# Patient Record
Sex: FEMALE | Race: BLACK OR AFRICAN AMERICAN
[De-identification: names, ages, dates, MRNs, and addresses within clinical notes are randomized per-mention and may not be internally consistent; named-entity substitution may affect disease eponyms.]

---

## 2017-07-10 NOTE — EMERGENCY ROOM REPORT
History of Present Illness


General


Chief Complaint:  Pain


Source:  Patient, Medical Record





Present Illness


HPI


This patient states that she's been in multiple hospitals over the past few 

weeks.  She was admitted to [facility].  She was admitted to 

[facility].  Apparently she has had a labile blood pressure.  She had 

a syncopal episode in a restaurant.  She was found to have a mild cold a this.  

She states that she gets abdominal pain when she eats.  She also states that 

she has chronic right knee pain that is uncontrolled and she needs to get 

physical therapy and rehabilitation for this.  She was sent in by her primary 

care physician Dr. Vogt who plans on admitting her for further evaluation and 

possible placement in a rehabilitation facility.  She denies fever or chills.  

She denies chest pain or shortness of breath.  She has no other complaints.


Allergies:  


Coded Allergies:  


     LAMOTRIGINE (Verified  Allergy, Mild, Hives, 2/2/13)


     PENICILLINS (Verified  Allergy, Mild, Hives, 2/2/13)


     ERYTHROMYCIN BASE (Unverified  Allergy, Unknown, Rash, 2/25/15)


     PREGABALIN (Verified  Allergy, Unknown, 2/25/15)


 SWELLING


     TRAMADOL HCL (Verified  Allergy, Unknown, 2/25/15)


 VOMIT





Patient History


Past Medical History:  see triage record, HTN, CAD, GERD, CVA/TIA


Past Surgical History:  other - Gastric bypass


Social History:  Denies: alcohol use, drug use, smoking


Reviewed Nursing Documentation:  PMH: Agreed, PSxH: Agreed





Nursing Documentation-PMH


Past Medical History:  No History, Except For


Hx Cardiac Problems:  Yes - Acute coronary syndrome


Hx Hypertension:  Yes


Hx Cancer:  No


Hx Gastrointestinal Problems:  Yes - Abdominal pain


Hx Neurological Problems:  Yes - fibromyalgia


Hx Transient Ischemic Attacks:  Yes





Review of Systems


All Other Systems:  negative except mentioned in HPI





Physical Exam





Vital Signs








  Date Time  Temp Pulse Resp B/P Pulse Ox O2 Delivery O2 Flow Rate FiO2


 


7/10/17 18:48 97.9 82 16 117/72 96 Room Air  








Sp02 EP Interpretation:  reviewed, normal


General Appearance:  no apparent distress, alert, GCS 15, non-toxic, obese - 

morbid


Head:  normocephalic, atraumatic


Eyes:  bilateral eye PERRL, bilateral eye normal inspection


ENT:  hearing grossly normal, normal pharynx, no angioedema, normal voice


Neck:  full range of motion, supple/symm/no masses


Respiratory:  chest non-tender, lungs clear, normal breath sounds, speaking 

full sentences


Cardiovascular #1:  regular rate, rhythm, no edema


Gastrointestinal:  normal bowel sounds, non tender, soft, non-distended, no 

guarding, no rebound


Rectal:  deferred


Musculoskeletal:  back normal, normal range of motion, other - Pain w/ ROM of 

R. knee.


Neurologic:  alert, oriented x3, responsive, motor strength/tone normal, 

sensory intact, speech normal


Psychiatric:  judgement/insight normal, memory normal, mood/affect normal, no 

suicidal/homicidal ideation


Skin:  normal color, no rash, warm/dry, well hydrated





Medical Decision Making


Diagnostic Impression:  


 Primary Impression:  


 Failure to thrive in adult


 Additional Impressions:  


 Uncontrolled pain


 Inability to ambulate due to knee


ER Course


This patient presents with failure to thrive.  She has been in and out of 

multiple hospitals over the past few weeks.  She is unable to manage her 

medical conditions at home.  She is admitted for further management and 

possible placement in a rehabilitation facility.





Labs








Test


  7/10/17


20:27


 


White Blood Count


  5.2 K/UL


(4.8-10.8)


 


Red Blood Count


  3.16 M/UL


(4.20-5.40)


 


Hemoglobin


  10.2 G/DL


(12.0-16.0)


 


Hematocrit


  31.2 %


(37.0-47.0)


 


Mean Corpuscular Volume 99 FL (80-99) 


 


Mean Corpuscular Hemoglobin


  32.1 PG


(27.0-31.0)


 


Mean Corpuscular Hemoglobin


Concent 32.6 G/DL


(32.0-36.0)


 


Red Cell Distribution Width


  13.8 %


(11.6-14.8)


 


Platelet Count


  278 K/UL


(150-450)


 


Mean Platelet Volume


  6.3 FL


(6.5-10.1)


 


Neutrophils (%) (Auto)


  49.5 %


(45.0-75.0)


 


Lymphocytes (%) (Auto)


  38.8 %


(20.0-45.0)


 


Monocytes (%) (Auto)


  7.6 %


(1.0-10.0)


 


Eosinophils (%) (Auto)


  2.8 %


(0.0-3.0)


 


Basophils (%) (Auto)


  1.3 %


(0.0-2.0)


 


Sodium Level


  137 mEQ/L


(135-145)


 


Potassium Level


  4.7 mEQ/L


(3.4-4.9)


 


Chloride Level


  102 mEQ/L


()


 


Carbon Dioxide Level


  26 mEQ/L


(20-30)


 


Anion Gap 9 (5-15) 


 


Blood Urea Nitrogen


  23 mg/dL


(7-23)


 


Creatinine


  1.3 mg/dL


(0.5-0.9)


 


Estimat Glomerular Filtration


Rate 42.7 mL/min


(>60)


 


Glucose Level


  79 mg/dL


()


 


Calcium Level


  8.5 mg/dL


(8.6-10.2)


 


Total Bilirubin


  0.2 mg/dL


(0.0-1.2)


 


Aspartate Amino Transf


(AST/SGOT) 12 U/L (5-40) 


 


 


Alanine Aminotransferase


(ALT/SGPT) 10 U/L (3-33) 


 


 


Alkaline Phosphatase


  88 U/L


()


 


Total Protein


  6.8 g/dL


(6.6-8.7)


 


Albumin


  3.7 g/dL


(3.5-5.2)


 


Globulin 3.1 g/dL 


 


Albumin/Globulin Ratio 1.1 (1.0-2.7) 











Last Vital Signs








  Date Time  Temp Pulse Resp B/P Pulse Ox O2 Delivery O2 Flow Rate FiO2


 


7/10/17 18:48 97.9 82 16 117/72 96 Room Air  








Disposition:  ADMITTED AS INPATIENT


Condition:  Stable


Referrals:  


Medhat Vogt MD (PCP)











SARAH JOHNSON D.O. Jul 10, 2017 19:33

## 2017-07-11 NOTE — HISTORY AND PHYSICAL REPORT
DATE OF ADMISSION:  07/11/2017



CHIEF COMPLAINT:  The patient is a 56-year-old, obese 

American female, presents with complaint of right knee pain.



HISTORY OF PRESENT ILLNESS:  The patient has a history of right knee

pain.  The patient is followed by pain management, Dr. Cruz, at [facility].



The patient states she has had intractable knee pain.  Knee pain is

constant.  The patient states knee pain is worse while lying in bed.  The

patient is also unable to walk.



The patient presented to [facility] emergency room after being evaluated at

several other hospitals.  The patient was admitted for intractable knee

pain for placement in physical therapy or acute rehabilitation.



PAST MEDICAL HISTORY:  Significant for,



1. Hypertension.

2. Bipolar depression.

3. Obesity.

4. Fibromyalgia.



PAST SURGICAL HISTORY:  Significant for,



1. Consuelo-en-Y gastric bypass surgery.

2. Total abdominal hysterectomy.

3. Right foot surgery on the fifth toe.



CURRENT MEDICATIONS:

1. Albuterol 2.5 mg nebulizer q.4 h. p.r.n.

2. BuSpar 30 mg one tablet p.o. twice daily.

3. Soma 350 mg one tablet p.o. three times daily.

4. Cymbalta 60 mg one tablet p.o. twice daily.

5. Nexium 40 mg one tablet p.o. daily.

6. Neurontin 1200 mg one tablet p.o. three times daily.

7. Norco 10/325 mg one tablet p.o. q.4 hours p.r.n.

8. Ativan 1 mg one tablet p.o. three times daily.

9. Propranolol 10 mg one tablet p.o. q.6 hours.

10. Seroquel 300 mg one tablet p.o. at bedtime.

11. Seroquel 100 mg one tablet p.o. daily.

12. Trazodone 100 mg one tablet p.o. at bedtime.

13. Trazodone 300 mg one tablet p.o. at bedtime.

14. Diovan 160 mg one tablet p.o. twice daily.

15. Ambien 10 mg one tablet p.o. at bedtime.



ALLERGIES:

1. Lamictal.

2. Penicillin.

3. Erythromycin.

4. Lyrica.

5. Tramadol.

6. Zofran.

7. Neurontin.



SOCIAL HISTORY:  The patient is single and is disabled.  The patient

lives in an apparent board and [facility].  The patient denies tobacco or

alcohol use.



REVIEW OF SYSTEMS:  Constitutional:  The patient denies weight loss

of weight gain.  The patient denies fevers or chills.  HEENT:  The patient

denies ear or throat pain.  Cardiovascular:  The patient denies

palpitations or chest pain.  Chest:  The patient denies wheeze or

shortness of breath.  Abdomen:  The patient denies nausea, vomiting,

diarrhea, or constipation.  Genitourinary:  The patient denies dysuria or

increased frequency of urination.  Neuromuscular:  The patient complains

of knee pain as above.  The patient denies seizures or generalized

weakness.



PHYSICAL EXAMINATION:

VITAL SIGNS:  Temperature 97.5 degrees, respirations 20, pulse 71,

and blood pressure 114/61.

GENERAL:  The patient is well-developed and well-nourished obese

 female, in no apparent distress.

HEENT:  Eyes, pupils are equal and responsive to light and

accommodation.  Extraocular movements are intact.

NECK:  Supple without lymphadenopathy.

CHEST:  Lungs are clear to auscultation bilaterally without wheezes

or rales.

CARDIOVASCULAR:  Regular rhythm and rate.  S1 and S2 are normal

without murmurs, rubs, or gallops.

ABDOMEN:  Soft, obese, nontender, and nondistended.  Positive bowel

sounds.  No evidence of hepatosplenomegaly.  Currently, no rebound or

guarding noted.

EXTREMITIES:  Negative for clubbing, cyanosis, or edema.

RECTAL AND GENITALIA:  Refused.

NEUROLOGIC:  Cranial nerves II through XII are grossly intact without

focal deficits.  Motor strength is 5/5 bilaterally.  Deep tendon reflexes

2+ plantar.



LABORATORY STUDIES:  WBC 5.2, hemoglobin 10.2, hematocrit 31.2, and

platelets 278,000.  Sodium 137, potassium 4.7, chloride 102, CO2 26, BUN

23, and creatinine 1.3.  Glucose 79.  A CT scan of the right knee is

pending.



ASSESSMENT:  This is a 56 year  female.



1. Right knee pain.

2. Osteoarthritis, right knee.

3. Hypertension.

4. Bipolar depression.

5. Obesity.

6. Fibromyalgia.



TREATMENT:

1. Right knee pain.  A CT scan of the right knee is pending.  The

patient is not able to accommodate an MRI due to physical size.  A pain

management consultation is pending.

2. Osteoarthritis, right knee.  The patient has been placed empirically

on Norco 10/325 p.r.n.

3. Hypertension.  Continue valsartan as above.

4. Bipolar depression.  Continue Seroquel and trazodone as above.

Continue Cymbalta as above.

5. Obesity.

6. Fibromyalgia.









  ______________________________________________

  Ludwin Fulton M.D.





DR:  SHELIA

D:  07/11/2017 18:41

T:  07/11/2017 20:17

JOB#:  [id number]

CC:

## 2017-07-11 NOTE — HISTORY & PHYSICAL
History and Physical


History & Physicial


Dictated for Int Med-Dr Cobb no. [id number]











EDER ROBB Jul 11, 2017 18:42

## 2017-07-11 NOTE — CONSULTATION
DATE OF CONSULTATION:  07/11/2017



PAIN MANAGEMENT CONSULTATION



CONSULTING PHYSICIAN:  Behnoush Zarrini, M.D.



PHYSICIAN ASSISTANT:  SHANTEL Briceno



REFERRING PHYSICIAN:  Medhat Vogt M.D.



CHIEF COMPLAINT:  Right knee pain and low back pain.



HISTORY OF PRESENT ILLNESS:  This is a 56-year-old female, who is

being seen on the Medical/Surgical floor of [facility] for

initial comprehensive pain management consultation.  The patient is

reporting that she has been having right knee pain for many years.  It is

a constant, chronic pain, rating at 10/10, describing as a sharp, stabbing

pain, increased with movement and nothing has been helping to relieve her

pain.  Also having low back pain for many years.  It is a constant,

chronic pain, rating at 10/10, radiating down to her right lower

extremity, increasing with movement, and nothing has been helping to

relieve the pain.  The patient reports that she had right knee arthroscopy

with Dr. Fletcher about a year ago but she continued to fall on her knees

increasing her pain.  Also, recently had SI  radiofrequency ablation with

Dr. Edwards at [facility], for her lower back taking Norco 10/325 one

tablet three times a day with minimal pain relief.  Due to this and severe

pain, the patient was admitted to the hospital under the care of Dr. Vogt.



At this time, the patient is on Norco 10/325 mg every eight hours as

needed for moderate pain, morphine 2 mg IV every four hours as needed for

moderate pain, and morphine 4 mg IV every four hours as needed for severe

pain.  We were consulted so that the patient would have adequate pain

control while here in the hospital.



PAST MEDICAL HISTORY:  Hypertension, fibromyalgia, degenerative joint

disease, depression, and anxiety.



PAST SURGICAL HISTORY:  Arthroscopy and hysterectomy.



SOCIAL HISTORY:  Denies smoking tobacco, drinking alcohol, or IV drug

abuse.





REVIEW OF SYSTEMS:  Denies rash, fever, chills, sweating, dizziness,

drowsiness, blurred vision, sore throat, and change in weight.  No

shortness of breath or chest pain.  No nausea, vomiting, diarrhea, or

blood in the stool or urine.  No bowel or bladder incontinence.  No

dysuria.  She is complaining of right knee pain and low back pain.



PHYSICAL EXAMINATION:

GENERAL:  Alert, awake, and oriented x3.

VITAL SIGNS:  Blood pressure 114/61, heart rate is 71, oxygen

saturation 98%, respiratory rate 19, and temperature is 97.2 degrees

Fahrenheit.

HEENT:  PERRLA.

NECK:  Range of motion is full in all directions.  No tenderness to

paracervical muscles.  No adenopathy

LUNGS:  Decreased breath sounds bilaterally

HEART:  S1 and S2 regular.

ABDOMEN:  Obese.

BACK:  Range of motion is decreased in flexion and extension with

tenderness to paraspinal muscles.  No tenderness to trapezius or rhomboid

muscles.

EXTREMITIES:  Upper extremity range of motion is full in all

directions.  Motor is intact.  No cyanosis.  No clubbing.  No edema.

Sensory is intact.  Reflexes are unobtainable.  No adenopathy.  Lower

extremity range of motion is decreased due to the patient's clinical

condition.  Motor is 4/5 in all muscles bilaterally.  No cyanosis.  No

clubbing.  Sensory is reduced.  Reflexes are unobtainable. No

adenopathy.



ASSESSMENT AND PLAN:  This is a 56-year-old female with morbid

obesity, right knee pain and right knee osteoarthritis, lumbar

degenerative disease, lumbar spondylosis, and lumbar radiculopathy.  We

will order an MRI of the right knee without contrast and Lidoderm patch to

be applied to the right knee, 12 hours on and 12 hours off.  Continue on

the morphine and Norco as needed.  The patient was discussed with Dr. Klein and Dr. Klein concurred.  We will follow the patient.



Thank you very much for the courtesy of this consultation.







  ______________________________________________

  Behnoush Zarrini, M.D.



  ______________________________________________

  BRITTANY Briceno





DR:  YVAN

D:  07/11/2017 17:30

T:  07/12/2017 08:57

JOB#:  [id number]

CC:



QAMAR

## 2017-07-11 NOTE — CONSULTATION
History of Present Illness


General


Date patient seen:  Jul 11, 2017


Chief Complaint:  Pain


Referring physician:  dr Fulton


Reason for Consultation:  inpatient management





Present Illness


Allergies:  


Coded Allergies:  


     LAMOTRIGINE (Verified  Allergy, Mild, Hives, 2/2/13)


     PENICILLINS (Verified  Allergy, Mild, Hives, 2/2/13)


     ERYTHROMYCIN BASE (Unverified  Allergy, Unknown, Rash, 2/25/15)


     PREGABALIN (Verified  Allergy, Unknown, 2/25/15)


 SWELLING


     TRAMADOL HCL (Verified  Allergy, Unknown, 2/25/15)


 VOMIT





Medication History


Scheduled


Buspirone Hcl* (Buspar*), 30 MG ORAL BID, (Reported)


Duloxetine Hcl* (Cymbalta*), 60 MG PO BID, (Reported)


Gabapentin* (Neurontin*), 1,200 MG ORAL TID, (Reported)


Lorazepam* (Ativan*), 1 MG ORAL THREE TIMES A DAY, (Reported)


Quetiapine Fumarate (Seroquel), 300 MG ORAL HS, (Reported)


Quetiapine Fumarate* (Seroquel*), 100 MG PO DAILY, (Reported)


Trazodone* (Trazodone*), 100 MG ORAL DAILY, (Reported)


Trazodone* (Trazodone*), 300 MG ORAL BEDTIME, (Reported)


Valsartan (Diovan), 160 MG ORAL BID, (Reported)





Scheduled PRN


Albuterol Sulfate* (Albuterol Sulfate Hhn*), 3 ML INH Q4H PRN for Shortness of 

Breath, (Reported)


Carisoprodol* (Soma*), 350 MG PO TID PRN for For Pain, (Reported)


Esomeprazole Magnesium (Nexium), 40 MG ORAL DAILY PRN for Per rx protocol, (

Reported)


Hydrocodone/Acetaminophen (Hydrocodon-Acetaminophn ), 1 TAB ORAL Q4H PRN 

for For Pain, (Reported)


Propranolol Hcl* (Inderal*), 10 MG ORAL EVERY 6 HOURS PRN for palpitation, (

Reported)


Zolpidem Tartrate* (Ambien*), 10 MG ORAL HS PRN for Insomnia, (Reported)





Patient History


Healthcare decision maker





Resuscitation status





Advanced Directive on File


No





Review of Systems


All Other Systems:  negative except mentioned in HPI





Physical Exam


General Appearance:  WD/WN


Lines, tubes and drains:  peripheral, central line


HEENT:  normocephalic, atraumatic


Neck:  non-tender, normal alignment


Respiratory/Chest:  chest wall non-tender, lungs clear


Cardiovascular/Chest:  normal peripheral pulses, normal rate


Abdomen:  normal bowel sounds, hyperactive bowel sounds


Genitourinary/Rectal:  normal genital exam


Extremities:  normal range of motion, non-tender


Skin Exam:  normal pigmentation





Last 24 Hour Vital Signs








  Date Time  Temp Pulse Resp B/P Pulse Ox O2 Delivery O2 Flow Rate FiO2


 


7/11/17 12:12 97.9 66 18 135/95 96 Room Air  


 


7/11/17 11:02 97.2       


 


7/11/17 08:00 97.2 71 20 114/61 98 Room Air  


 


7/11/17 03:18 97.5       


 


7/11/17 00:28 97.5  17 108/61 99 Room Air  


 


7/11/17 00:18 97.5       


 


7/11/17 00:14 97.5  17 108/61 99 Room Air  


 


7/11/17 00:00  55 18 98/61 99   


 


7/10/17 23:45  64 18 90/46 97 Room Air  


 


7/10/17 21:24  56 16 107/58 96 Room Air  


 


7/10/17 19:30  54 18 114/58 96 Room Air  


 


7/10/17 18:48 97.9 82 16 117/72 96 Room Air  

















Intake and Output  


 


 7/10/17 7/11/17





 18:59 06:59


 


Intake Total  200 ml


 


Output Total  0 ml


 


Balance  200 ml


 


  


 


Intake Oral  200 ml


 


Output Urine Total  0 ml











Laboratory Tests








Test


  7/10/17


20:27 7/11/17


08:30


 


White Blood Count


  5.2 K/UL


(4.8-10.8) 5.5 K/UL


(4.8-10.8)


 


Red Blood Count


  3.16 M/UL


(4.20-5.40)  L 3.09 M/UL


(4.20-5.40)  L


 


Hemoglobin


  10.2 G/DL


(12.0-16.0)  L 9.6 G/DL


(12.0-16.0)  L


 


Hematocrit


  31.2 %


(37.0-47.0)  L 31.1 %


(37.0-47.0)  L


 


Mean Corpuscular Volume


  99 FL (80-99)  


  100 FL (80-99)


H


 


Mean Corpuscular Hemoglobin


  32.1 PG


(27.0-31.0)  H 30.9 PG


(27.0-31.0)


 


Mean Corpuscular Hemoglobin


Concent 32.6 G/DL


(32.0-36.0) 30.8 G/DL


(32.0-36.0)  L


 


Red Cell Distribution Width


  13.8 %


(11.6-14.8) 13.7 %


(11.6-14.8)


 


Platelet Count


  278 K/UL


(150-450) 263 K/UL


(150-450)


 


Mean Platelet Volume


  6.3 FL


(6.5-10.1)  L 6.3 FL


(6.5-10.1)  L


 


Neutrophils (%) (Auto)


  49.5 %


(45.0-75.0) 56.2 %


(45.0-75.0)


 


Lymphocytes (%) (Auto)


  38.8 %


(20.0-45.0) 32.4 %


(20.0-45.0)


 


Monocytes (%) (Auto)


  7.6 %


(1.0-10.0) 8.0 %


(1.0-10.0)


 


Eosinophils (%) (Auto)


  2.8 %


(0.0-3.0) 2.2 %


(0.0-3.0)


 


Basophils (%) (Auto)


  1.3 %


(0.0-2.0) 1.2 %


(0.0-2.0)


 


Sodium Level


  137 mEQ/L


(135-145) 139 mEQ/L


(135-145)


 


Potassium Level


  4.7 mEQ/L


(3.4-4.9) 5.2 mEQ/L


(3.4-4.9)  H


 


Chloride Level


  102 mEQ/L


() 106 mEQ/L


()


 


Carbon Dioxide Level


  26 mEQ/L


(20-30) 23 mEQ/L


(20-30)


 


Anion Gap 9 (5-15)   10 (5-15)  


 


Blood Urea Nitrogen


  23 mg/dL


(7-23) 28 mg/dL


(7-23)  H


 


Creatinine


  1.3 mg/dL


(0.5-0.9)  H 1.4 mg/dL


(0.5-0.9)  H


 


Estimat Glomerular Filtration


Rate 42.7 mL/min


(>60) 39.2 mL/min


(>60)


 


Glucose Level


  79 mg/dL


() 139 mg/dL


()  H


 


Calcium Level


  8.5 mg/dL


(8.6-10.2)  L 8.3 mg/dL


(8.6-10.2)  L


 


Total Bilirubin


  0.2 mg/dL


(0.0-1.2) < 0.2 mg/dL


(0.0-1.2)


 


Aspartate Amino Transf


(AST/SGOT) 12 U/L (5-40)  


  11 U/L (5-40)  


 


 


Alanine Aminotransferase


(ALT/SGPT) 10 U/L (3-33)  


  9 U/L (3-33)  


 


 


Alkaline Phosphatase


  88 U/L


() 82 U/L


()


 


Total Protein


  6.8 g/dL


(6.6-8.7) 6.3 g/dL


(6.6-8.7)  L


 


Albumin


  3.7 g/dL


(3.5-5.2) 3.5 g/dL


(3.5-5.2)


 


Globulin 3.1 g/dL   2.8 g/dL  


 


Albumin/Globulin Ratio 1.1 (1.0-2.7)   1.2 (1.0-2.7)  


 


Thyroid Stimulating Hormone


(TSH) 


  1.870 uIU/mL


(0.300-4.500)








Height (Feet):  5


Height (Inches):  7.00


Weight (Pounds):  306


Medications





Current Medications








 Medications


  (Trade)  Dose


 Ordered  Sig/Tapan


 Route


 PRN Reason  Start Time


 Stop Time Status Last Admin


Dose Admin


 


 Acetaminophen


  (Tylenol)  650 mg  Q4H  PRN


 ORAL


 fever  7/10/17 22:45


 8/9/17 22:44   


 


 


 Acetaminophen/


 Hydrocodone Bitart


  (Norco 10/325)  1 ea  Q8H  PRN


 ORAL


 Moderate Pain (Pain Scale 4-6)  7/11/17 09:30


 7/18/17 09:29   


 


 


 Al Hydroxide/Mg


 Hydroxide


  (Mylanta II)  30 ml  Q6H  PRN


 ORAL


 dyspepsia  7/10/17 22:45


 8/9/17 22:44   


 


 


 Carisoprodol


  (Soma)  350 mg  TID  PRN


 ORAL


 For Pain  7/10/17 22:45


 8/9/17 22:44   


 


 


 Clonazepam


  (KlonoPIN)  1 mg  BID


 ORAL


   7/11/17 18:00


 7/18/17 09:29   


 


 


 Dextrose


  (Dextrose 50%)    STAT  PRN


 IV


 Hypoglycemia  7/10/17 22:45


 8/9/17 22:44   


 


 


 Duloxetine HCl


  (Cymbalta)  60 mg  BID


 ORAL


   7/11/17 09:00


 8/10/17 08:59  7/11/17 09:30


 


 


 Heparin Sodium


  (Porcine)


  (Heparin 5000


 units/ml)  5,000 units  EVERY 12  HOURS


 SUBQ


   7/11/17 09:00


 8/10/17 08:59  7/11/17 09:36


 


 


 Lidocaine


  (Lidoderm 5%


 PATCH)  1 patch  DAILY


 TDERMAL


   7/11/17 09:30


 8/10/17 09:29  7/11/17 09:31


 


 


 Lorazepam


  (Ativan)  1 mg  Q8H  PRN


 ORAL


 For Anxiety  7/11/17 09:30


 7/18/17 09:29  7/11/17 15:01


 


 


 Morphine Sulfate


  (Morphine


 Sulfate)  2 mg  Q4H  PRN


 IVP


 For Pain 4-6 7/11/17 09:04


 7/17/17 22:44   


 


 


 Morphine Sulfate


  (Morphine


 Sulfate)  4 mg  Q4H  PRN


 IVP


 For Pain 7-10  7/10/17 22:45


 7/17/17 22:44  7/11/17 14:49


 


 


 Ondansetron HCl


  (Zofran)  4 mg  Q6H  PRN


 IVP


 Nausea & Vomiting  7/10/17 22:45


 8/9/17 22:44  7/11/17 15:01


 


 


 Patient Own


 Medication


  (Patient's Own


 Med)  1 ea  BID


 ORAL


   7/11/17 18:00


 8/10/17 17:59  7/11/17 11:50


 


 


 Polyethylene


 Glycol


  (Miralax)  17 gm  HSPRN  PRN


 ORAL


 Constipation  7/10/17 22:45


 8/9/17 22:44   


 


 


 Propranolol HCl


  (Inderal)  10 mg  EVERY 6 HOURS  PRN


 ORAL


 palpitation  7/10/17 22:45


 8/9/17 22:44   


 


 


 Quetiapine


 Fumarate


  (SEROquel)  100 mg  DAILY


 ORAL


   7/11/17 09:00


 8/10/17 08:59  7/11/17 09:30


 


 


 Trazodone HCl


  (Desyrel)  100 mg  DAILY


 ORAL


   7/11/17 09:00


 8/10/17 08:59  7/11/17 09:30


 


 


 Zolpidem Tartrate


  (Ambien)  5 mg  HSPRN  PRN


 ORAL


 Insomnia  7/10/17 22:45


 8/9/17 22:44   


 











Assessment/Plan


Problem List:  


(1) Uncontrolled pain


ICD Codes:  R52 - Pain, unspecified


SNOMED:  24065464208220184


(2) Inability to ambulate due to knee


ICD Codes:  R26.2 - Difficulty in walking, not elsewhere classified


SNOMED:  910430478


(3) Failure to thrive in adult


ICD Codes:  R62.7 - Adult failure to thrive


SNOMED:  912006850


(4) Weakness


ICD Codes:  R53.1 - Weakness


SNOMED:  43242847


Assessment/Plan


pain management


pt/ot


dvt prophylaxis


social service consult











KYRIE PIÑA Jul 11, 2017 15:23

## 2017-07-11 NOTE — DIAGNOSTIC IMAGING REPORT
Indication:  Knee pain



Technique: Continuous helical imaging of the right knee was performed in the

transaxial plane. Coronal 2-D reformatted images were also generated. Study obtained

in a [employer] Sensation 64 slice CT.



total  mGycm



CTD/vol 15.3, 0.15x3 mGy



Comparison:  None



Findings:



There is no evidence of an acute fracture or significant malalignment identified on

this examination. There is a narrowing and mild osteophyte formation involving 

the

patellofemoral compartment at its lateral aspect involving the lateral patellar

facet. There is slight lateral subluxation as well as the patella. Small popliteal

cyst is noted. There are marginal spurs also noted in the other 2 compartments.

There is a small joint effusion identified.  Soft tissues are unremarkable.



Impression:



Osteoarthrosis as described above. Small joint effusion.



The CT scanner at [facility] is accredited by the [employer] 

of

Radiology and the scans are performed using dose optimization techniques as

appropriate to a performed exam including Automatic Exposure control.

## 2017-07-11 NOTE — CONSULTATION
History of Present Illness


General


Date patient seen:  Jul 11, 2017





Present Illness


Allergies:  


Coded Allergies:  


     LAMOTRIGINE (Verified  Allergy, Mild, Hives, 2/2/13)


     PENICILLINS (Verified  Allergy, Mild, Hives, 2/2/13)


     ERYTHROMYCIN BASE (Unverified  Allergy, Unknown, Rash, 2/25/15)


     PREGABALIN (Verified  Allergy, Unknown, 2/25/15)


 SWELLING


     TRAMADOL HCL (Verified  Allergy, Unknown, 2/25/15)


 VOMIT





Medication History


Scheduled


Buspirone Hcl* (Buspar*), 30 MG ORAL BID, (Reported)


Duloxetine Hcl* (Cymbalta*), 60 MG PO BID, (Reported)


Gabapentin* (Neurontin*), 1,200 MG ORAL TID, (Reported)


Lorazepam* (Ativan*), 1 MG ORAL THREE TIMES A DAY, (Reported)


Quetiapine Fumarate (Seroquel), 300 MG ORAL HS, (Reported)


Quetiapine Fumarate* (Seroquel*), 100 MG PO DAILY, (Reported)


Trazodone* (Trazodone*), 100 MG ORAL DAILY, (Reported)


Trazodone* (Trazodone*), 300 MG ORAL BEDTIME, (Reported)


Valsartan (Diovan), 160 MG ORAL BID, (Reported)





Scheduled PRN


Albuterol Sulfate* (Albuterol Sulfate Hhn*), 3 ML INH Q4H PRN for Shortness of 

Breath, (Reported)


Carisoprodol* (Soma*), 350 MG PO TID PRN for For Pain, (Reported)


Esomeprazole Magnesium (Nexium), 40 MG ORAL DAILY PRN for Per rx protocol, (

Reported)


Hydrocodone/Acetaminophen (Hydrocodon-Acetaminophn ), 1 TAB ORAL Q4H PRN 

for For Pain, (Reported)


Propranolol Hcl* (Inderal*), 10 MG ORAL EVERY 6 HOURS PRN for palpitation, (

Reported)


Zolpidem Tartrate* (Ambien*), 10 MG ORAL HS PRN for Insomnia, (Reported)





Patient History


Healthcare decision maker





Resuscitation status





Advanced Directive on File


No





Physical Exam





Last 24 Hour Vital Signs








  Date Time  Temp Pulse Resp B/P Pulse Ox O2 Delivery O2 Flow Rate FiO2


 


7/11/17 08:00 97.2 71 20 114/61 98 Room Air  


 


7/11/17 03:18 97.5       


 


7/11/17 00:28 97.5  17 108/61 99 Room Air  


 


7/11/17 00:18 97.5       


 


7/11/17 00:14 97.5  17 108/61 99 Room Air  


 


7/11/17 00:00  55 18 98/61 99   


 


7/10/17 23:45  64 18 90/46 97 Room Air  


 


7/10/17 21:24  56 16 107/58 96 Room Air  


 


7/10/17 19:30  54 18 114/58 96 Room Air  


 


7/10/17 18:48 97.9 82 16 117/72 96 Room Air  

















Intake and Output  


 


 7/10/17 7/11/17





 19:00 07:00


 


Intake Total  200 ml


 


Output Total  0 ml


 


Balance  200 ml


 


  


 


Intake Oral  200 ml


 


Output Urine Total  0 ml











Laboratory Tests








Test


  7/10/17


20:27


 


White Blood Count


  5.2 K/UL


(4.8-10.8)


 


Red Blood Count


  3.16 M/UL


(4.20-5.40)  L


 


Hemoglobin


  10.2 G/DL


(12.0-16.0)  L


 


Hematocrit


  31.2 %


(37.0-47.0)  L


 


Mean Corpuscular Volume 99 FL (80-99)  


 


Mean Corpuscular Hemoglobin


  32.1 PG


(27.0-31.0)  H


 


Mean Corpuscular Hemoglobin


Concent 32.6 G/DL


(32.0-36.0)


 


Red Cell Distribution Width


  13.8 %


(11.6-14.8)


 


Platelet Count


  278 K/UL


(150-450)


 


Mean Platelet Volume


  6.3 FL


(6.5-10.1)  L


 


Neutrophils (%) (Auto)


  49.5 %


(45.0-75.0)


 


Lymphocytes (%) (Auto)


  38.8 %


(20.0-45.0)


 


Monocytes (%) (Auto)


  7.6 %


(1.0-10.0)


 


Eosinophils (%) (Auto)


  2.8 %


(0.0-3.0)


 


Basophils (%) (Auto)


  1.3 %


(0.0-2.0)


 


Sodium Level


  137 mEQ/L


(135-145)


 


Potassium Level


  4.7 mEQ/L


(3.4-4.9)


 


Chloride Level


  102 mEQ/L


()


 


Carbon Dioxide Level


  26 mEQ/L


(20-30)


 


Anion Gap 9 (5-15)  


 


Blood Urea Nitrogen


  23 mg/dL


(7-23)


 


Creatinine


  1.3 mg/dL


(0.5-0.9)  H


 


Estimat Glomerular Filtration


Rate 42.7 mL/min


(>60)


 


Glucose Level


  79 mg/dL


()


 


Calcium Level


  8.5 mg/dL


(8.6-10.2)  L


 


Total Bilirubin


  0.2 mg/dL


(0.0-1.2)


 


Aspartate Amino Transf


(AST/SGOT) 12 U/L (5-40)  


 


 


Alanine Aminotransferase


(ALT/SGPT) 10 U/L (3-33)  


 


 


Alkaline Phosphatase


  88 U/L


()


 


Total Protein


  6.8 g/dL


(6.6-8.7)


 


Albumin


  3.7 g/dL


(3.5-5.2)


 


Globulin 3.1 g/dL  


 


Albumin/Globulin Ratio 1.1 (1.0-2.7)  








Height (Feet):  5


Height (Inches):  7.00


Weight (Pounds):  306


Medications





Current Medications








 Medications


  (Trade)  Dose


 Ordered  Sig/Tapan


 Route


 PRN Reason  Start Time


 Stop Time Status Last Admin


Dose Admin


 


 Acetaminophen


  (Tylenol)  650 mg  Q4H  PRN


 ORAL


 fever  7/10/17 22:45


 8/9/17 22:44   


 


 


 Al Hydroxide/Mg


 Hydroxide


  (Mylanta II)  30 ml  Q6H  PRN


 ORAL


 dyspepsia  7/10/17 22:45


 8/9/17 22:44   


 


 


 Buspirone HCl


  (Buspar)  30 mg  BID


 ORAL


   7/11/17 09:00


 8/10/17 08:59   


 


 


 Carisoprodol


  (Soma)  350 mg  TID  PRN


 ORAL


 For Pain  7/10/17 22:45


 8/9/17 22:44   


 


 


 Dextrose


  (Dextrose 50%)    STAT  PRN


 IV


 Hypoglycemia  7/10/17 22:45


 8/9/17 22:44   


 


 


 Duloxetine HCl


  (Cymbalta)  60 mg  BID


 ORAL


   7/11/17 09:00


 8/10/17 08:59   


 


 


 Heparin Sodium


  (Porcine)


  (Heparin 5000


 units/ml)  5,000 units  EVERY 12  HOURS


 SUBQ


   7/11/17 09:00


 8/10/17 08:59   


 


 


 Lorazepam


  (Ativan 2mg/ml


 1ml)  0.5 mg  Q4H  PRN


 IV


 For Anxiety  7/10/17 22:45


 7/17/17 22:44   


 


 


 Morphine Sulfate


  (Morphine


 Sulfate)  2 mg  Q4H  PRN


 IVP


 For Pain 4-6  7/10/17 22:45


 7/17/17 22:44  7/11/17 06:46


 


 


 Morphine Sulfate


  (Morphine


 Sulfate)  4 mg  Q4H  PRN


 IVP


 For Pain 7-10  7/10/17 22:45


 7/17/17 22:44   


 


 


 Ondansetron HCl


  (Zofran)  4 mg  Q6H  PRN


 IVP


 Nausea & Vomiting  7/10/17 22:45


 8/9/17 22:44   


 


 


 Polyethylene


 Glycol


  (Miralax)  17 gm  HSPRN  PRN


 ORAL


 Constipation  7/10/17 22:45


 8/9/17 22:44   


 


 


 Propranolol HCl


  (Inderal)  10 mg  EVERY 6 HOURS  PRN


 ORAL


 palpitation  7/10/17 22:45


 8/9/17 22:44   


 


 


 Quetiapine


 Fumarate


  (SEROquel)  100 mg  DAILY


 ORAL


   7/11/17 09:00


 8/10/17 08:59   


 


 


 Trazodone HCl


  (Desyrel)  100 mg  DAILY


 ORAL


   7/11/17 09:00


 8/10/17 08:59   


 


 


 Zolpidem Tartrate


  (Ambien)  5 mg  HSPRN  PRN


 ORAL


 Insomnia  7/10/17 22:45


 8/9/17 22:44   


 











Assessment/Plan


Assessment/Plan


(1) Morbid Obesity


(2) Right knee pain


(3) Right knee Osteoarthritis


(4) Lumbar DDD


(5) Lumbar Spondylosis


(6) Lumbar Radiculopathy


seen dictated











SUSY AGUILERA Jul 11, 2017 08:47

## 2017-07-12 NOTE — PULMONOLOGY PROGRESS NOTE
Assessment/Plan


Problems:  


(1) Uncontrolled pain


(2) Inability to ambulate due to knee


(3) Failure to thrive in adult


(4) Weakness





Subjective


Allergies:  


Coded Allergies:  


     LAMOTRIGINE (Verified  Allergy, Mild, Hives, 2/2/13)


     PENICILLINS (Verified  Allergy, Mild, Hives, 2/2/13)


     ERYTHROMYCIN BASE (Unverified  Allergy, Unknown, Rash, 2/25/15)


     PREGABALIN (Verified  Allergy, Unknown, 2/25/15)


 SWELLING


     TRAMADOL HCL (Verified  Allergy, Unknown, 2/25/15)


 VOMIT





Objective





Last 24 Hour Vital Signs








  Date Time  Temp Pulse Resp B/P Pulse Ox O2 Delivery O2 Flow Rate FiO2


 


7/12/17 16:00 97.9 50 18 112/60 96 Room Air  


 


7/12/17 13:32 97.7 71 18 117/60 94 Room Air  


 


7/12/17 09:41    105/64    


 


7/12/17 08:55 97.2 58 18 77/39  Room Air  


 


7/12/17 05:06 97.5       


 


7/12/17 04:47 97.5 61 18 105/57 98 Room Air  


 


7/12/17 04:20 97.5 50 17 98/43 98 Room Air  


 


7/11/17 23:46 96.4 53 18 91/55 94 Room Air  


 


7/11/17 20:24 96.1       


 


7/11/17 20:08 96.1 52 20 127/74 98 Room Air  

















Intake and Output  


 


 7/11/17 7/12/17





 19:00 07:00


 


Intake Total 480 ml 


 


Balance 480 ml 


 


  


 


Intake Oral 480 ml 


 


# Voids 2 








Laboratory Tests


7/12/17 05:35: 


White Blood Count 4.6L, Red Blood Count 2.97L, Hemoglobin 9.2L, Hematocrit 29.8L

, Mean Corpuscular Volume 101H, Mean Corpuscular Hemoglobin 30.9, Mean 

Corpuscular Hemoglobin Concent 30.8L, Red Cell Distribution Width 13.7, 

Platelet Count 252, Mean Platelet Volume 6.2L, Neutrophils (%) (Auto) 46.9, 

Lymphocytes (%) (Auto) 42.1, Monocytes (%) (Auto) 8.0, Eosinophils (%) (Auto) 

2.5, Basophils (%) (Auto) 0.5, Sodium Level 137, Potassium Level 4.7, Chloride 

Level 105, Carbon Dioxide Level 26, Anion Gap 6, Blood Urea Nitrogen 25H, 

Creatinine 1.2H, Estimat Glomerular Filtration Rate 46.8, Glucose Level 92, 

Calcium Level 8.3L





Current Medications








 Medications


  (Trade)  Dose


 Ordered  Sig/Tapan


 Route


 PRN Reason  Start Time


 Stop Time Status Last Admin


Dose Admin


 


 Acetaminophen


  (Tylenol)  650 mg  Q4H  PRN


 ORAL


 fever  7/10/17 22:45


 8/9/17 22:44   


 


 


 Acetaminophen/


 Hydrocodone Bitart


  (Norco 10/325)  1 ea  Q8H  PRN


 ORAL


 Moderate Pain (Pain Scale 4-6)  7/11/17 09:30


 7/18/17 09:29   


 


 


 Al Hydroxide/Mg


 Hydroxide


  (Mylanta II)  30 ml  Q6H  PRN


 ORAL


 dyspepsia  7/10/17 22:45


 8/9/17 22:44   


 


 


 Carisoprodol


  (Soma)  350 mg  TID  PRN


 ORAL


 For Pain  7/10/17 22:45


 8/9/17 22:44   


 


 


 Clonazepam


  (KlonoPIN)  1 mg  BID


 ORAL


   7/11/17 18:00


 7/18/17 09:29  7/12/17 18:05


 


 


 Dextrose


  (Dextrose 50%)    STAT  PRN


 IV


 Hypoglycemia  7/10/17 22:45


 8/9/17 22:44   


 


 


 Duloxetine HCl


  (Cymbalta)  60 mg  DAILY


 ORAL


   7/12/17 09:00


 8/11/17 08:59  7/12/17 09:48


 


 


 Heparin Sodium


  (Porcine)


  (Heparin 5000


 units/ml)  5,000 units  EVERY 12  HOURS


 SUBQ


   7/11/17 09:00


 8/10/17 08:59  7/12/17 09:51


 


 


 Lidocaine


  (Lidoderm 5%


 PATCH)  1 patch  DAILY


 TDERMAL


   7/11/17 09:30


 8/10/17 09:29  7/12/17 09:47


 


 


 Lorazepam


  (Ativan)  1 mg  Q8H  PRN


 ORAL


 For Anxiety  7/11/17 09:30


 7/18/17 09:29  7/12/17 06:49


 


 


 Morphine Sulfate


  (Morphine


 Sulfate)  2 mg  Q4H  PRN


 IVP


 For Pain 4-6  7/11/17 09:04


 7/17/17 22:44  7/12/17 04:36


 


 


 Morphine Sulfate


  (Morphine


 Sulfate)  4 mg  Q4H  PRN


 IVP


 For Pain 7-10  7/10/17 22:45


 7/17/17 22:44  7/12/17 18:06


 


 


 Ondansetron HCl


  (Zofran)  4 mg  Q6H  PRN


 IVP


 Nausea & Vomiting  7/10/17 22:45


 8/9/17 22:44  7/12/17 13:59


 


 


 Patient Own


 Medication


  (Patient's Own


 Med)  1 ea  BID


 ORAL


   7/11/17 18:00


 8/10/17 17:59  7/12/17 18:06


 


 


 Polyethylene


 Glycol


  (Miralax)  17 gm  HSPRN  PRN


 ORAL


 Constipation  7/10/17 22:45


 8/9/17 22:44   


 


 


 Propranolol HCl


  (Inderal)  10 mg  EVERY 6 HOURS  PRN


 ORAL


 palpitation  7/10/17 22:45


 8/9/17 22:44   


 


 


 Quetiapine


 Fumarate


  (SEROquel)  100 mg  DAILY


 ORAL


   7/11/17 09:00


 8/10/17 08:59  7/12/17 09:47


 


 


 Quetiapine


 Fumarate


  (SEROquel)  300 mg  BEDTIME


 ORAL


   7/11/17 22:00


 8/10/17 21:59  7/11/17 21:43


 


 


 Solifenacin


  (Vesicare)  10 mg  DAILY


 ORAL


   7/12/17 16:00


 8/11/17 15:59  7/12/17 16:08


 


 


 Trazodone HCl


  (Desyrel)  100 mg  DAILY


 ORAL


   7/11/17 09:00


 8/10/17 08:59  7/12/17 09:47


 


 


 Trazodone HCl


  (Desyrel)  300 mg  BEDTIME


 ORAL


   7/11/17 22:00


 8/10/17 21:59  7/11/17 21:42


 


 


 Zolpidem Tartrate


  (Ambien)  5 mg  HSPRN  PRN


 ORAL


 Insomnia  7/10/17 22:45


 8/9/17 22:44  7/11/17 20:49


 

















KYRIE PIÑA Jul 12, 2017 18:57

## 2017-07-12 NOTE — GENERAL PROGRESS NOTE
Assessment/Plan


Assessment/Plan


(1) Morbid Obesity


(2) Right knee pain


(3) Right knee Osteoarthritis


(4) Lumbar DDD


(5) Lumbar Spondylosis


(6) Lumbar Radiculopathy


Patient will be continued on  Norco and Morphine


We recommend patient to be seen by Orthopedic surgeon. 


D/w Dr. Klein and he concurred.





Subjective


Date patient seen:  Jul 12, 2017


Time patient seen:  06:30 - am


Allergies:  


Coded Allergies:  


     LAMOTRIGINE (Verified  Allergy, Mild, Hives, 2/2/13)


     PENICILLINS (Verified  Allergy, Mild, Hives, 2/2/13)


     ERYTHROMYCIN BASE (Unverified  Allergy, Unknown, Rash, 2/25/15)


     PREGABALIN (Verified  Allergy, Unknown, 2/25/15)


 SWELLING


     TRAMADOL HCL (Verified  Allergy, Unknown, 2/25/15)


 VOMIT


Subjective


REVIEW OF SYSTEMS:  Denies rash, fever, chills, sweating, dizziness,


drowsiness, blurred vision, sore throat, and change in weight.  No


shortness of breath or chest pain.  No nausea, vomiting, diarrhea, or


blood in the stool or urine.  No bowel or bladder incontinence.  No


dysuria.  She is complaining of right knee pain and low back pain.





SUBJECTIVE: Patient continues to c/o pain which has reduced to a 7/10.


She was unable to have MRI due to weight and a CT was performed and


reviewed with the patient we recommend pt to be seen by ORTHO.





Objective





Last 24 Hour Vital Signs








  Date Time  Temp Pulse Resp B/P Pulse Ox O2 Delivery O2 Flow Rate FiO2


 


7/12/17 08:55 97.2 58 18 77/39  Room Air  


 


7/12/17 05:06 97.5       


 


7/12/17 04:47 97.5 61 18 105/57 98 Room Air  


 


7/12/17 04:20 97.5 50 17 98/43 98 Room Air  


 


7/11/17 23:46 96.4 53 18 91/55 94 Room Air  


 


7/11/17 20:24 96.1       


 


7/11/17 20:08 96.1 52 20 127/74 98 Room Air  


 


7/11/17 16:05 97.0 53 18 125/79 97 Room Air  


 


7/11/17 12:12 97.9 66 18 135/95 96 Room Air  

















Intake and Output  


 


 7/11/17 7/12/17





 19:00 07:00


 


Intake Total 480 ml 


 


Balance 480 ml 


 


  


 


Intake Oral 480 ml 


 


# Voids 2 








Laboratory Tests


7/12/17 05:35: 


White Blood Count 4.6L, Red Blood Count 2.97L, Hemoglobin 9.2L, Hematocrit 29.8L

, Mean Corpuscular Volume 101H, Mean Corpuscular Hemoglobin 30.9, Mean 

Corpuscular Hemoglobin Concent 30.8L, Red Cell Distribution Width 13.7, 

Platelet Count 252, Mean Platelet Volume 6.2L, Neutrophils (%) (Auto) 46.9, 

Lymphocytes (%) (Auto) 42.1, Monocytes (%) (Auto) 8.0, Eosinophils (%) (Auto) 

2.5, Basophils (%) (Auto) 0.5, Sodium Level 137, Potassium Level 4.7, Chloride 

Level 105, Carbon Dioxide Level 26, Anion Gap 6, Blood Urea Nitrogen 25H, 

Creatinine 1.2H, Estimat Glomerular Filtration Rate 46.8, Glucose Level 92, 

Calcium Level 8.3L


Height (Feet):  5


Height (Inches):  7.00


Weight (Pounds):  306


Objective


GENERAL:  Alert, awake, and oriented x3.


HEENT:  PERRLA.


NECK:  Range of motion is full in all directions.  No tenderness to


paracervical muscles.  No adenopathy


LUNGS:  Decreased breath sounds bilaterally


HEART:  S1 and S2 regular.


ABDOMEN:  Obese.


BACK:  tenderness to paraspinal muscles.  


EXTREMITIES:  No cyanosis.  No clubbing.


NEURO: No Changes. 





Procedure: CT Knee R 


Impression:


Osteoarthrosis as described above. Small joint effusion.











SUSY AGUILERA Jul 12, 2017 09:03

## 2017-07-12 NOTE — INTERNAL MED PROGRESS NOTE
Subjective


Date of Service:  Jul 12, 2017


Physician Name


Eder Robb


Attending Physician


Medhat Vogt MD





Current Medications








 Medications


  (Trade)  Dose


 Ordered  Sig/Tapan


 Route


 PRN Reason  Start Time


 Stop Time Status Last Admin


Dose Admin


 


 Acetaminophen


  (Tylenol)  650 mg  Q4H  PRN


 ORAL


 fever  7/10/17 22:45


 8/9/17 22:44   


 


 


 Acetaminophen/


 Hydrocodone Bitart


  (Norco 10/325)  1 ea  Q8H  PRN


 ORAL


 Moderate Pain (Pain Scale 4-6)  7/11/17 09:30


 7/18/17 09:29   


 


 


 Al Hydroxide/Mg


 Hydroxide


  (Mylanta II)  30 ml  Q6H  PRN


 ORAL


 dyspepsia  7/10/17 22:45


 8/9/17 22:44   


 


 


 Carisoprodol


  (Soma)  350 mg  TID  PRN


 ORAL


 For Pain  7/10/17 22:45


 8/9/17 22:44   


 


 


 Clonazepam


  (KlonoPIN)  1 mg  BID


 ORAL


   7/11/17 18:00


 7/18/17 09:29  7/12/17 09:47


 


 


 Dextrose


  (Dextrose 50%)    STAT  PRN


 IV


 Hypoglycemia  7/10/17 22:45


 8/9/17 22:44   


 


 


 Duloxetine HCl


  (Cymbalta)  60 mg  DAILY


 ORAL


   7/12/17 09:00


 8/11/17 08:59  7/12/17 09:48


 


 


 Heparin Sodium


  (Porcine)


  (Heparin 5000


 units/ml)  5,000 units  EVERY 12  HOURS


 SUBQ


   7/11/17 09:00


 8/10/17 08:59  7/12/17 09:51


 


 


 Lidocaine


  (Lidoderm 5%


 PATCH)  1 patch  DAILY


 TDERMAL


   7/11/17 09:30


 8/10/17 09:29  7/12/17 09:47


 


 


 Lorazepam


  (Ativan)  1 mg  Q8H  PRN


 ORAL


 For Anxiety  7/11/17 09:30


 7/18/17 09:29  7/12/17 06:49


 


 


 Morphine Sulfate


  (Morphine


 Sulfate)  2 mg  Q4H  PRN


 IVP


 For Pain 4-6  7/11/17 09:04


 7/17/17 22:44  7/12/17 04:36


 


 


 Morphine Sulfate


  (Morphine


 Sulfate)  4 mg  Q4H  PRN


 IVP


 For Pain 7-10  7/10/17 22:45


 7/17/17 22:44  7/12/17 09:54


 


 


 Ondansetron HCl


  (Zofran)  4 mg  Q6H  PRN


 IVP


 Nausea & Vomiting  7/10/17 22:45


 8/9/17 22:44  7/11/17 15:01


 


 


 Patient Own


 Medication


  (Patient's Own


 Med)  1 ea  BID


 ORAL


   7/11/17 18:00


 8/10/17 17:59  7/12/17 09:48


 


 


 Polyethylene


 Glycol


  (Miralax)  17 gm  HSPRN  PRN


 ORAL


 Constipation  7/10/17 22:45


 8/9/17 22:44   


 


 


 Propranolol HCl


  (Inderal)  10 mg  EVERY 6 HOURS  PRN


 ORAL


 palpitation  7/10/17 22:45


 8/9/17 22:44   


 


 


 Quetiapine


 Fumarate


  (SEROquel)  100 mg  DAILY


 ORAL


   7/11/17 09:00


 8/10/17 08:59  7/12/17 09:47


 


 


 Quetiapine


 Fumarate


  (SEROquel)  300 mg  BEDTIME


 ORAL


   7/11/17 22:00


 8/10/17 21:59  7/11/17 21:43


 


 


 Solifenacin


  (Vesicare)  10 mg  DAILY


 ORAL


   7/13/17 09:00


 8/12/17 08:59 UNV  


 


 


 Trazodone HCl


  (Desyrel)  100 mg  DAILY


 ORAL


   7/11/17 09:00


 8/10/17 08:59  7/12/17 09:47


 


 


 Trazodone HCl


  (Desyrel)  300 mg  BEDTIME


 ORAL


   7/11/17 22:00


 8/10/17 21:59  7/11/17 21:42


 


 


 Zolpidem Tartrate


  (Ambien)  5 mg  HSPRN  PRN


 ORAL


 Insomnia  7/10/17 22:45


 8/9/17 22:44  7/11/17 20:49


 








Allergies:  


Coded Allergies:  


     LAMOTRIGINE (Verified  Allergy, Mild, Hives, 2/2/13)


     PENICILLINS (Verified  Allergy, Mild, Hives, 2/2/13)


     ERYTHROMYCIN BASE (Unverified  Allergy, Unknown, Rash, 2/25/15)


     PREGABALIN (Verified  Allergy, Unknown, 2/25/15)


 SWELLING


     TRAMADOL HCL (Verified  Allergy, Unknown, 2/25/15)


 VOMIT


ROS Limited/Unobtainable:  No


Constitutional:  Reports: no symptoms


HEENT:  Reports: no symptoms


Cardiovascular:  Reports: no symptoms


Respiratory:  Reports: no symptoms


Gastrointestinal/Abdominal:  Reports: no symptoms


Genitourinary:  Reports: no symptoms


Neurologic/Psychiatric:  Reports: no symptoms


Subjective


55 YO F admitted with right knee pain.  Await acceptance to skilled nursing 

facility.  Cover for [facility] Sergio-Dr Vogt.





Objective





Last Vital Signs








  Date Time  Temp Pulse Resp B/P Pulse Ox O2 Delivery O2 Flow Rate FiO2


 


7/12/17 13:32 97.7 71 18 117/60 94 Room Air  








General Appearance:  WD/WN, no apparent distress, obese


EENT:  PERRL/EOMI, normal ENT inspection


Neck:  non-tender, normal alignment, supple


Cardiovascular:  normal peripheral pulses, normal rate, regular rhythm, no 

gallop/murmur, no JVD


Respiratory/Chest:  chest wall non-tender, lungs clear, normal breath sounds, 

no respiratory distress, no accessory muscle use


Abdomen:  normal bowel sounds, non tender, soft, no organomegaly, no mass, 

abnormal bowel sounds


Extremities:  normal range of motion


Edema:  trace edema


Neurologic:  CNs II-XII grossly normal, no motor/sensory deficits


Skin:  normal pigmentation, warm/dry





Laboratory Tests








Test


  7/12/17


05:35


 


White Blood Count


  4.6 K/UL


(4.8-10.8)  L


 


Red Blood Count


  2.97 M/UL


(4.20-5.40)  L


 


Hemoglobin


  9.2 G/DL


(12.0-16.0)  L


 


Hematocrit


  29.8 %


(37.0-47.0)  L


 


Mean Corpuscular Volume


  101 FL (80-99)


H


 


Mean Corpuscular Hemoglobin


  30.9 PG


(27.0-31.0)


 


Mean Corpuscular Hemoglobin


Concent 30.8 G/DL


(32.0-36.0)  L


 


Red Cell Distribution Width


  13.7 %


(11.6-14.8)


 


Platelet Count


  252 K/UL


(150-450)


 


Mean Platelet Volume


  6.2 FL


(6.5-10.1)  L


 


Neutrophils (%) (Auto)


  46.9 %


(45.0-75.0)


 


Lymphocytes (%) (Auto)


  42.1 %


(20.0-45.0)


 


Monocytes (%) (Auto)


  8.0 %


(1.0-10.0)


 


Eosinophils (%) (Auto)


  2.5 %


(0.0-3.0)


 


Basophils (%) (Auto)


  0.5 %


(0.0-2.0)


 


Sodium Level


  137 mEQ/L


(135-145)


 


Potassium Level


  4.7 mEQ/L


(3.4-4.9)


 


Chloride Level


  105 mEQ/L


()


 


Carbon Dioxide Level


  26 mEQ/L


(20-30)


 


Anion Gap 6 (5-15)  


 


Blood Urea Nitrogen


  25 mg/dL


(7-23)  H


 


Creatinine


  1.2 mg/dL


(0.5-0.9)  H


 


Estimat Glomerular Filtration


Rate 46.8 mL/min


(>60)


 


Glucose Level


  92 mg/dL


()


 


Calcium Level


  8.3 mg/dL


(8.6-10.2)  L

















Intake and Output  


 


 7/11/17 7/12/17





 19:00 07:00


 


Intake Total 480 ml 


 


Balance 480 ml 


 


  


 


Intake Oral 480 ml 


 


# Voids 2 











Assessment/Plan


Problem List:  


(1) Right knee pain


Assessment & Plan:  Continue physical therapy





(2) Osteomyelitis


(3) Hypertension


Assessment & Plan:  Continue propranolol





(4) Bipolar 1 disorder, depressed


Assessment & Plan:  Continue seroquel





(5) Obesity (BMI 35.0-39.9 without comorbidity)


(6) Fibromyalgia


Assessment/Plan


Transfer to skilled nursing facility when bed available.











EDER ROBB Jul 12, 2017 14:04

## 2017-07-13 NOTE — GENERAL PROGRESS NOTE
Assessment/Plan


Assessment/Plan


(1) Morbid Obesity


(2) Right knee pain


(3) Right knee Osteoarthritis


(4) Lumbar DDD


(5) Lumbar Spondylosis


(6) Lumbar Radiculopathy


Patient will be continued on  Norco and Morphine


D/w Dr. Klein and he concurred.





Subjective


Date patient seen:  Jul 13, 2017


Time patient seen:  06:30 - am


Allergies:  


Coded Allergies:  


     LAMOTRIGINE (Verified  Allergy, Mild, Hives, 2/2/13)


     PENICILLINS (Verified  Allergy, Mild, Hives, 2/2/13)


     ERYTHROMYCIN BASE (Unverified  Allergy, Unknown, Rash, 2/25/15)


     PREGABALIN (Verified  Allergy, Unknown, 2/25/15)


 SWELLING


     TRAMADOL HCL (Verified  Allergy, Unknown, 2/25/15)


 VOMIT


Subjective


REVIEW OF SYSTEMS:  Denies rash, fever, chills, sweating, dizziness,


drowsiness, blurred vision, sore throat, and change in weight.  No


shortness of breath or chest pain.  No nausea, vomiting, diarrhea, or


blood in the stool or urine.  No bowel or bladder incontinence.  No


dysuria.  She is complaining of right knee pain and low back pain.





SUBJECTIVE: Pain has been unchanged and is tolerated on the medications. 


She is waiting to be seen by orthopedic surgeon.





Objective





Last 24 Hour Vital Signs








  Date Time  Temp Pulse Resp B/P Pulse Ox O2 Delivery O2 Flow Rate FiO2


 


7/13/17 07:43  81 20 132/56 96 Room Air  


 


7/13/17 07:38 97.6       


 


7/13/17 04:36 97.6 54 21 97/60 96 Room Air  


 


7/13/17 04:14 97.0       


 


7/13/17 04:00 97.7 61 18 97/60 96 Room Air  


 


7/13/17 00:16 97.0 54 21 90/45 97 Room Air  


 


7/12/17 20:00 97.3 50 19 109/69 97 Room Air  


 


7/12/17 16:00 97.9 50 18 112/60 96 Room Air  


 


7/12/17 13:32 97.7 71 18 117/60 94 Room Air  


 


7/12/17 09:41    105/64    

















Intake and Output  


 


 7/12/17 7/13/17





 19:00 07:00


 


Intake Total 500 ml 


 


Balance 500 ml 


 


  


 


IV Total 500 ml 








Height (Feet):  5


Height (Inches):  7.00


Weight (Pounds):  306


Objective


GENERAL:  Alert, awake, and oriented x3.


HEENT:  PERRLA.


NECK:  Range of motion is full in all directions.  No tenderness to


paracervical muscles.  No adenopathy


LUNGS:  Decreased breath sounds bilaterally


HEART:  S1 and S2 regular.


ABDOMEN:  Obese.


BACK:  tenderness to paraspinal muscles.  


EXTREMITIES:  No cyanosis.  No clubbing.


NEURO: No Changes.











SUSY AGUILERA Jul 13, 2017 09:05

## 2017-07-14 NOTE — DISCHARGE SUMMARY
Discharge Summary


Hospital Course


Date of Admission


Jul 10, 2017 at 19:19


Date of Discharge


Jul 13, 2017 at 17:43


Admitting Diagnosis


uncontrolled pain


HPI


Jaleesa Asif is a 56 year old female who was admitted on Jul 10, 2017 at 19:

19 for Uncontrolled Pain


Hospital Course


[id number]





Discharge


Discharge Disposition


Patient was discharged to SNF/Subacute Facility(03)


Discharge Diagnoses:  











Maria Ines Mcgrath NP Jul 14, 2017 14:03

## 2017-07-15 NOTE — DISCHARGE SUMMARY 2 SIG
DATE OF ADMISSION:  07/10/2017



DATE OF DISCHARGE:  07/13/2017



CONSULTANTS:

1. Nilda Arellano M.D.

2. Behnoush Zarrini, M.D.



BRIEF HOSPITAL COURSE:  The patient is a 56-year-old obese 

American female, who presented with right knee pain.  She has a history of

knee pain and has and was followed up by pain management doctor, Dr. Cruz

at [facility].  Chest pain was intractable and worse when laying in bed

and has been unable to walk.  She presented to ED.  The patient unable to

manage her medical condition at home and would need placement in a

rehabilitation facility.  She was admitted to medical floor for right knee

pain, osteoarthritis, hypertension, bipolar, depression, obesity, and

fibromyalgia.  She was continued on valsartan, Seroquel, Cymbalta, and

trazodone.  Pain management was consulted and was given Norco and

morphine.  She underwent a CT of the knee that showed osteoarthrosis with

small joint effusion.  She was given physical therapy and was eventually

discharged to a SNF to continue rehabilitation.



FINAL DIAGNOSES:

1. Right knee pain.

2. Hypertension.

3. Bipolar disorder.

4. Obesity.

5. Fibromyalgia.

6. Morbid obesity.

7. Right knee osteoarthritis.

8. Lumbar spondylosis with radiculopathy.







  ______________________________________________

  Medhat Vogt M.D.



I have been assigned to dictate discharge summary on this account and I

was not involved in the patient's management.



  ______________________________________________

  Maria Ines Mcgrath N.P.





DR:  ADAM

D:  07/14/2017 14:03

T:  07/15/2017 03:27

JOB#:  [id number]

CC:

## 2017-08-28 NOTE — EMERGENCY ROOM REPORT
History of Present Illness


General


Chief Complaint:  Pain


Source:  Patient, Medical Record, PMD





Present Illness


HPI


56YOF walk-in with chronic right knee pain


States lives on second floor, difficulty going upstairs


Using norco at home, Q8 hours. Requesting more narcotics.  Has Pain Mgmt at home


States had right knee surgery 2 weeks ago with Dr Fletcher - was not given 

followup appt.  States she and Dr Vogt are trying to find new orthopedist for 

her because she is unhappy with Dr Fletcher's care.


Denies trauma, right knee swelling, fever/chills, wound infection


Allergies:  


Coded Allergies:  


     LAMOTRIGINE (Verified  Allergy, Mild, Hives, 2/2/13)


     PENICILLINS (Verified  Allergy, Mild, Hives, 2/2/13)


     ERYTHROMYCIN BASE (Unverified  Allergy, Unknown, Rash, 2/25/15)


     PREGABALIN (Verified  Allergy, Unknown, 2/25/15)


 SWELLING


     TRAMADOL HCL (Verified  Allergy, Unknown, 2/25/15)


 VOMIT





Patient History


Past Medical History:  other - see chart


Past Surgical History:  other - knee surgery


Pertinent Family History:  none


Social History:  Denies: smoking, alcohol use, drug use


Last Menstrual Period:  Hyst


Pregnant Now:  No


Immunizations:  UTD


Reviewed Nursing Documentation:  PMH: Agreed, PSxH: Agreed





Nursing Documentation-PMH


Hx Cardiac Problems:  No


Hx Hypertension:  Yes


Hx Cancer:  No


Hx Gastrointestinal Problems:  No


History Of Psychiatric Problem:  Yes - Depression; Anxiety


Hx Neurological Problems:  Yes - Fibromyalgia


Hx Transient Ischemic Attacks:  Yes


Hx Syncope:  Yes





Review of Systems


All Other Systems:  negative except mentioned in HPI





Physical Exam





Vital Signs








  Date Time  Temp Pulse Resp B/P (MAP) Pulse Ox O2 Delivery O2 Flow Rate FiO2


 


8/28/17 13:41 99.1 74 18 154/89 98 Room Air  








Sp02 EP Interpretation:  reviewed, normal


General Appearance:  normal inspection, well appearing, no apparent distress, 

alert, GCS 15, non-toxic, obese


Head:  normocephalic, atraumatic


Eyes:  bilateral eye PERRL, bilateral eye EOMI


ENT:  normal ENT inspection, hearing grossly normal, normal voice


Neck:  normal inspection, full range of motion, supple, no bony tend


Respiratory:  normal inspection, lungs clear, normal breath sounds, no 

respiratory distress, no retraction, no wheezing


Cardiovascular #1:  regular rate, rhythm, no edema


Gastrointestinal:  normal inspection, normal bowel sounds, non tender, soft, no 

guarding, no hernia


Genitourinary:  no CVA tenderness


Musculoskeletal:  normal inspection, back normal, normal range of motion, Mauricio'

s Sign negative, other - Right knee: post-op surgical site 1cm on lateral 

aspect. No sign of infection.  No palpable ttp. No effusion.  No sign of 

infection.  ROM intact.


Neurologic:  normal inspection, alert, oriented x3, responsive, CNs III-XII nml 

as tested, motor strength/tone normal, speech normal


Psychiatric:  normal inspection, judgement/insight normal, mood/affect normal


Lymphatic:  normal inspection





Medical Decision Making


Diagnostic Impression:  


 Primary Impression:  


 Right knee pain


 Qualified Codes:  M25.561 - Pain in right knee; G89.29 - Other chronic pain


ER Course


Chronic right knee pain


Drug seeking behavior


VSS. Afebrile. Not septic or ill-appearing


Requesting placement in rehab however there is no legitimate reason for 

admission at this time


I spoke with PMD Dr Vogt - requesting patient be discharged for followup with 

him tomorrow in his office


Analgesia provided and transport arranged for patient home





Last Vital Signs








  Date Time  Temp Pulse Resp B/P (MAP) Pulse Ox O2 Delivery O2 Flow Rate FiO2


 


8/28/17 16:29 99.2       


 


8/28/17 15:50  70 18 141/89 98 Room Air  








Status:  improved


Disposition:  HOME, SELF-CARE


Condition:  Improved


Referrals:  


Medhat Vogt MD (PCP)


Patient Instructions:  Knee Pain, Easy-to-Read





Additional Instructions:  


- Dr Vogt requesting you call him to followup in clinic tomorrow afternoon











SHAKILA DANGELO M.D. Aug 28, 2017 18:01

## 2018-04-16 NOTE — EMERGENCY ROOM REPORT
History of Present Illness


General


Chief Complaint:  General Complaint


Source:  Patient, Medical Record





Present Illness


HPI


57-year-old female walked in with chief complaint of concern for dehydration


Endorses nausea without vomiting, and no diarrhea


Is tolerating oral intake


Urine is clear


Is complaining of pain to left knee


States she was at [facility] last week had MRI and x-ray with only findings of 

arthritis, was given knee immobilizer however she states that she doesn't use it

, needs PT, and is requesting admission for possible arthroscopic as she had 

with right knee


Allergies:  


Coded Allergies:  


     LAMOTRIGINE (Verified  Allergy, Mild, Hives, 2/2/13)


     PENICILLINS (Verified  Allergy, Mild, Hives, 2/2/13)


     ERYTHROMYCIN BASE (Unverified  Allergy, Unknown, Rash, 2/25/15)


     PREGABALIN (Verified  Allergy, Unknown, 2/25/15)


 SWELLING


     SERTRALINE (Verified  Allergy, Unknown, 4/16/18)


     TRAMADOL HCL (Verified  Allergy, Unknown, 2/25/15)


 VOMIT





Patient History


Past Medical History:  other - arthritis


Past Surgical History:  none


Pertinent Family History:  none


Social History:  Denies: smoking, alcohol use, drug use


Pregnant Now:  No


Immunizations:  UTD


Reviewed Nursing Documentation:  PMH: Agreed; PSxH: Agreed





Nursing Documentation-PMH


Past Medical History:  No History, Except For


Hx Cardiac Problems:  No


Hx Hypertension:  Yes


Hx Cancer:  No


Hx Gastrointestinal Problems:  No


Hx Neurological Problems:  Yes - Fibromyalgia


Hx Transient Ischemic Attacks:  Yes


Hx Syncope:  Yes





Review of Systems


All Other Systems:  negative except mentioned in HPI





Physical Exam





Vital Signs








  Date Time  Temp Pulse Resp B/P (MAP) Pulse Ox O2 Delivery O2 Flow Rate FiO2


 


4/16/18 12:18 98.3 64 18 134/76 95 Room Air  





 98.2       








Sp02 EP Interpretation:  reviewed, normal


General Appearance:  normal inspection, well appearing, no apparent distress, 

alert, GCS 15, non-toxic, obese


Head:  normocephalic, atraumatic


Eyes:  bilateral eye PERRL, bilateral eye EOMI


ENT:  normal ENT inspection, hearing grossly normal, normal pharynx, no 

angioedema, normal voice, TMs + canals normal, uvula midline, moist mucus 

membranes


Neck:  normal inspection, full range of motion, supple, thyroid normal, no 

meningismus, no bony tend


Respiratory:  normal inspection, lungs clear, normal breath sounds, no rhonchi, 

no respiratory distress, no retraction, no accessory muscle use, no wheezing, 

speaking full sentences


Cardiovascular #1:  regular rate, rhythm, no edema, no JVD, normal capillary 

refill


Gastrointestinal:  normal inspection, normal bowel sounds, non tender, soft, no 

mass, no peritonitis, non-distended, no guarding, no hernia, no pulsatile mass


Genitourinary:  no CVA tenderness


Musculoskeletal:  normal inspection, back normal, normal range of motion, no 

calf tenderness, pelvis stable, Mauricio's Sign negative, other - Right knee: 

Previous surgical scar.  left knee: No obvious deformity or swelling or 

infection. ROM intact.


Neurologic:  normal inspection, alert, oriented x3, responsive, CNs III-XII nml 

as tested, motor strength/tone normal, cerebellar normal, normal gait, speech 

normal


Psychiatric:  normal inspection, judgement/insight normal, mood/affect normal, 

no suicidal/homicidal ideation, no delusions


Skin:  normal inspection, normal color, no rash


Lymphatic:  normal inspection, no adenopathy





Medical Decision Making


Diagnostic Impression:  


 Primary Impression:  


 Encounter for generalized patient complaints


 Additional Impression:  


 Left knee pain


 Qualified Codes:  M25.562 - Pain in left knee; G89.29 - Other chronic pain


ER Course


VSS, afebrile


CMP cancelled by lab - redraw in process at time of signout





Patient's clinical exam not c/w dehdyration


She is tolerating PO after zofran, IVF


She and PMD requesting admission for further evaluation


Endorsed to Dr Vogt at 215pm


med/surg bed





Last Vital Signs








  Date Time  Temp Pulse Resp B/P (MAP) Pulse Ox O2 Delivery O2 Flow Rate FiO2


 


4/16/18 12:18 98.3 64 18 134/76 95 Room Air  





 98.2       








Status:  improved


Disposition:  ADMITTED AS INPATIENT


Condition:  Stable











SHAKILA DANGELO M.D. Apr 16, 2018 13:02

## 2018-04-17 NOTE — HISTORY AND PHYSICAL REPORT
DATE OF ADMISSION:  04/17/2018



CHIEF COMPLAINT:  The patient is a 57-year-old  female, who

presents with a chief complaint of left knee pain.



HISTORY OF PRESENT ILLNESS:  The patient had a slip and fall injury last

week.  The patient states "my left knee gave out."  The patient went to

[facility] and was given an immobilizer for the left knee.  The patient

states the pain and swelling on the left knee has increased over the last

couple of days.  The patient presented to [place] emergency room

complaining of left knee pain.  The patient is admitted for intractable

left knee pain.



PAST MEDICAL HISTORY:  Significant for,



1. Hypertension.

2. Obesity.

3. Fibromyalgia.

4. Degenerative disc disease of the spine.

5. Major depressive disorder.

6. Anxiety disorder.



PAST SURGICAL HISTORY:  Significant for,



1. Consuelo-en-Y gastric bypass surgery.

2. Total abdominal hysterectomy.

3. Arthroscopic surgery of the right knee x2.

4. Right foot surgery on the fifth toe.



CURRENT MEDICATIONS:

1. Soma 350 mg one tablet p.o. 3 times daily.

2. Vitamin B12 1000 mcg p.o. daily.

3. Cymbalta 60 mg p.o. twice daily.

4. Nexium 40 mg p.o. daily.

5. Norco 10/325 mg one tablet p.o. q.4 hours p.r.n.

6. Ativan 1 mg p.o. 3 times daily.

7. Propranolol 10 mg p.o. q.6 hours p.r.n.

8. Seroquel 300 mg p.o. at bedtime and 100 mg p.o. daily.

9. VESIcare 10 mg p.o. daily.

10. Trazodone 100 mg p.o. daily.

11. Trazodone 300 mg p.o. at bedtime.

12. Diovan 160 mg p.o. twice daily.

13. Ambien 10 mg p.o. at bedtime.



ALLERGIES:

1. Lamictal.

2. Penicillin.

3. Erythromycin.

4. Lyrica.

5. Tramadol.

6. Neurontin.

7. Zoloft.  However, the previous medical record states Zofran, this is

an error.



SOCIAL HISTORY:  The patient lives alone and is disabled.  The patient

lives in the apartment.  The patient denies tobacco or alcohol use.



REVIEW OF SYSTEMS:  CONSTITUTIONAL:  The patient denies weight loss or

weight gain.  The patient denies fevers or chills.  HEENT:  The patient

denies ear or throat pain.  The patient denies headache.  CARDIOVASCULAR:

The patient denies palpitations or chest pain.  CHEST:  The patient denies

wheeze or shortness of breath.  ABDOMINAL:  The patient denies nausea,

vomiting, diarrhea, or constipation.  GENITOURINARY:  The patient denies

dysuria or increased frequency of urination.  NEUROMUSCULAR:  The patient

denies seizures or generalized weakness.



PHYSICAL EXAMINATION:

VITAL SIGNS:  Temperature 97.2, respirations 20, pulse 63, and blood

pressure 113/57.

GENERAL:  The patient is a well-developed and well-nourished obese 

American female, in no apparent distress.

HEENT:  Eyes, pupils equal responsive to light and accommodation.

Extraocular movements are intact.

NECK:  Supple without lymphadenopathy.

CHEST:  Lungs are clear to auscultation bilaterally without wheezes or

rales.

CARDIOVASCULAR:  Regular rhythm and rate.  S1 and S2 normal without

murmurs, rubs, or gallops.

ABDOMEN:  Soft, nontender, and nondistended.  Positive bowel sounds.  No

evidence of hepatosplenomegaly.  Currently, no rebound or guarding

noted.

EXTREMITIES:  Negative for clubbing, cyanosis, or edema.

RECTAL/GENITAL:  Refused.

NEUROLOGIC:  Cranial nerves II through XII are grossly intact without focal

deficits.  Motor strength is 5/5 bilaterally.  Deep tendon reflexes are 2+

plantar.



LABORATORY STUDIES:  WBC 7.4, hemoglobin 11.8, hematocrit 36.7, and

platelets 331,000.  Sodium 135, potassium 3.8, chloride 103, CO2 27, BUN

22, creatinine 1.1, and glucose 95.



ASSESSMENT:  This is a 57-year-old  female.



1. Left knee pain.

2. Fall injury.

3. Hypertension.

4. Fibromyalgia.

5. Obesity.

6. Degenerative disc disease of the spine.

7. Major depressive disorder.

8. Anxiety disorder.



TREATMENT:

1. Left knee pain.  An x-ray of the left knee is pending.  An Orthopedic

consultation has been obtained with Dr. Fletcher.

2. Fall injury.

3. Hypertension.  Continue Diovan as above.

4. Fibromyalgia.  The patient is followed by the Pain Clinic at

[facility].

5. Obesity.  The patient is status post gastric bypass surgery.

6. Degenerative disc disease of the spine.

7. Major depressive disorder/anxiety disorder.  Continue trazodone and

Cymbalta as above.  Continue Seroquel as above.









  ______________________________________________

  Ludwin Fulton M.D.





DR:  SHELIA

D:  04/17/2018 17:45

T:  04/17/2018 18:42

JOB#:  [id number]

CC:

## 2018-04-17 NOTE — HISTORY & PHYSICAL
History and Physical


History & Physicial


Dictated for Int Med-Dr Vogt no. [id number]











EDER ROBB Apr 17, 2018 17:47

## 2018-04-17 NOTE — CONSULTATION
History of Present Illness


General


Date patient seen:  Apr 17, 2018


Chief Complaint:  General Complaint


Reason for Consultation:  inpatient management, dyspnea





Present Illness


HPI


57-year-old female with hx of morbid obesity, HTN, multiple gastric bypass 

surgery, walked in with chief complaint of increasing swelling and painful left 

knee.  Pt is asking for some type of injection for pain to the knee and also 

requesting orthoscopic surgery.  She is morbidly obese and chronically short of 

breath because of her weight.


Allergies:  


Coded Allergies:  


     LAMOTRIGINE (Verified  Allergy, Mild, Hives, 2/2/13)


     PENICILLINS (Verified  Allergy, Mild, Hives, 2/2/13)


     ERYTHROMYCIN BASE (Unverified  Allergy, Unknown, Rash, 2/25/15)


     PREGABALIN (Verified  Allergy, Unknown, 2/25/15)


 SWELLING


     SERTRALINE (Verified  Allergy, Unknown, 4/16/18)


     TRAMADOL HCL (Verified  Allergy, Unknown, 2/25/15)


 VOMIT





Medication History


Scheduled


Buspirone Hcl* (Buspar*), 30 MG ORAL BID, (Reported)


Cyanocobalamin (Vitamin B-12) (Vitamin B-12), 1,000 MCG PO DAILY, (Reported)


Duloxetine Hcl* (Cymbalta*), 60 MG PO BID, (Reported)


Duloxetine Hcl* (Cymbalta*), 60 MG ORAL DAILY, (Reported)


Gabapentin* (Neurontin*), 1,200 MG ORAL TID, (Reported)


Lorazepam* (Ativan*), 1 MG ORAL THREE TIMES A DAY, (Reported)


Quetiapine Fumarate (Seroquel), 300 MG ORAL HS, (Reported)


Quetiapine Fumarate* (Seroquel*), 100 MG PO DAILY, (Reported)


Solifenacin Succinate (Vesicare*), 10 MG ORAL DAILY, (Reported)


Solifenacin Succinate (Vesicare*), 10 MG ORAL DAILY, (Reported)


Trazodone* (Trazodone*), 100 MG ORAL DAILY, (Reported)


Trazodone* (Trazodone*), 300 MG ORAL BEDTIME, (Reported)


Valsartan (Diovan), 160 MG ORAL BID, (Reported)





Scheduled PRN


Albuterol Sulfate* (Albuterol Sulfate Hhn*), 3 ML INH Q4H PRN for Shortness of 

Breath, (Reported)


Carisoprodol* (Soma*), 350 MG PO TID PRN for For Pain, (Reported)


Esomeprazole Magnesium (Nexium), 40 MG ORAL DAILY PRN for Per rx protocol, (

Reported)


Hydrocodone/Acetaminophen (Hydrocodon-Acetaminophn ), 1 TAB ORAL Q4H PRN 

for For Pain, (Reported)


Ondansetron* (Zofran*), 4 MG ORAL Q6H PRN for Nausea & Vomiting, (Reported)


Propranolol Hcl* (Inderal*), 10 MG ORAL EVERY 6 HOURS PRN for palpitation, (

Reported)


Zolpidem Tartrate* (Ambien*), 10 MG ORAL HS PRN for Insomnia, (Reported)





Patient History


Healthcare decision maker





Resuscitation status





Advanced Directive on File








Past Medical/Surgical History


Past Medical/Surgical History:  


(1) Hypertension


(2) Bipolar 1 disorder, depressed


(3) Obesity (BMI 35.0-39.9 without comorbidity)





Review of Systems


Constitutional:  Reports: no symptoms


Eye:  Reports: no symptoms


Respiratory:  Reports: shortness of breath


Cardiovascular:  Reports: no symptoms


All Other Systems:  negative except mentioned in HPI





Physical Exam


General Appearance:  WD/WN, no apparent distress


Lines, tubes and drains:  peripheral, central line


HEENT:  normocephalic, atraumatic


Neck:  non-tender, normal alignment


Respiratory/Chest:  chest wall non-tender, lungs clear


Breasts:  no masses


Cardiovascular/Chest:  normal peripheral pulses


Abdomen:  normal bowel sounds


Genitourinary/Rectal:  normal genital exam


Extremities:  normal range of motion





Last 24 Hour Vital Signs








  Date Time  Temp Pulse Resp B/P (MAP) Pulse Ox O2 Delivery O2 Flow Rate FiO2


 


4/17/18 12:00 97.3 63 16 105/70  Room Air  





 97.3       


 


4/17/18 10:18  73  108/40    


 


4/17/18 08:00 97.3 95 20 98/38 98   





 97.3       


 


4/17/18 08:00      Room Air  


 


4/17/18 04:12 97.2       


 


4/17/18 04:00 96.3 62 20 124/73 99 Room Air  





 96.3       


 


4/17/18 00:01 97.2 63 20 113/57 94 Room Air  





 97.2       


 


4/16/18 16:00 97.5 62 18 135/79 100 Room Air  





 97.5       


 


4/16/18 15:20 98.3 64 18 138/69 100 Room Air  





 98.3       


 


4/16/18 15:10 98.3 64 18 138/69 100 Room Air  





 98.3       

















Intake and Output  


 


 4/16/18 4/17/18





 19:00 07:00


 


Intake Total 495 ml 1785 ml


 


Balance 495 ml 1785 ml


 


  


 


Intake Oral 420 ml 960 ml


 


IV Total 75 ml 825 ml


 


# Voids 2 3








Height (Feet):  5


Height (Inches):  7.00


Weight (Pounds):  302


Medications





Current Medications








 Medications


  (Trade)  Dose


 Ordered  Sig/Tapan


 Route


 PRN Reason  Start Time


 Stop Time Status Last Admin


Dose Admin


 


 Acetaminophen/


 Hydrocodone Bitart


  (Norco 10/325)  1 tab  Q4H  PRN


 ORAL


 For Pain  4/16/18 16:00


 4/23/18 15:59  4/17/18 13:28


 


 


 Albuterol Sulfate


  (Proventil)  2.5 mg  Q4H  PRN


 HHN


 Shortness of Breath  4/16/18 16:00


 4/21/18 15:59   


 


 


 Buspirone HCl


  (Buspar)  30 mg  Q12HR


 ORAL


   4/16/18 21:00


 5/16/18 20:59   


 


 


 Carisoprodol


  (Soma)  350 mg  TID  PRN


 ORAL


 Muscle Spasm  4/16/18 16:30


 5/16/18 15:59   


 


 


 Cyanocobalamin


  (Vitamin B-12)  1,000 mcg  DAILY


 ORAL


   4/17/18 09:00


 5/17/18 08:59  4/17/18 10:10


 


 


 Diphenhydramine


 HCl


  (Benadryl)  50 mg  Q8HR  PRN


 ORAL


 Itching  4/16/18 20:30


 5/16/18 20:29  4/16/18 21:10


 


 


 Duloxetine HCl


  (Cymbalta)  60 mg  Q12HR


 ORAL


   4/16/18 21:00


 5/16/18 20:59  4/17/18 10:05


 


 


 Gabapentin


  (Neurontin)  1,200 mg  Q8HR


 ORAL


   4/16/18 22:00


 5/16/18 21:59   


 


 


 Heparin Sodium


  (Porcine)


  (Heparin 5000


 units/ml)  5,000 units  EVERY 8  HOURS


 SUBQ


   4/16/18 22:00


 5/16/18 21:59  4/17/18 14:42


 


 


 Lorazepam


  (Ativan)  1 mg  Q8HR


 ORAL


   4/16/18 22:00


 4/23/18 21:59  4/17/18 14:38


 


 


 Ondansetron HCl


  (Zofran)  4 mg  Q6H  PRN


 ORAL


 Nausea & Vomiting  4/16/18 16:00


 5/16/18 15:59  4/16/18 17:14


 


 


 Pantoprazole


  (Protonix)  40 mg  DAILY


 ORAL


   4/17/18 09:00


 5/17/18 08:59  4/17/18 10:05


 


 


 Patient Own


 Medication


  (Patient's Own


 Med)  1 ea  QID


 TOPIC


   4/16/18 21:00


 5/16/18 20:59  4/17/18 10:06


 


 


 Propranolol HCl


  (Inderal)  10 mg  EVERY 6 HOURS  PRN


 ORAL


 palpitation  4/16/18 16:00


 5/16/18 15:59   


 


 


 Quetiapine


 Fumarate


  (SEROquel)  300 mg  QHS


 ORAL


   4/16/18 21:00


 5/16/18 20:59  4/16/18 21:09


 











Assessment/Plan


Problem List:  


(1) Left knee pain


ICD Codes:  M25.562 - Pain in left knee


SNOMED:  50346066


(2) chronic dyspnea


(3) Hypertension


ICD Codes:  I10 - Essential (primary) hypertension


SNOMED:  64499732


(4) Obesity (BMI 35.0-39.9 without comorbidity)


ICD Codes:  E66.9 - Obesity, unspecified


SNOMED:  330458858, [id number]


Assessment/Plan


Rheumatology evaluation


respiratory treatment


pain management


check electrolytes, bun/creatinine


dietary consult











Nilda Arellano MD Apr 17, 2018 15:05

## 2018-04-18 NOTE — PULMONOLOGY PROGRESS NOTE
Assessment/Plan


Problems:  


(1) Left knee pain


(2) chronic dyspnea


(3) Hypertension


(4) Obesity (BMI 35.0-39.9 without comorbidity)


Assessment/Plan


awaiting Rheumatology consult


pain consult appreciated


monitor BP


symptomatic treatment


dvt prophyalxis


respiratory treatment


titrate fio2 to sat of 92%





Subjective


ROS Limited/Unobtainable:  No


Interval Events:  still c/o left knee pain


Constitutional:  Reports: no symptoms


HEENT:  Repors: no symptoms


Respiratory:  Reports: no symptoms


Allergies:  


Coded Allergies:  


     LAMOTRIGINE (Verified  Allergy, Mild, Hives, 2/2/13)


     PENICILLINS (Verified  Allergy, Mild, Hives, 2/2/13)


     ERYTHROMYCIN BASE (Unverified  Allergy, Unknown, Rash, 2/25/15)


     PREGABALIN (Verified  Allergy, Unknown, 2/25/15)


 SWELLING


     SERTRALINE (Verified  Allergy, Unknown, 4/16/18)


     TRAMADOL HCL (Verified  Allergy, Unknown, 2/25/15)


 VOMIT





Objective





Last 24 Hour Vital Signs








  Date Time  Temp Pulse Resp B/P (MAP) Pulse Ox O2 Delivery O2 Flow Rate FiO2


 


4/18/18 11:30  65 18 108/68 97 Room Air  


 


4/18/18 11:00   20 108/48 96 Room Air  


 


4/18/18 10:45 97.9 63 20 115/53 98 Room Air  





 97.9       


 


4/18/18 08:53 99.6       


 


4/18/18 08:00 99.6 96 16 106/43 97 Room Air  





 99.6       


 


4/18/18 04:00 97.5 65 22 106/55 96   





 97.5       


 


4/18/18 00:00 97.6 70 22 97/55 97   





 97.6       


 


4/17/18 20:00 97.8 71 22 120/67 97   





 97.8       


 


4/17/18 16:00 97.3 74 20 127/70 99   





 97.3       


 


4/17/18 16:00      Room Air  

















Intake and Output  


 


 4/17/18 4/18/18





 19:00 07:00


 


Intake Total 300 ml 240 ml


 


Balance 300 ml 240 ml


 


  


 


Intake Oral 300 ml 240 ml


 


# Voids 4 3








General Appearance:  WD/WN


HEENT:  normocephalic, atraumatic


Respiratory/Chest:  chest wall non-tender, decreased breath sounds


Abdomen:  normal bowel sounds


Genitourinary:  normal external genitalia


Extremities:  no cyanosis


Skin:  no rash, no ulcers


Musculoskeletal:  other - left knee swalling


Laboratory Tests


4/18/18 08:10: 


White Blood Count 8.0, Red Blood Count 3.37L, Hemoglobin 10.3L, Hematocrit 31.8L

, Mean Corpuscular Volume 94, Mean Corpuscular Hemoglobin 30.5, Mean 

Corpuscular Hemoglobin Concent 32.3, Red Cell Distribution Width 15.0H, 

Platelet Count 260, Mean Platelet Volume 5.9L, Neutrophils (%) (Auto) 69.0, 

Lymphocytes (%) (Auto) 23.8, Monocytes (%) (Auto) 4.3, Eosinophils (%) (Auto) 

1.8, Basophils (%) (Auto) 1.1, Sodium Level 136, Potassium Level 3.7, Chloride 

Level 102, Carbon Dioxide Level 26, Anion Gap 8, Blood Urea Nitrogen 17, 

Creatinine 1.0, Estimat Glomerular Filtration Rate 57.6, Glucose Level 240H, 

Calcium Level 7.9L





Current Medications








 Medications


  (Trade)  Dose


 Ordered  Sig/Tapan


 Route


 PRN Reason  Start Time


 Stop Time Status Last Admin


Dose Admin


 


 Acetaminophen/


 Hydrocodone Bitart


  (Norco 10/325)  1 tab  Q4H  PRN


 ORAL


 Moderate Pain (Pain Scale 4-6)  4/17/18 18:00


 4/23/18 17:59   


 


 


 Albuterol Sulfate


  (Proventil)  2.5 mg  Q4H  PRN


 HHN


 Shortness of Breath  4/16/18 16:00


 4/21/18 15:59   


 


 


 Carisoprodol


  (Soma)  350 mg  TID  PRN


 ORAL


 Muscle Spasm  4/16/18 16:30


 5/16/18 15:59   


 


 


 Cyanocobalamin


  (Vitamin B-12)  1,000 mcg  DAILY


 ORAL


   4/17/18 09:00


 5/17/18 08:59  4/18/18 08:21


 


 


 Diphenhydramine


 HCl


  (Benadryl)  50 mg  Q8HR  PRN


 ORAL


 Itching  4/16/18 20:30


 5/16/18 20:29  4/16/18 21:10


 


 


 Duloxetine HCl


  (Cymbalta)  60 mg  Q12HR


 ORAL


   4/16/18 21:00


 5/16/18 20:59  4/18/18 08:22


 


 


 Heparin Sodium


  (Porcine)


  (Heparin 5000


 units/ml)  5,000 units  EVERY 8  HOURS


 SUBQ


   4/16/18 22:00


 5/16/18 21:59  4/18/18 06:29


 


 


 Hydromorphone HCl


  (Dilaudid)  1 mg  Q4H  PRN


 IVP


 Severe Pain (Pain Scale 7-10)  4/17/18 17:45


 4/24/18 17:44  4/18/18 08:23


 


 


 Lidocaine


  (Lidoderm 5%


 PATCH)  1 patch  DAILY


 TDERMAL


   4/18/18 09:45


 5/18/18 09:44  4/18/18 10:03


 


 


 Lorazepam


  (Ativan)  1 mg  Q8HR


 ORAL


   4/16/18 22:00


 4/23/18 21:59  4/18/18 06:29


 


 


 Ondansetron HCl


  (Zofran)  4 mg  Q6H  PRN


 ORAL


 Nausea & Vomiting  4/16/18 16:00


 5/16/18 15:59  4/17/18 16:46


 


 


 Pantoprazole


  (Protonix)  40 mg  DAILY


 ORAL


   4/17/18 09:00


 5/17/18 08:59  4/18/18 06:29


 


 


 Patient Own


 Medication


  (Patient's Own


 Med)  1 ea  QID


 TOPIC


   4/16/18 21:00


 5/16/18 20:59  4/17/18 21:32


 


 


 Propranolol HCl


  (Inderal)  10 mg  EVERY 6 HOURS  PRN


 ORAL


 palpitation  4/16/18 16:00


 5/16/18 15:59   


 


 


 Quetiapine


 Fumarate


  (SEROquel)  300 mg  QHS


 ORAL


   4/16/18 21:00


 5/16/18 20:59  4/17/18 21:32


 


 


 Trazodone HCl


  (Desyrel)  300 mg  BEDTIME


 ORAL


   4/18/18 21:00


 5/18/18 20:59   


 

















Nilda Arellano MD Apr 18, 2018 12:22

## 2018-04-18 NOTE — INTERNAL MED PROGRESS NOTE
Subjective


Date of Service:  Apr 18, 2018


Physician Name


Eder Robb


Attending Physician


Medhat Vogt MD





Current Medications








 Medications


  (Trade)  Dose


 Ordered  Sig/Tapan


 Route


 PRN Reason  Start Time


 Stop Time Status Last Admin


Dose Admin


 


 Acetaminophen/


 Hydrocodone Bitart


  (Norco 10/325)  1 tab  Q4H  PRN


 ORAL


 Moderate Pain (Pain Scale 4-6)  4/17/18 18:00


 4/23/18 17:59   


 


 


 Albuterol Sulfate


  (Proventil)  2.5 mg  Q4H  PRN


 HHN


 Shortness of Breath  4/16/18 16:00


 4/21/18 15:59   


 


 


 Carisoprodol


  (Soma)  350 mg  TID  PRN


 ORAL


 Muscle Spasm  4/16/18 16:30


 5/16/18 15:59   


 


 


 Cyanocobalamin


  (Vitamin B-12)  1,000 mcg  DAILY


 ORAL


   4/17/18 09:00


 5/17/18 08:59  4/18/18 08:21


 


 


 Diphenhydramine


 HCl


  (Benadryl)  50 mg  Q8HR  PRN


 ORAL


 Itching  4/16/18 20:30


 5/16/18 20:29  4/16/18 21:10


 


 


 Duloxetine HCl


  (Cymbalta)  60 mg  Q12HR


 ORAL


   4/16/18 21:00


 5/16/18 20:59  4/18/18 08:22


 


 


 Heparin Sodium


  (Porcine)


  (Heparin 5000


 units/ml)  5,000 units  EVERY 8  HOURS


 SUBQ


   4/16/18 22:00


 5/16/18 21:59  4/18/18 06:29


 


 


 Hydromorphone HCl


  (Dilaudid)  1 mg  Q4H  PRN


 IVP


 Severe Pain (Pain Scale 7-10)  4/17/18 17:45


 4/24/18 17:44  4/18/18 08:23


 


 


 Lidocaine


  (Lidoderm 5%


 PATCH)  1 patch  DAILY


 TDERMAL


   4/18/18 09:45


 5/18/18 09:44  4/18/18 10:03


 


 


 Lorazepam


  (Ativan)  1 mg  Q8HR


 ORAL


   4/16/18 22:00


 4/23/18 21:59  4/18/18 06:29


 


 


 Ondansetron HCl


  (Zofran)  4 mg  Q6H  PRN


 ORAL


 Nausea & Vomiting  4/16/18 16:00


 5/16/18 15:59  4/17/18 16:46


 


 


 Pantoprazole


  (Protonix)  40 mg  DAILY


 ORAL


   4/17/18 09:00


 5/17/18 08:59  4/18/18 06:29


 


 


 Patient Own


 Medication


  (Patient's Own


 Med)  1 ea  QID


 TOPIC


   4/16/18 21:00


 5/16/18 20:59  4/17/18 21:32


 


 


 Propranolol HCl


  (Inderal)  10 mg  EVERY 6 HOURS  PRN


 ORAL


 palpitation  4/16/18 16:00


 5/16/18 15:59   


 


 


 Quetiapine


 Fumarate


  (SEROquel)  300 mg  QHS


 ORAL


   4/16/18 21:00


 5/16/18 20:59  4/17/18 21:32


 


 


 Trazodone HCl


  (Desyrel)  300 mg  BEDTIME


 ORAL


   4/18/18 21:00


 5/18/18 20:59 UNV  


 








Allergies:  


Coded Allergies:  


     LAMOTRIGINE (Verified  Allergy, Mild, Hives, 2/2/13)


     PENICILLINS (Verified  Allergy, Mild, Hives, 2/2/13)


     ERYTHROMYCIN BASE (Unverified  Allergy, Unknown, Rash, 2/25/15)


     PREGABALIN (Verified  Allergy, Unknown, 2/25/15)


 SWELLING


     SERTRALINE (Verified  Allergy, Unknown, 4/16/18)


     TRAMADOL HCL (Verified  Allergy, Unknown, 2/25/15)


 VOMIT


ROS Limited/Unobtainable:  No


Constitutional:  Reports: no symptoms


HEENT:  Reports: no symptoms


Cardiovascular:  Reports: no symptoms


Respiratory:  Reports: no symptoms


Gastrointestinal/Abdominal:  Reports: no symptoms


Neurologic/Psychiatric:  Reports: no symptoms


Subjective


56 YO F admitted with left knee pain.  Fell this am getting out of bed.  Denies 

pain.  Cover for Int Med-Dr Vogt.





Objective





Last Vital Signs








  Date Time  Temp Pulse Resp B/P (MAP) Pulse Ox O2 Delivery O2 Flow Rate FiO2


 


4/18/18 10:45 97.9 63 20 115/53 98 Room Air  





 97.9       








General Appearance:  WD/WN, no apparent distress, alert, obese


EENT:  PERRL/EOMI, normal ENT inspection


Neck:  non-tender, normal alignment, supple


Cardiovascular:  normal peripheral pulses, normal rate, regular rhythm, no 

gallop/murmur, no JVD


Respiratory/Chest:  chest wall non-tender, lungs clear, normal breath sounds, 

no respiratory distress, no accessory muscle use


Abdomen:  normal bowel sounds, non tender, soft, no organomegaly, no mass


Extremities:  normal range of motion, non-tender


Neurologic:  CNs II-XII grossly normal, no motor/sensory deficits


Skin:  normal pigmentation, warm/dry





Laboratory Tests








Test


  4/18/18


08:10


 


White Blood Count


  8.0 K/UL


(4.8-10.8)


 


Red Blood Count


  3.37 M/UL


(4.20-5.40)  L


 


Hemoglobin


  10.3 G/DL


(12.0-16.0)  L


 


Hematocrit


  31.8 %


(37.0-47.0)  L


 


Mean Corpuscular Volume 94 FL (80-99)  


 


Mean Corpuscular Hemoglobin


  30.5 PG


(27.0-31.0)


 


Mean Corpuscular Hemoglobin


Concent 32.3 G/DL


(32.0-36.0)


 


Red Cell Distribution Width


  15.0 %


(11.6-14.8)  H


 


Platelet Count


  260 K/UL


(150-450)


 


Mean Platelet Volume


  5.9 FL


(6.5-10.1)  L


 


Neutrophils (%) (Auto)


  69.0 %


(45.0-75.0)


 


Lymphocytes (%) (Auto)


  23.8 %


(20.0-45.0)


 


Monocytes (%) (Auto)


  4.3 %


(1.0-10.0)


 


Eosinophils (%) (Auto)


  1.8 %


(0.0-3.0)


 


Basophils (%) (Auto)


  1.1 %


(0.0-2.0)


 


Sodium Level


  136 MMOL/L


(136-145)


 


Potassium Level


  3.7 MMOL/L


(3.5-5.1)


 


Chloride Level


  102 MMOL/L


()


 


Carbon Dioxide Level


  26 MMOL/L


(21-32)


 


Anion Gap


  8 mmol/L


(5-15)


 


Blood Urea Nitrogen


  17 mg/dL


(7-18)


 


Creatinine


  1.0 MG/DL


(0.55-1.30)


 


Estimat Glomerular Filtration


Rate 57.6 mL/min


(>60)


 


Glucose Level


  240 MG/DL


()  H


 


Calcium Level


  7.9 MG/DL


(8.5-10.1)  L

















Intake and Output  


 


 4/17/18 4/18/18





 19:00 07:00


 


Intake Total 300 ml 240 ml


 


Balance 300 ml 240 ml


 


  


 


Intake Oral 300 ml 240 ml


 


# Voids 4 3











Assessment/Plan


Problem List:  


(1) DDD (degenerative disc disease)


(2) Left knee pain


Assessment & Plan:  X-ray = osteoarthritis.  Continue norco and dilaudid





(3) Hypertension


(4) Fibromyalgia


(5) Obesity (BMI 35.0-39.9 without comorbidity)


(6) Bipolar 1 disorder, depressed


Assessment & Plan:  See psych note.  Continue trazodone, cymbalta and seroquel





(7) Osteoarthritis


(8) Osteoarthritis of left knee


Assessment/Plan


Discharge planning:  [facility]











ROBB,EDER Apr 18, 2018 11:29

## 2018-04-18 NOTE — DIAGNOSTIC IMAGING REPORT
Indication: Fall, trauma, pain

 

Technique: 2 views of the right knee

 

Comparison: CT scan dated 7/11/2017

 

Findings: Exam is limited, due to the availability of only 2 orthogonal views. There

is mild medial compartmental degenerative joint space narrowing. No acute fractures.

No dislocations. No definite suprapatellar effusion

 

Impression: Degenerative changes

 

No evidence of acute bony trauma

## 2018-04-18 NOTE — GENERAL PROGRESS NOTE
Assessment/Plan


Status:  stable


Assessment/Plan





mdd


anxiety 





trazodone


Seroquel


Cymbalta





Subjective


Date patient seen:  Apr 18, 2018


Neurologic/Psychiatric:  Reports: anxiety, depressed, emotional problems


Allergies:  


Coded Allergies:  


     LAMOTRIGINE (Verified  Allergy, Mild, Hives, 2/2/13)


     PENICILLINS (Verified  Allergy, Mild, Hives, 2/2/13)


     ERYTHROMYCIN BASE (Unverified  Allergy, Unknown, Rash, 2/25/15)


     PREGABALIN (Verified  Allergy, Unknown, 2/25/15)


 SWELLING


     SERTRALINE (Verified  Allergy, Unknown, 4/16/18)


     TRAMADOL HCL (Verified  Allergy, Unknown, 2/25/15)


 VOMIT


Subjective


the pt fell on her knees. sleep not adequate not taking trazodone





Objective





Last 24 Hour Vital Signs








  Date Time  Temp Pulse Resp B/P (MAP) Pulse Ox O2 Delivery O2 Flow Rate FiO2


 


4/18/18 15:30  68 20 107/57 100 Room Air  


 


4/18/18 14:30  70 20 109/62 98 Room Air  


 


4/18/18 13:30  80 20 136/74 99 Room Air  


 


4/18/18 12:30  92 20 106/58 98 Room Air  


 


4/18/18 11:30  65 18 108/68 97 Room Air  


 


4/18/18 11:15   20 108/58 97 Room Air  


 


4/18/18 11:00   20 108/48 96 Room Air  


 


4/18/18 10:45 97.9 63 20 115/53 98 Room Air  





 97.9       


 


4/18/18 08:53 99.6       


 


4/18/18 08:00 99.6 96 16 106/43 97 Room Air  





 99.6       


 


4/18/18 04:00 97.5 65 22 106/55 96   





 97.5       


 


4/18/18 00:00 97.6 70 22 97/55 97   





 97.6       


 


4/17/18 20:00 97.8 71 22 120/67 97   





 97.8       

















Intake and Output  


 


 4/17/18 4/18/18





 18:59 06:59


 


Intake Total 300 ml 240 ml


 


Balance 300 ml 240 ml


 


  


 


Intake Oral 300 ml 240 ml


 


# Voids 4 3








Laboratory Tests


4/18/18 08:10: 


White Blood Count 8.0, Red Blood Count 3.37L, Hemoglobin 10.3L, Hematocrit 31.8L

, Mean Corpuscular Volume 94, Mean Corpuscular Hemoglobin 30.5, Mean 

Corpuscular Hemoglobin Concent 32.3, Red Cell Distribution Width 15.0H, 

Platelet Count 260, Mean Platelet Volume 5.9L, Neutrophils (%) (Auto) 69.0, 

Lymphocytes (%) (Auto) 23.8, Monocytes (%) (Auto) 4.3, Eosinophils (%) (Auto) 

1.8, Basophils (%) (Auto) 1.1, Sodium Level 136, Potassium Level 3.7, Chloride 

Level 102, Carbon Dioxide Level 26, Anion Gap 8, Blood Urea Nitrogen 17, 

Creatinine 1.0, Estimat Glomerular Filtration Rate 57.6, Glucose Level 240H, 

Calcium Level 7.9L


Height (Feet):  5


Height (Inches):  7.00


Weight (Pounds):  302


General Appearance:  no apparent distress, alert


Neurologic:  oriented x 3, responsive, depressed affect











Cassandra Colon M.D. Apr 18, 2018 18:21

## 2018-04-18 NOTE — DIAGNOSTIC IMAGING REPORT
Indication: Pain

 

Technique: 3 views of the left knee

 

Comparison: None

 

Findings:There is mild degenerative narrowing of the medial joint compartment. There

are patellofemoral degenerative proliferative changes. There is slight irregularity

of the distal femoral chondral surface, side indeterminate but suspect the medial

condyle, seen on the lateral view.

 

Impression: Osteochondral defect, probably of the medial femoral condyle, likely

degenerative in nature but could also be posttraumatic

 

No acute bony trauma

 

Degenerative changes, as described

## 2018-04-18 NOTE — GENERAL PROGRESS NOTE
Assessment/Plan


Assessment/Plan


(1) Morbid Obesity


(2) B/L knee pain


(3) B/L knee Osteoarthritis


(4) Lumbar DDD


(5) Lumbar Spondylosis


(6) Lumbar Radiculopathy


Patient will be continued on  Norco and Dilaudid


We recommend patient to be seen by Orthopedic surgeon. 


D/w Dr. Klein and he concurred.





Subjective


Date patient seen:  Apr 18, 2018


Time patient seen:  07:00 - am


Allergies:  


Coded Allergies:  


     LAMOTRIGINE (Verified  Allergy, Mild, Hives, 2/2/13)


     PENICILLINS (Verified  Allergy, Mild, Hives, 2/2/13)


     ERYTHROMYCIN BASE (Unverified  Allergy, Unknown, Rash, 2/25/15)


     PREGABALIN (Verified  Allergy, Unknown, 2/25/15)


 SWELLING


     SERTRALINE (Verified  Allergy, Unknown, 4/16/18)


     TRAMADOL HCL (Verified  Allergy, Unknown, 2/25/15)


 VOMIT


Subjective


REVIEW OF SYSTEMS:  Denies rash, fever, chills, sweating, dizziness,


drowsiness, blurred vision, sore throat, and change in weight.  No


shortness of breath or chest pain.  No nausea, vomiting, diarrhea, or


blood in the stool or urine.  No bowel or bladder incontinence.  No


dysuria.  She is complaining of B/L knee pain and low back pain.





SUBJECTIVE: Patient is a known patient from prior admission. 


She has been c/o B/L knee pain Xray was done showing 


DJD. Recommend Ortho eval. At this time is on Norco


10/325mg PO 1 tab Q4H PRN mod pain and Dilaudid 1mg IV 


Q4H PRN severe pain. The pain has been tolerated on the 


Dilaudid. She has no other complaints.





Objective





Last 24 Hour Vital Signs








  Date Time  Temp Pulse Resp B/P (MAP) Pulse Ox O2 Delivery O2 Flow Rate FiO2


 


4/18/18 08:00 99.6 96 16 106/43 97 Room Air  





 99.6       


 


4/18/18 04:00 97.5 65 22 106/55 96   





 97.5       


 


4/18/18 00:00 97.6 70 22 97/55 97   





 97.6       


 


4/17/18 20:00 97.8 71 22 120/67 97   





 97.8       


 


4/17/18 16:00 97.3 74 20 127/70 99   





 97.3       


 


4/17/18 16:00      Room Air  


 


4/17/18 12:00 97.3 63 16 105/70  Room Air  





 97.3       


 


4/17/18 10:18  73  108/40    

















Intake and Output  


 


 4/17/18 4/18/18





 19:00 07:00


 


Intake Total 300 ml 240 ml


 


Balance 300 ml 240 ml


 


  


 


Intake Oral 300 ml 240 ml


 


# Voids 4 3








Laboratory Tests


4/18/18 08:10: 


White Blood Count 8.0, Red Blood Count 3.37L, Hemoglobin 10.3L, Hematocrit 31.8L

, Mean Corpuscular Volume 94, Mean Corpuscular Hemoglobin 30.5, Mean 

Corpuscular Hemoglobin Concent 32.3, Red Cell Distribution Width 15.0H, 

Platelet Count 260, Mean Platelet Volume 5.9L, Neutrophils (%) (Auto) 69.0, 

Lymphocytes (%) (Auto) 23.8, Monocytes (%) (Auto) 4.3, Eosinophils (%) (Auto) 

1.8, Basophils (%) (Auto) 1.1, Sodium Level 136, Potassium Level 3.7, Chloride 

Level 102, Carbon Dioxide Level 26, Anion Gap 8, Blood Urea Nitrogen 17, 

Creatinine 1.0, Estimat Glomerular Filtration Rate 57.6, Glucose Level 240H, 

Calcium Level 7.9L


Height (Feet):  5


Height (Inches):  7.00


Weight (Pounds):  302


Objective


GENERAL:  Alert, awake, and oriented x3.


HEENT:  PERRLA.


NECK:  Range of motion is full in all directions.  No tenderness to


paracervical muscles.  No adenopathy


LUNGS:  Decreased breath sounds bilaterally


HEART:  S1 and S2 regular.


ABDOMEN:  Obese.


BACK:  tenderness to paraspinal muscles.  


EXTREMITIES:  No cyanosis.  No clubbing.


NEURO: No Changes.











SUSY AGUILERA Apr 18, 2018 08:48

## 2018-04-18 NOTE — CONSULTATION
History of Present Illness


General


Date patient seen:  Apr 17, 2018


Chief Complaint:  General Complaint


Reason for Consultation:  inpatient management, dyspnea





Present Illness


HPI


57-year-old  female, who presents with a chief complaint of 

left knee pain. the pt has hx of depression and anxiety. the pt is currently on 

mm


Allergies:  


Coded Allergies:  


     LAMOTRIGINE (Verified  Allergy, Mild, Hives, 2/2/13)


     PENICILLINS (Verified  Allergy, Mild, Hives, 2/2/13)


     ERYTHROMYCIN BASE (Unverified  Allergy, Unknown, Rash, 2/25/15)


     PREGABALIN (Verified  Allergy, Unknown, 2/25/15)


 SWELLING


     SERTRALINE (Verified  Allergy, Unknown, 4/16/18)


     TRAMADOL HCL (Verified  Allergy, Unknown, 2/25/15)


 VOMIT





Medication History


Scheduled


Buspirone Hcl* (Buspar*), 30 MG ORAL BID, (Reported)


Cyanocobalamin (Vitamin B-12) (Vitamin B-12), 1,000 MCG PO DAILY, (Reported)


Duloxetine Hcl* (Cymbalta*), 60 MG PO BID, (Reported)


Duloxetine Hcl* (Cymbalta*), 60 MG ORAL DAILY, (Reported)


Gabapentin* (Neurontin*), 1,200 MG ORAL TID, (Reported)


Lorazepam* (Ativan*), 1 MG ORAL THREE TIMES A DAY, (Reported)


Quetiapine Fumarate (Seroquel), 300 MG ORAL HS, (Reported)


Quetiapine Fumarate* (Seroquel*), 100 MG PO DAILY, (Reported)


Solifenacin Succinate (Vesicare*), 10 MG ORAL DAILY, (Reported)


Solifenacin Succinate (Vesicare*), 10 MG ORAL DAILY, (Reported)


Trazodone* (Trazodone*), 100 MG ORAL DAILY, (Reported)


Trazodone* (Trazodone*), 300 MG ORAL BEDTIME, (Reported)


Valsartan (Diovan), 160 MG ORAL BID, (Reported)





Scheduled PRN


Albuterol Sulfate* (Albuterol Sulfate Hhn*), 3 ML INH Q4H PRN for Shortness of 

Breath, (Reported)


Carisoprodol* (Soma*), 350 MG PO TID PRN for For Pain, (Reported)


Esomeprazole Magnesium (Nexium), 40 MG ORAL DAILY PRN for Per rx protocol, (

Reported)


Hydrocodone/Acetaminophen (Hydrocodon-Acetaminophn ), 1 TAB ORAL Q4H PRN 

for For Pain, (Reported)


Ondansetron* (Zofran*), 4 MG ORAL Q6H PRN for Nausea & Vomiting, (Reported)


Propranolol Hcl* (Inderal*), 10 MG ORAL EVERY 6 HOURS PRN for palpitation, (

Reported)


Zolpidem Tartrate* (Ambien*), 10 MG ORAL HS PRN for Insomnia, (Reported)





Patient History


History Provided By:  Patient, Significant Other, PMD


Healthcare decision maker





Resuscitation status





Advanced Directive on File








Past Medical/Surgical History


Past Medical/Surgical History:  


(1) Diarrhea


(2) Weakness


(3) Osteomyelitis


(4) Right knee pain


(5) Palpitations


(6) Diarrhea


(7) Acute coronary syndrome


(8) Methamphetamine abuse


(9) Ankle edema


(10) Chest pain of uncertain etiology


(11) Dehydration


(12) Left knee pain


(13) Encounter for generalized patient complaints


(14) Nausea


(15) Hypertension


(16) Bipolar 1 disorder, depressed


(17) Obesity (BMI 35.0-39.9 without comorbidity)


(18) Left knee pain


(19) chronic dyspnea


(20) Fibromyalgia


(21) DDD (degenerative disc disease)


(22) Osteoarthritis


(23) Osteoarthritis of left knee





Review of Systems


Psychiatric:  Reports: see HPI, prior hx, anxiety, depressed feelings, 

emotional problems





Physical Exam


General Appearance:  no apparent distress, alert


Neurologic:  oriented x 3, responsive, depressed affect





Last 24 Hour Vital Signs








  Date Time  Temp Pulse Resp B/P (MAP) Pulse Ox O2 Delivery O2 Flow Rate FiO2


 


4/18/18 15:30  68 20 107/57 100 Room Air  


 


4/18/18 14:30  70 20 109/62 98 Room Air  


 


4/18/18 13:30  80 20 136/74 99 Room Air  


 


4/18/18 12:30  92 20 106/58 98 Room Air  


 


4/18/18 11:30  65 18 108/68 97 Room Air  


 


4/18/18 11:15   20 108/58 97 Room Air  


 


4/18/18 11:00   20 108/48 96 Room Air  


 


4/18/18 10:45 97.9 63 20 115/53 98 Room Air  





 97.9       


 


4/18/18 08:53 99.6       


 


4/18/18 08:00 99.6 96 16 106/43 97 Room Air  





 99.6       


 


4/18/18 04:00 97.5 65 22 106/55 96   





 97.5       


 


4/18/18 00:00 97.6 70 22 97/55 97   





 97.6       


 


4/17/18 20:00 97.8 71 22 120/67 97   





 97.8       

















Intake and Output  


 


 4/17/18 4/18/18





 18:59 06:59


 


Intake Total 300 ml 240 ml


 


Balance 300 ml 240 ml


 


  


 


Intake Oral 300 ml 240 ml


 


# Voids 4 3











Laboratory Tests








Test


  4/18/18


08:10


 


White Blood Count


  8.0 K/UL


(4.8-10.8)


 


Red Blood Count


  3.37 M/UL


(4.20-5.40)  L


 


Hemoglobin


  10.3 G/DL


(12.0-16.0)  L


 


Hematocrit


  31.8 %


(37.0-47.0)  L


 


Mean Corpuscular Volume 94 FL (80-99)  


 


Mean Corpuscular Hemoglobin


  30.5 PG


(27.0-31.0)


 


Mean Corpuscular Hemoglobin


Concent 32.3 G/DL


(32.0-36.0)


 


Red Cell Distribution Width


  15.0 %


(11.6-14.8)  H


 


Platelet Count


  260 K/UL


(150-450)


 


Mean Platelet Volume


  5.9 FL


(6.5-10.1)  L


 


Neutrophils (%) (Auto)


  69.0 %


(45.0-75.0)


 


Lymphocytes (%) (Auto)


  23.8 %


(20.0-45.0)


 


Monocytes (%) (Auto)


  4.3 %


(1.0-10.0)


 


Eosinophils (%) (Auto)


  1.8 %


(0.0-3.0)


 


Basophils (%) (Auto)


  1.1 %


(0.0-2.0)


 


Sodium Level


  136 MMOL/L


(136-145)


 


Potassium Level


  3.7 MMOL/L


(3.5-5.1)


 


Chloride Level


  102 MMOL/L


()


 


Carbon Dioxide Level


  26 MMOL/L


(21-32)


 


Anion Gap


  8 mmol/L


(5-15)


 


Blood Urea Nitrogen


  17 mg/dL


(7-18)


 


Creatinine


  1.0 MG/DL


(0.55-1.30)


 


Estimat Glomerular Filtration


Rate 57.6 mL/min


(>60)


 


Glucose Level


  240 MG/DL


()  H


 


Calcium Level


  7.9 MG/DL


(8.5-10.1)  L








Height (Feet):  5


Height (Inches):  7.00


Weight (Pounds):  302


Medications





Current Medications








 Medications


  (Trade)  Dose


 Ordered  Sig/Tapan


 Route


 PRN Reason  Start Time


 Stop Time Status Last Admin


Dose Admin


 


 Acetaminophen/


 Hydrocodone Bitart


  (Norco 10/325)  1 tab  Q4H  PRN


 ORAL


 Moderate Pain (Pain Scale 4-6)  4/17/18 18:00


 4/23/18 17:59   


 


 


 Albuterol Sulfate


  (Proventil)  2.5 mg  Q4H  PRN


 HHN


 Shortness of Breath  4/16/18 16:00


 4/21/18 15:59   


 


 


 Carisoprodol


  (Soma)  350 mg  TID  PRN


 ORAL


 Muscle Spasm  4/16/18 16:30


 5/16/18 15:59   


 


 


 Cyanocobalamin


  (Vitamin B-12)  1,000 mcg  DAILY


 ORAL


   4/17/18 09:00


 5/17/18 08:59  4/18/18 08:21


 


 


 Diphenhydramine


 HCl


  (Benadryl)  50 mg  Q8HR  PRN


 ORAL


 Itching  4/16/18 20:30


 5/16/18 20:29  4/16/18 21:10


 


 


 Duloxetine HCl


  (Cymbalta)  60 mg  Q12HR


 ORAL


   4/16/18 21:00


 5/16/18 20:59  4/18/18 08:22


 


 


 Heparin Sodium


  (Porcine)


  (Heparin 5000


 units/ml)  5,000 units  EVERY 8  HOURS


 SUBQ


   4/16/18 22:00


 5/16/18 21:59  4/18/18 14:28


 


 


 Hydromorphone HCl


  (Dilaudid)  1 mg  Q4H  PRN


 IVP


 Severe Pain (Pain Scale 7-10)  4/17/18 17:45


 4/24/18 17:44  4/18/18 16:33


 


 


 Lidocaine


  (Lidoderm 5%


 PATCH)  1 patch  DAILY


 TDERMAL


   4/18/18 09:45


 5/18/18 09:44  4/18/18 10:03


 


 


 Lorazepam


  (Ativan)  1 mg  Q8HR


 ORAL


   4/16/18 22:00


 4/23/18 21:59  4/18/18 14:24


 


 


 Ondansetron HCl


  (Zofran)  4 mg  Q6H  PRN


 ORAL


 Nausea & Vomiting  4/16/18 16:00


 5/16/18 15:59  4/17/18 16:46


 


 


 Pantoprazole


  (Protonix)  40 mg  DAILY


 ORAL


   4/17/18 09:00


 5/17/18 08:59  4/18/18 06:29


 


 


 Patient Own


 Medication


  (Patient's Own


 Med)  1 ea  QID


 TOPIC


   4/16/18 21:00


 5/16/18 20:59  4/18/18 17:48


 


 


 Propranolol HCl


  (Inderal)  10 mg  EVERY 6 HOURS  PRN


 ORAL


 palpitation  4/16/18 16:00


 5/16/18 15:59   


 


 


 Quetiapine


 Fumarate


  (SEROquel)  300 mg  QHS


 ORAL


   4/16/18 21:00


 5/16/18 20:59  4/17/18 21:32


 


 


 Solifenacin


  (Vesicare)  10 mg  DAILY


 ORAL


   4/19/18 09:00


 5/19/18 08:59   


 


 


 Solifenacin


  (Vesicare)  10 mg  ONCE


 ORAL


   4/18/18 21:00


 4/18/18 22:00   


 


 


 Trazodone HCl


  (Desyrel)  300 mg  BEDTIME


 ORAL


   4/18/18 21:00


 5/18/18 20:59   


 











Assessment/Plan


Status:  stable


Assessment/Plan


mdd


anxiety 





trazodone


Seroquel


Cassandra Meng M.D. Apr 18, 2018 18:20

## 2018-04-19 NOTE — INTERNAL MED PROGRESS NOTE
Subjective


Date of Service:  Apr 19, 2018


Physician Name


Eder Robb


Attending Physician


Medhat Vogt MD





Current Medications








 Medications


  (Trade)  Dose


 Ordered  Sig/Tapan


 Route


 PRN Reason  Start Time


 Stop Time Status Last Admin


Dose Admin


 


 Acetaminophen/


 Hydrocodone Bitart


  (Norco 10/325)  1 tab  Q4H  PRN


 ORAL


 Moderate Pain (Pain Scale 4-6)  4/17/18 18:00


 4/23/18 17:59   


 


 


 Albuterol Sulfate


  (Proventil)  2.5 mg  Q4H  PRN


 HHN


 Shortness of Breath  4/16/18 16:00


 4/21/18 15:59   


 


 


 Carisoprodol


  (Soma)  350 mg  TID  PRN


 ORAL


 Muscle Spasm  4/16/18 16:30


 5/16/18 15:59   


 


 


 Cyanocobalamin


  (Vitamin B-12)  1,000 mcg  DAILY


 ORAL


   4/17/18 09:00


 5/17/18 08:59  4/19/18 09:16


 


 


 Diphenhydramine


 HCl


  (Benadryl)  50 mg  Q8HR  PRN


 ORAL


 Itching  4/16/18 20:30


 5/16/18 20:29  4/16/18 21:10


 


 


 Duloxetine HCl


  (Cymbalta)  60 mg  DAILY


 ORAL


   4/20/18 09:00


 5/20/18 08:59   


 


 


 Heparin Sodium


  (Porcine)


  (Heparin 5000


 units/ml)  5,000 units  EVERY 8  HOURS


 SUBQ


   4/16/18 22:00


 5/16/18 21:59  4/19/18 13:56


 


 


 Hydromorphone HCl


  (Dilaudid)  1 mg  Q4H  PRN


 IVP


 Severe Pain (Pain Scale 7-10)  4/17/18 17:45


 4/24/18 17:44  4/19/18 16:19


 


 


 Lidocaine


  (Lidoderm 5%


 PATCH)  1 patch  DAILY


 TDERMAL


   4/18/18 09:45


 5/18/18 09:44  4/18/18 10:03


 


 


 Lorazepam


  (Ativan)  1 mg  Q8HR


 ORAL


   4/16/18 22:00


 4/23/18 21:59  4/19/18 13:55


 


 


 Ondansetron HCl


  (Zofran)  4 mg  Q6H  PRN


 ORAL


 Nausea & Vomiting  4/16/18 16:00


 5/16/18 15:59  4/19/18 11:32


 


 


 Pantoprazole


  (Protonix)  40 mg  DAILY


 ORAL


   4/17/18 09:00


 5/17/18 08:59  4/19/18 06:35


 


 


 Patient Own


 Medication


  (Patient's Own


 Med)  1 ea  QID


 TOPIC


   4/16/18 21:00


 5/16/18 20:59  4/19/18 09:16


 


 


 Propranolol HCl


  (Inderal)  10 mg  EVERY 6 HOURS  PRN


 ORAL


 palpitation  4/16/18 16:00


 5/16/18 15:59   


 


 


 Quetiapine


 Fumarate


  (SEROquel)  300 mg  QHS


 ORAL


   4/16/18 21:00


 5/16/18 20:59  4/18/18 21:12


 


 


 Solifenacin


  (Vesicare)  10 mg  DAILY


 ORAL


   4/19/18 09:00


 5/19/18 08:59  4/19/18 09:16


 


 


 Trazodone HCl


  (Desyrel)  300 mg  BEDTIME


 ORAL


   4/18/18 21:00


 5/18/18 20:59  4/18/18 21:12


 


 


 Zolpidem Tartrate


  (Ambien)  5 mg  HSPRN  PRN


 ORAL


 Insomnia  4/19/18 21:00


 4/26/18 20:59   


 








Allergies:  


Coded Allergies:  


     LAMOTRIGINE (Verified  Allergy, Mild, Hives, 2/2/13)


     PENICILLINS (Verified  Allergy, Mild, Hives, 2/2/13)


     ERYTHROMYCIN BASE (Unverified  Allergy, Unknown, Rash, 2/25/15)


     PREGABALIN (Verified  Allergy, Unknown, 2/25/15)


 SWELLING


     SERTRALINE (Verified  Allergy, Unknown, 4/16/18)


     TRAMADOL HCL (Verified  Allergy, Unknown, 2/25/15)


 VOMIT


ROS Limited/Unobtainable:  No


Constitutional:  Reports: no symptoms


HEENT:  Reports: no symptoms


Cardiovascular:  Reports: no symptoms


Respiratory:  Reports: no symptoms


Gastrointestinal/Abdominal:  Reports: no symptoms


Genitourinary:  Reports: no symptoms


Neurologic/Psychiatric:  Reports: no symptoms


Subjective


56 YO F admitted with left knee pain.  Cover for Int Med-Dr Vogt.





Objective





Last Vital Signs








  Date Time  Temp Pulse Resp B/P (MAP) Pulse Ox O2 Delivery O2 Flow Rate FiO2


 


4/19/18 16:00 98.2 71 20 136/73 97 Room Air  





 98.2       


 


4/19/18 08:15        21











Laboratory Tests








Test


  4/19/18


05:45


 


White Blood Count


  7.2 K/UL


(4.8-10.8)


 


Red Blood Count


  3.47 M/UL


(4.20-5.40)  L


 


Hemoglobin


  10.6 G/DL


(12.0-16.0)  L


 


Hematocrit


  32.5 %


(37.0-47.0)  L


 


Mean Corpuscular Volume 94 FL (80-99)  


 


Mean Corpuscular Hemoglobin


  30.6 PG


(27.0-31.0)


 


Mean Corpuscular Hemoglobin


Concent 32.6 G/DL


(32.0-36.0)


 


Red Cell Distribution Width


  14.9 %


(11.6-14.8)  H


 


Platelet Count


  288 K/UL


(150-450)


 


Mean Platelet Volume


  6.0 FL


(6.5-10.1)  L


 


Neutrophils (%) (Auto)


  61.6 %


(45.0-75.0)


 


Lymphocytes (%) (Auto)


  28.7 %


(20.0-45.0)


 


Monocytes (%) (Auto)


  6.7 %


(1.0-10.0)


 


Eosinophils (%) (Auto)


  2.0 %


(0.0-3.0)


 


Basophils (%) (Auto)


  1.0 %


(0.0-2.0)


 


Sodium Level


  135 MMOL/L


(136-145)  L


 


Potassium Level


  4.4 MMOL/L


(3.5-5.1)


 


Chloride Level


  103 MMOL/L


()


 


Carbon Dioxide Level


  30 MMOL/L


(21-32)


 


Anion Gap


  2 mmol/L


(5-15)  L


 


Blood Urea Nitrogen


  16 mg/dL


(7-18)


 


Creatinine


  1.1 MG/DL


(0.55-1.30)


 


Estimat Glomerular Filtration


Rate 51.6 mL/min


(>60)


 


Glucose Level


  89 MG/DL


()  #


 


Calcium Level


  8.4 MG/DL


(8.5-10.1)  L

















Intake and Output  


 


 4/18/18 4/19/18





 19:00 07:00


 


Intake Total 1200 ml 240 ml


 


Balance 1200 ml 240 ml


 


  


 


Intake Oral 1200 ml 240 ml


 


# Voids 5 3








Objective


General Appearance:  WD/WN, no apparent distress, alert, obese


EENT:  PERRL/EOMI, normal ENT inspection


Neck:  non-tender, normal alignment, supple


Cardiovascular:  normal peripheral pulses, normal rate, regular rhythm, no 

gallop/murmur, no JVD


Respiratory/Chest:  chest wall non-tender, lungs clear, normal breath sounds, 

no respiratory distress, no accessory muscle use


Abdomen:  normal bowel sounds, non tender, soft, no organomegaly, no mass


Extremities:  normal range of motion, non-tender


Neurologic:  CNs II-XII grossly normal, no motor/sensory deficits


Skin:  normal pigmentation, warm/dry





Assessment/Plan


Problem List:  


(1) DDD (degenerative disc disease)


(2) Left knee pain


Assessment & Plan:  X-ray = osteoarthritis.  Continue norco and dilaudid





(3) Hypertension


(4) Fibromyalgia


(5) Obesity (BMI 35.0-39.9 without comorbidity)


(6) Bipolar 1 disorder, depressed


Assessment & Plan:  See psych note.  Continue trazodone, cymbalta and seroquel





(7) Osteoarthritis


(8) Osteoarthritis of left knee


Assessment/Plan


Discharge planning:  [place] [facility] today











EDER ROBB Apr 19, 2018 18:00

## 2018-04-19 NOTE — GENERAL PROGRESS NOTE
Assessment/Plan


Assessment/Plan


(1) Morbid Obesity


(2) B/L knee pain


(3) B/L knee Osteoarthritis


(4) Lumbar DDD


(5) Lumbar Spondylosis


(6) Lumbar Radiculopathy


Patient will be continued on  Norco and Dilaudid.


One time dose of Dilaudid 0.5mgIV was given. 


We recommend patient to be seen by Orthopedic surgeon. 


D/w Dr. Klein and he concurred.





Subjective


Date patient seen:  Apr 19, 2018


Time patient seen:  12:00 - pm


Allergies:  


Coded Allergies:  


     LAMOTRIGINE (Verified  Allergy, Mild, Hives, 2/2/13)


     PENICILLINS (Verified  Allergy, Mild, Hives, 2/2/13)


     ERYTHROMYCIN BASE (Unverified  Allergy, Unknown, Rash, 2/25/15)


     PREGABALIN (Verified  Allergy, Unknown, 2/25/15)


 SWELLING


     SERTRALINE (Verified  Allergy, Unknown, 4/16/18)


     TRAMADOL HCL (Verified  Allergy, Unknown, 2/25/15)


 VOMIT


Subjective


REVIEW OF SYSTEMS:  Denies rash, fever, chills, sweating, dizziness,


drowsiness, blurred vision, sore throat, and change in weight.  No


shortness of breath or chest pain.  No nausea, vomiting, diarrhea, or


blood in the stool or urine.  No bowel or bladder incontinence.  No


dysuria.  She is complaining of B/L knee pain and low back pain.





SUBJECTIVE: Patient reports that her pain is at a moderate level


and has been tolerated on the Dilaudid and Norco as needed. 


Due to IV issues had not got full IV dosage on last injection.





Objective





Last 24 Hour Vital Signs








  Date Time  Temp Pulse Resp B/P (MAP) Pulse Ox O2 Delivery O2 Flow Rate FiO2


 


4/19/18 11:55 99.6 63 22 120/61 99 Room Air  





 99.6       


 


4/19/18 08:15  92 16   Room Air  21


 


4/19/18 08:00 98.1 94 21 112/48 97 Room Air  





 98.1       


 


4/19/18 04:00 98.0 63 20 110/76 96   





 98.0       


 


4/19/18 00:00 98.0 62 20 106/61 98   





 98.0       


 


4/18/18 20:00 98.2 69 20 102/70 97   





 98.2       


 


4/18/18 15:30  68 20 107/57 100 Room Air  


 


4/18/18 14:30  70 20 109/62 98 Room Air  


 


4/18/18 13:30  80 20 136/74 99 Room Air  

















Intake and Output  


 


 4/18/18 4/19/18





 19:00 07:00


 


Intake Total 1200 ml 240 ml


 


Balance 1200 ml 240 ml


 


  


 


Intake Oral 1200 ml 240 ml


 


# Voids 5 3








Laboratory Tests


4/19/18 05:45: 


White Blood Count 7.2, Red Blood Count 3.47L, Hemoglobin 10.6L, Hematocrit 32.5L

, Mean Corpuscular Volume 94, Mean Corpuscular Hemoglobin 30.6, Mean 

Corpuscular Hemoglobin Concent 32.6, Red Cell Distribution Width 14.9H, 

Platelet Count 288, Mean Platelet Volume 6.0L, Neutrophils (%) (Auto) 61.6, 

Lymphocytes (%) (Auto) 28.7, Monocytes (%) (Auto) 6.7, Eosinophils (%) (Auto) 

2.0, Basophils (%) (Auto) 1.0, Sodium Level 135L, Potassium Level 4.4, Chloride 

Level 103, Carbon Dioxide Level 30, Anion Gap 2L, Blood Urea Nitrogen 16, 

Creatinine 1.1, Estimat Glomerular Filtration Rate 51.6, Glucose Level 89#, 

Calcium Level 8.4L


Height (Feet):  5


Height (Inches):  7.00


Weight (Pounds):  302


Objective


GENERAL:  Alert, awake, and oriented x3.


HEENT:  PERRLA.


NECK:  Range of motion is full in all directions.  No tenderness to


paracervical muscles.  No adenopathy


LUNGS:  Decreased breath sounds bilaterally


HEART:  S1 and S2 regular.


ABDOMEN:  Obese.


BACK:  tenderness to paraspinal muscles.  


EXTREMITIES:  No cyanosis.  No clubbing.


NEURO: No Changes.











SUSY AGUILERA Apr 19, 2018 13:12

## 2018-04-19 NOTE — PULMONOLOGY PROGRESS NOTE
Assessment/Plan


Problems:  


(1) Left knee pain


(2) chronic dyspnea


(3) Hypertension


(4) Obesity (BMI 35.0-39.9 without comorbidity)


Assessment/Plan


awaiting Rheumatology consult, still not available


pain consult appreciated


monitor BP


symptomatic treatment


dvt prophyalxis


respiratory treatment


titrate fio2 to sat of 92%





consider discharging with outpatient f/u by rheumatology





Subjective


ROS Limited/Unobtainable:  No


Constitutional:  Reports: no symptoms


HEENT:  Repors: no symptoms


Respiratory:  Reports: no symptoms


Allergies:  


Coded Allergies:  


     LAMOTRIGINE (Verified  Allergy, Mild, Hives, 2/2/13)


     PENICILLINS (Verified  Allergy, Mild, Hives, 2/2/13)


     ERYTHROMYCIN BASE (Unverified  Allergy, Unknown, Rash, 2/25/15)


     PREGABALIN (Verified  Allergy, Unknown, 2/25/15)


 SWELLING


     SERTRALINE (Verified  Allergy, Unknown, 4/16/18)


     TRAMADOL HCL (Verified  Allergy, Unknown, 2/25/15)


 VOMIT





Objective





Last 24 Hour Vital Signs








  Date Time  Temp Pulse Resp B/P (MAP) Pulse Ox O2 Delivery O2 Flow Rate FiO2


 


4/19/18 13:55 99.6       


 


4/19/18 11:55 99.6 63 22 120/61 99 Room Air  





 99.6       


 


4/19/18 08:15  92 16   Room Air  21


 


4/19/18 08:00 98.1 94 21 112/48 97 Room Air  





 98.1       


 


4/19/18 04:00 98.0 63 20 110/76 96   





 98.0       


 


4/19/18 00:00 98.0 62 20 106/61 98   





 98.0       


 


4/18/18 20:00 98.2 69 20 102/70 97   





 98.2       

















Intake and Output  


 


 4/18/18 4/19/18





 19:00 07:00


 


Intake Total 1200 ml 240 ml


 


Balance 1200 ml 240 ml


 


  


 


Intake Oral 1200 ml 240 ml


 


# Voids 5 3








General Appearance:  WD/WN


HEENT:  normocephalic, atraumatic


Respiratory/Chest:  chest wall non-tender, lungs clear


Breasts:  no masses


Cardiovascular:  normal peripheral pulses


Abdomen:  normal bowel sounds


Genitourinary:  normal external genitalia


Extremities:  no cyanosis


Skin:  no lesions


Neurologic/Psychiatric:  CNs II-XII grossly normal


Lymphatic:  no neck adenopathy


Laboratory Tests


4/19/18 05:45: 


White Blood Count 7.2, Red Blood Count 3.47L, Hemoglobin 10.6L, Hematocrit 32.5L

, Mean Corpuscular Volume 94, Mean Corpuscular Hemoglobin 30.6, Mean 

Corpuscular Hemoglobin Concent 32.6, Red Cell Distribution Width 14.9H, 

Platelet Count 288, Mean Platelet Volume 6.0L, Neutrophils (%) (Auto) 61.6, 

Lymphocytes (%) (Auto) 28.7, Monocytes (%) (Auto) 6.7, Eosinophils (%) (Auto) 

2.0, Basophils (%) (Auto) 1.0, Sodium Level 135L, Potassium Level 4.4, Chloride 

Level 103, Carbon Dioxide Level 30, Anion Gap 2L, Blood Urea Nitrogen 16, 

Creatinine 1.1, Estimat Glomerular Filtration Rate 51.6, Glucose Level 89#, 

Calcium Level 8.4L





Current Medications








 Medications


  (Trade)  Dose


 Ordered  Sig/Tapan


 Route


 PRN Reason  Start Time


 Stop Time Status Last Admin


Dose Admin


 


 Acetaminophen/


 Hydrocodone Bitart


  (Norco 10/325)  1 tab  Q4H  PRN


 ORAL


 Moderate Pain (Pain Scale 4-6)  4/17/18 18:00


 4/23/18 17:59   


 


 


 Albuterol Sulfate


  (Proventil)  2.5 mg  Q4H  PRN


 HHN


 Shortness of Breath  4/16/18 16:00


 4/21/18 15:59   


 


 


 Carisoprodol


  (Soma)  350 mg  TID  PRN


 ORAL


 Muscle Spasm  4/16/18 16:30


 5/16/18 15:59   


 


 


 Cyanocobalamin


  (Vitamin B-12)  1,000 mcg  DAILY


 ORAL


   4/17/18 09:00


 5/17/18 08:59  4/19/18 09:16


 


 


 Diphenhydramine


 HCl


  (Benadryl)  50 mg  Q8HR  PRN


 ORAL


 Itching  4/16/18 20:30


 5/16/18 20:29  4/16/18 21:10


 


 


 Duloxetine HCl


  (Cymbalta)  60 mg  DAILY


 ORAL


   4/20/18 09:00


 5/20/18 08:59   


 


 


 Heparin Sodium


  (Porcine)


  (Heparin 5000


 units/ml)  5,000 units  EVERY 8  HOURS


 SUBQ


   4/16/18 22:00


 5/16/18 21:59  4/19/18 13:56


 


 


 Hydromorphone HCl


  (Dilaudid)  1 mg  Q4H  PRN


 IVP


 Severe Pain (Pain Scale 7-10)  4/17/18 17:45


 4/24/18 17:44  4/19/18 11:33


 


 


 Lidocaine


  (Lidoderm 5%


 PATCH)  1 patch  DAILY


 TDERMAL


   4/18/18 09:45


 5/18/18 09:44  4/18/18 10:03


 


 


 Lorazepam


  (Ativan)  1 mg  Q8HR


 ORAL


   4/16/18 22:00


 4/23/18 21:59  4/19/18 13:55


 


 


 Ondansetron HCl


  (Zofran)  4 mg  Q6H  PRN


 ORAL


 Nausea & Vomiting  4/16/18 16:00


 5/16/18 15:59  4/19/18 11:32


 


 


 Pantoprazole


  (Protonix)  40 mg  DAILY


 ORAL


   4/17/18 09:00


 5/17/18 08:59  4/19/18 06:35


 


 


 Patient Own


 Medication


  (Patient's Own


 Med)  1 ea  QID


 TOPIC


   4/16/18 21:00


 5/16/18 20:59  4/19/18 09:16


 


 


 Propranolol HCl


  (Inderal)  10 mg  EVERY 6 HOURS  PRN


 ORAL


 palpitation  4/16/18 16:00


 5/16/18 15:59   


 


 


 Quetiapine


 Fumarate


  (SEROquel)  300 mg  QHS


 ORAL


   4/16/18 21:00


 5/16/18 20:59  4/18/18 21:12


 


 


 Solifenacin


  (Vesicare)  10 mg  DAILY


 ORAL


   4/19/18 09:00


 5/19/18 08:59  4/19/18 09:16


 


 


 Trazodone HCl


  (Desyrel)  300 mg  BEDTIME


 ORAL


   4/18/18 21:00


 5/18/18 20:59  4/18/18 21:12


 


 


 Zolpidem Tartrate


  (Ambien)  5 mg  HSPRN  PRN


 ORAL


 Insomnia  4/19/18 21:00


 4/26/18 20:59   


 

















Nilda Arellano MD Apr 19, 2018 15:49

## 2018-04-19 NOTE — GENERAL PROGRESS NOTE
Assessment/Plan


Status:  stable


Assessment/Plan





mdd


anxiety 





trazodone


Seroquel


Cymbalta 60


start ambien





Subjective


Date patient seen:  Apr 19, 2018


Neurologic/Psychiatric:  Reports: anxiety, depressed - multiople complains, 

emotional problems


Allergies:  


Coded Allergies:  


     LAMOTRIGINE (Verified  Allergy, Mild, Hives, 2/2/13)


     PENICILLINS (Verified  Allergy, Mild, Hives, 2/2/13)


     ERYTHROMYCIN BASE (Unverified  Allergy, Unknown, Rash, 2/25/15)


     PREGABALIN (Verified  Allergy, Unknown, 2/25/15)


 SWELLING


     SERTRALINE (Verified  Allergy, Unknown, 4/16/18)


     TRAMADOL HCL (Verified  Allergy, Unknown, 2/25/15)


 VOMIT


Subjective


the pt fell on her knees. sleep not adequate not taking trazodone





Objective





Last 24 Hour Vital Signs








  Date Time  Temp Pulse Resp B/P (MAP) Pulse Ox O2 Delivery O2 Flow Rate FiO2


 


4/19/18 11:55 99.6 63 22 120/61 99 Room Air  





 99.6       


 


4/19/18 08:15  92 16   Room Air  21


 


4/19/18 08:00 98.1 94 21 112/48 97 Room Air  





 98.1       


 


4/19/18 04:00 98.0 63 20 110/76 96   





 98.0       


 


4/19/18 00:00 98.0 62 20 106/61 98   





 98.0       


 


4/18/18 20:00 98.2 69 20 102/70 97   





 98.2       


 


4/18/18 15:30  68 20 107/57 100 Room Air  


 


4/18/18 14:30  70 20 109/62 98 Room Air  

















Intake and Output  


 


 4/18/18 4/19/18





 19:00 07:00


 


Intake Total 1200 ml 240 ml


 


Balance 1200 ml 240 ml


 


  


 


Intake Oral 1200 ml 240 ml


 


# Voids 5 3








Laboratory Tests


4/19/18 05:45: 


White Blood Count 7.2, Red Blood Count 3.47L, Hemoglobin 10.6L, Hematocrit 32.5L

, Mean Corpuscular Volume 94, Mean Corpuscular Hemoglobin 30.6, Mean 

Corpuscular Hemoglobin Concent 32.6, Red Cell Distribution Width 14.9H, 

Platelet Count 288, Mean Platelet Volume 6.0L, Neutrophils (%) (Auto) 61.6, 

Lymphocytes (%) (Auto) 28.7, Monocytes (%) (Auto) 6.7, Eosinophils (%) (Auto) 

2.0, Basophils (%) (Auto) 1.0, Sodium Level 135L, Potassium Level 4.4, Chloride 

Level 103, Carbon Dioxide Level 30, Anion Gap 2L, Blood Urea Nitrogen 16, 

Creatinine 1.1, Estimat Glomerular Filtration Rate 51.6, Glucose Level 89#, 

Calcium Level 8.4L


Height (Feet):  5


Height (Inches):  7.00


Weight (Pounds):  302


General Appearance:  no apparent distress, alert


Neurologic:  oriented x 3, responsive, depressed affect











Cassandra Colon M.D. Apr 19, 2018 13:55

## 2018-04-20 NOTE — DISCHARGE SUMMARY
Discharge Summary


DATE OF ADMISSION: 04/16/2018


DATE OF DISCHARGE: 04/19/2018


CONSULTANTS: 


Dr. Cassandra Klein


BRIEF HOSPITAL COURSE:


Patient is a 57-year-old -American female, presented with chief 

complaint of left knee pain.  She felt her knees gave out.  She went to [facility] and was given an immobilizer for the left knee.  She stated that the pain 

and swelling on the knees have increased over the last couple of days.  She 

presented to [place] emergency room complaining of left knee pain.  She has 

medical history significant for hypertension, obesity, myalgia, degenerative 

disc disease of the spine, major depression and anxiety disorder.


On evaluation at ED, blood work showed hyponatremia, creatinine was 1.1, she 

was given IV hydration and Zofran.  She was admitted for intractable knee pain.

  She was given Norco and Dilaudid.  She was diagnosed with anxiety disorder 

and depression and was given Seroquel, Cymbalta, trazodone.  She was given 

physical therapy.  X-rays of bilateral knee showed degenerative changes with no 

evidence of acute bony trauma.


She was diagnosed to have major depressive disorder and anxiety disorder, she 

was given trazodone, Seroquel and Cymbalta.  She had insomnia and was started 

on Ambien.  She was then discharged back to nursing home.





FINAL DIAGNOSES: 


Osteoarthritis of bilateral knee


Degenerative disc disease


Obesity


Hypertension


Fibromyalgia


Bipolar 1 disorder


Major depressive disorder


Anxiety





DISPOSITION: Patient was discharged back to [facility]





I have been assigned to dictate discharge summary on this account, and I was 

not involved in the patient's management.











Maria Ines Mcgrath NP Apr 20, 2018 13:13

## 2018-07-26 NOTE — PRE-PROCEDURE NOTE/ATTESTATION
Pre-Procedure Note/Attestation


Complete Prior to Procedure


Planned Procedure:  left


Procedure Narrative:


knee arthroscopy possible menisectomy





Indications for Procedure


Pre-Operative Diagnosis:


left knee internal derangement





Attestation


I attest that I discussed the nature of the procedure; its benefits; risks and 

complications; and alternatives (and the risks and benefits of such alternatives

), prior to the procedure, with the patient (or the patient's legal 

representative).





I attest that, if there was a reasonable possibility of needing a blood 

transfusion, the patient (or the patient's legal representative) was given the 

[facility] standardized written summary, pursuant 

to the Antonio Koloa Blood Safety Act ([place] Health and Safety Code # 1645, as 

amended).





I attest that I re-evaluated the patient just prior to the surgery and that 

there has been no change in the patient's H&P, except as documented below:











Donn Fletcher MD Jul 26, 2018 06:56

## 2018-07-26 NOTE — 48 HOUR POST ANESTHESIA EVAL
Post Anesthesia Evaluation


Procedure:  Left knee arthroscopy with medial meniscectomy


Date of Evaluation:  Jul 26, 2018


Time of Evaluation:  08:45


Blood Pressure Systolic:  121


0:  55


Pulse Rate:  63


Respiratory Rate:  15


Temperature (Fahrenheit):  97.1


O2 Sat by Pulse Oximetry:  95


Airway:  patent


Nausea:  No


Vomiting:  No


Pain Intensity:  0


Hydration Status:  adequate


Cardiopulmonary Status:


at baseline


Mental Status/LOC:  patient returned to baseline


Post-Anesthesia Complications:


0


Follow-up care needed:  ready to discharge











Vanessa Tang MD Jul 26, 2018 08:22

## 2018-07-26 NOTE — ANETHESIA PREOPERATIVE EVAL
Anesthesia Pre-op PMH/ROS


General


Date of Evaluation:  Jul 26, 2018


Anesthesiologist:  [place]


ASA Score:  ASA 3


Mallampati Score


Class I : Soft palate, uvula, fauces, pillars visible


Class II: Soft palate, uvula, fauces visible


Class III: Soft palate, base of uvula visible


Class IV: Only hard plate visible


Mallampati Classification:  Class III


Surgeon:  Chance


Diagnosis:  Left knee internal derangement


Surgical Procedure:  Left knee arthroscopy


Anesthesia History:  none


Family History:  no anesthesia problems


Allergies:  


Coded Allergies:  


     LAMOTRIGINE (Verified  Allergy, Mild, Hives, 2/2/13)


     PENICILLINS (Verified  Allergy, Mild, Hives, 2/2/13)


     CODEINE (Verified  Allergy, Unknown, 7/26/18)


 ITCHING


     ERYTHROMYCIN BASE (Unverified  Allergy, Unknown, Rash, 2/25/15)


     PREGABALIN (Verified  Allergy, Unknown, 2/25/15)


 SWELLING


     SERTRALINE (Verified  Allergy, Unknown, 4/16/18)


     TRAMADOL HCL (Verified  Allergy, Unknown, 2/25/15)


 VOMIT


     ASPIRIN (Verified  Adverse Reaction, Severe, 7/26/18)


 NAUSEA AND VOMITING


Uncoded Allergies:  


     GABAPEN (Adverse Reaction, Intermediate, 7/26/18)


 PT SAID IT MAKES HER BOUNCE OFF WALL


Medications:  see eMAR





Past Medical History


Cardiovascular:  Reports: HTN; 


   Denies: CAD, MI, valve dz, arrhythmia, other


Pulmonary:  Denies: asthma, COPD, JONATAN, other


Gastrointestinal/Genitourinary:  Reports: GERD; 


   Denies: CRI, ESRD, other


Neurologic/Psychiatric:  Reports: depression/anxiety, TIA; 


   Denies: dementia, CVA, other


Endocrine:  Denies: DM, hypothyroidism, steroids, other


HEENT:  Reports: cataract (L), cataract (R); 


   Denies: glaucoma, [place] (L), [place] (R), other


Hematology/Immune:  Reports: anemia - chronic; 


   Denies: DVT, bleeding disorder, other


Musculoskeletal/Integumentary:  Reports: OA, DJD; 


   Denies: RA, DDD, edema, other


Other:  obesity - morbid


PSxH Narrative:


gastric bypass, partial hysterectomy, right knee arthroscopy





Anesthesia Pre-op Phys. Exam


Physician Exam





Last Vital Signs








  Date Time  Temp Pulse Resp B/P (MAP) Pulse Ox O2 Delivery O2 Flow Rate FiO2


 


7/26/18 06:08      Room Air  








Constitutional:  NAD


Cardiovascular:  RRR


Respiratory:  CTA, other - distant breath sounds, diminished bilaterally





Airway Exam


Mallampati Score:  Class III


MO:  limited


ROM:  limited


Teeth:  intact





Anesthesia Pre-op A/P


Labs


see chart





Studies


Pre-op Studies:  EKG - sr





Risk Assessment & Plan


Assessment:


ASA III


Plan:


GA


Status Change Before Surgery:  No





Pre-Antibiotics


Drug:  Clindamycin 900mg


Given Within 1 Hr of Incision:  Yes











Vanessa Tang MD Jul 26, 2018 06:42

## 2018-07-26 NOTE — IMMEDIATE POST-OP EVALUATION
Immediate Post-Op Evalulation


Immediate Post-Op Evalulation


Procedure:  Left knee arthroscopy with medial meniscectomy


Date of Evaluation:  Jul 26, 2018


Time of Evaluation:  08:24


IV Fluids:  700


Blood Products:  0


Estimated Blood Loss:  min


Urinary Output:  0


Blood Pressure Systolic:  116


Blood Pressure Diastolic:  63


Pulse Rate:  70


Respiratory Rate:  15


O2 Sat by Pulse Oximetry:  99


Temperature (Fahrenheit):  97.1


Pain Score (1-10):  0


Nausea:  No


Vomiting:  No


Complications


0


Patient Status:  awake, reacts, patent, none


Hydration Status:  adequate


Drug:  Clindamycin 600mg


Given Within 1 Hr of Incision:  Yes


Time Given:  07:25











Vanessa Tang MD Jul 26, 2018 08:22

## 2018-07-26 NOTE — OPERATIVE NOTE - PDOC
Operative Note


Operative Note


Pre-op Diagnosis:


left knee internal derangement


Procedure:


see op report


Post-op Diagnosis:  same as pre-op plus


Anesthesia:  general, regional


Specimen:  none


Complications:  none


Condition:  stable


Estimated Blood Loss:  none


Implant(s) used?:  No











Donn Fletcher MD Jul 26, 2018 06:56

## 2018-07-26 NOTE — OPERATIVE NOTE - DICTATED
DATE OF OPERATION:  07/26/2018



PREOPERATIVE DIAGNOSES:

1. Left knee intrameniscal, possible meniscal tear.

2. Left knee arthritis.



POSTOPERATIVE DIAGNOSES:

1. 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

2. Grade 3 to grade 4 damage, medial compartment.

3. Posterior horn medial meniscal tear.

4. Lateral femoral condyle, chondral damage.



PROCEDURES PERFORMED:

1. Left knee arthroscopic partial medial meniscectomy.

2. Synovectomy lateral patellofemoral compartment.

3. Gentle chondroplasty, lateral compartment.



SURGEON:  Donn Fletcher M.D.



ANESTHESIA:  General.



INDICATION FOR PROCEDURE:  The patient is a pleasant female who continued

to have mechanical symptoms, popping, catching, locking, and giving out.

She had MRI, which showed some intrameniscal degeneration, but no obvious

yee tear.  Given that she failed conservative treatment, elected to

undergo left knee diagnostic arthroscopy, possible meniscectomy,

synovectomy, chondroplasty based on intraoperative findings.  Risks,

limitations, expectations, and complications of procedure discussed in

detail.  All questions addressed.



DESCRIPTION OF PROCEDURE:  After informed consent was obtained, the patient

was brought to the operating room and placed  under general

anesthesia.  Tourniquet was applied to the left proximal thigh.  Left leg

was prepped and draped in a sterile manner.  Time-out was performed.



A 0.25% Marcaine plain injected in the left knee.  Portal sites were

injected with 1% lidocaine with epinephrine.  Esmarch was used to

exsanguinate the extremity.  Inferolateral stab incision was then made.

Trocar was introduced into the knee joint.  There is significant

hypertrophic synovial tissue retropatellar space.  Medial gutter was free

of any loose bodies.  Medial compartment was entered.  Grade 3 to grade 4

chondral damage in the tibial plateau as well as medial femoral condyle.

There was a small tear at the posterior horn medial meniscus.  Partial

meniscectomy along the middle body was performed down to stable rim of

meniscal tissue.  At this point, the synovectomy and excision of the

ligamentum mucosa was performed to better visualize the ACL and lateral

compartment.  The ACL was probed noted to be intact.  Lateral compartment

was entered.  There was actually some chondral damage in the lateral

tibial plateau.  There were also some chondral fragments in medial femoral

condyle, which were unstable.  The meniscus appeared to be intact.  Gentle

chondroplasty of the loose chondral fragments was performed.  Once that

was done, the camera was placed in the patellofemoral compartment and

excision of fat pad and the synovectomy was completed.  At which point,

grade 3 to grade 4 chondral damage in the trochlear groove was identified.

At this point, the instruments removed.  Portal sites were closed with

3-0 Monocryl sutures.  Steri-Strips and a sterile dressing were applied.

The patient was awoken and taken to recovery room with stable vital

signs.



ESTIMATED BLOOD LOSS:  None.



COMPLICATIONS:  None.



SPECIMENS:  None.









  ______________________________________________

  Donn Fletcher M.D.





DR:  ZHAO

D:  07/26/2018 08:10

T:  07/26/2018 16:19

JOB#:  [id number]

CC:



QAMAR

## 2018-07-28 NOTE — EMERGENCY ROOM REPORT
History of Present Illness


General


Chief Complaint:  Pain


Source:  Patient





Present Illness


HPI


Patient present with complaints of increased left knee pain she had


Arthroscopic partial medial meniscectomy on Thursday


Patient reports that she has been elevating it and putting ice she is also on 

pain management and reports that the Percocet is not significantly helping the 

pain





She reports that she has not been able to eat and appears dehydrated





Denies any chest pain or shortness of breath denies any back or flank pain


Pain is reported as 10 out of 10


Allergies:  


Coded Allergies:  


     LAMOTRIGINE (Verified  Allergy, Mild, Hives, 13)


     PENICILLINS (Verified  Allergy, Mild, Hives, 13)


     CODEINE (Verified  Allergy, Unknown, 18)


 ITCHING


     ERYTHROMYCIN BASE (Unverified  Allergy, Unknown, Rash, 2/25/15)


     PREGABALIN (Verified  Allergy, Unknown, 2/25/15)


 SWELLING


     SERTRALINE (Verified  Allergy, Unknown, 18)


     TRAMADOL HCL (Verified  Allergy, Unknown, 2/25/15)


 VOMIT


     ASPIRIN (Verified  Adverse Reaction, Severe, 18)


 NAUSEA AND VOMITING


     GABAPENTIN (Verified  Adverse Reaction, Intermediate, 18)





Patient History


Past Medical History:  see triage record


Pertinent Family History:  none


Last Menstrual Period:  none


Pregnant Now:  No


:  1


Para:  1


Reviewed Nursing Documentation:  PMH: Agreed; PSxH: Agreed





Nursing Documentation-PMH


Hx Cardiac Problems:  Yes


Hx Hypertension:  Yes


Hx Cancer:  No


Hx Gastrointestinal Problems:  Yes


Hx Neurological Problems:  Yes - Anxiety


Hx Transient Ischemic Attacks:  Yes


Hx Syncope:  Yes





Review of Systems


All Other Systems:  negative except mentioned in HPI





Physical Exam





Vital Signs








  Date Time  Temp Pulse Resp B/P (MAP) Pulse Ox O2 Delivery O2 Flow Rate FiO2


 


18 18:21 98.2 56 18 158/83 98 Room Air  





 98.2       








Sp02 EP Interpretation:  reviewed, normal


General Appearance:  no apparent distress


Head:  normocephalic, atraumatic


Eyes:  bilateral eye PERRL, bilateral eye EOMI


ENT:  hearing grossly normal, normal pharynx


Neck:  supple


Respiratory:  lungs clear


Cardiovascular #1:  regular rate, rhythm


Gastrointestinal:  non tender, soft


Musculoskeletal:  other - Subjectively uncomfortable in the left knee, patient 

has 2 areas of dressing lower aspect of the knee, no obvious erythema or 

swelling


Neurologic:  alert, oriented x3, responsive


Skin:  normal color, no rash


Lymphatic:  no adenopathy





Medical Decision Making


Diagnostic Impression:  


 Primary Impression:  


 Left knee pain


 Additional Impression:  


 Post-operative pain


ER Course


Multiple differentials were considered patient is complex with differentials 

including but not limited to infectious, joint sepsis, hematoma





Patient's clinical evaluation is fairly benign


The knee itself does not show any swelling or erythema





Patient appears of the having difficulty obtaining pain control





At this time she felt uncomfortable going home reports that she lives by 

herself is unable to care for herself





Is admitted for further pain control





Labs








Test


  18


18:59 18


19:43 18


06:30 18


09:20


 


Sodium Level


  135 MMOL/L


(136-145) 


  136 MMOL/L


(136-145) 135 MMOL/L


(136-145)


 


Potassium Level


  5.4 MMOL/L


(3.5-5.1) 


  4.3 MMOL/L


(3.5-5.1) 4.3 MMOL/L


(3.5-5.1)


 


Chloride Level


  103 MMOL/L


() 


  103 MMOL/L


() 102 MMOL/L


()


 


Carbon Dioxide Level


  23 MMOL/L


(21-32) 


  27 MMOL/L


(21-32) 27 MMOL/L


(21-32)


 


Anion Gap


  9 mmol/L


(5-15) 


  6 mmol/L


(5-15) 7 mmol/L


(5-15)


 


Blood Urea Nitrogen


  29 mg/dL


(7-18) 


  20 mg/dL


(7-18) 22 mg/dL


(7-18)


 


Creatinine


  1.1 MG/DL


(0.55-1.30) 


  1.0 MG/DL


(0.55-1.30) 1.2 MG/DL


(0.55-1.30)


 


Estimat Glomerular Filtration


Rate 51.6 mL/min


(>60) 


  57.6 mL/min


(>60) 46.7 mL/min


(>60)


 


Glucose Level


  103 MG/DL


() 


  99 MG/DL


() 95 MG/DL


()


 


Calcium Level


  8.2 MG/DL


(8.5-10.1) 


  8.1 MG/DL


(8.5-10.1) 8.2 MG/DL


(8.5-10.1)


 


White Blood Count


  


  8.2 K/UL


(4.8-10.8) 7.4 K/UL


(4.8-10.8) 9.5 K/UL


(4.8-10.8)


 


Red Blood Count


  


  3.73 M/UL


(4.20-5.40) 3.72 M/UL


(4.20-5.40) 3.83 M/UL


(4.20-5.40)


 


Hemoglobin


  


  10.3 G/DL


(12.0-16.0) 10.2 G/DL


(12.0-16.0) 10.7 G/DL


(12.0-16.0)


 


Hematocrit


  


  32.6 %


(37.0-47.0) 33.0 %


(37.0-47.0) 34.0 %


(37.0-47.0)


 


Mean Corpuscular Volume  88 FL (80-99)  89 FL (80-99)  89 FL (80-99) 


 


Mean Corpuscular Hemoglobin


  


  27.8 PG


(27.0-31.0) 27.4 PG


(27.0-31.0) 27.9 PG


(27.0-31.0)


 


Mean Corpuscular Hemoglobin


Concent 


  31.7 G/DL


(32.0-36.0) 30.9 G/DL


(32.0-36.0) 31.5 G/DL


(32.0-36.0)


 


Red Cell Distribution Width


  


  15.4 %


(11.6-14.8) 15.3 %


(11.6-14.8) 15.5 %


(11.6-14.8)


 


Platelet Count


  


  275 K/UL


(150-450) 261 K/UL


(150-450) 277 K/UL


(150-450)


 


Mean Platelet Volume


  


  6.0 FL


(6.5-10.1) 5.8 FL


(6.5-10.1) 6.0 FL


(6.5-10.1)


 


Neutrophils (%) (Auto)


  


  69.3 %


(45.0-75.0) 51.8 %


(45.0-75.0) 79.2 %


(45.0-75.0)


 


Lymphocytes (%) (Auto)


  


  23.2 %


(20.0-45.0) 37.6 %


(20.0-45.0) 11.7 %


(20.0-45.0)


 


Monocytes (%) (Auto)


  


  6.2 %


(1.0-10.0) 7.6 %


(1.0-10.0) 6.0 %


(1.0-10.0)


 


Eosinophils (%) (Auto)


  


  0.5 %


(0.0-3.0) 1.8 %


(0.0-3.0) 2.1 %


(0.0-3.0)


 


Basophils (%) (Auto)


  


  0.8 %


(0.0-2.0) 1.2 %


(0.0-2.0) 1.0 %


(0.0-2.0)











Last Vital Signs








  Date Time  Temp Pulse Resp B/P (MAP) Pulse Ox O2 Delivery O2 Flow Rate FiO2


 


18 18:32 98.2 52 18 158/83 98 Room Air  





 98.2       








Status:  improved


Disposition:  ADMITTED AS INPATIENT


Condition:  Serious











Davina Weber DO 2018 18:41

## 2018-07-29 NOTE — HISTORY AND PHYSICAL REPORT
DATE OF ADMISSION:  07/28/2018



CHIEF COMPLAINT:  This is a 57-year-old female presents with chief

complaint of left knee pain and swelling.



HISTORY OF PRESENT ILLNESS:  The patient has a history of left knee pain.

The patient is admitted to [facility] in April 2018.



The patient was evaluated by Dr. Jimmie Fletcher.  The patient underwent

arthroscopic meniscectomy of the left knee on Thursday, July 26, 2018.

The patient was discharged home.  The patient was unable to get around the

house at home.  The patient was now discharged with home health.



The patient presented to [facility] emergency room.  The patient was

complaining of intractable pain of the left knee.  The patient is admitted

for left knee pain and postoperative status.



REVIEW OF SYSTEMS:  CONSTITUTIONAL:  The patient denies weight loss or

weight gain.  The patient denies fevers or chills.  HEENT:  The patient

denies ear or throat pain.  The patient denies headache.  CARDIOVASCULAR:

The patient denies palpitations or chest pain.    CHEST:  The patient

denies wheeze or shortness of breath.  ABDOMEN:  The patient denies

nausea, vomiting, diarrhea, or constipation.  GENITOURINARY:  The patient

denies dysuria or increased frequency of urination.  NEUROMUSCULAR:  The

patient denies seizures or generalized weakness.  The patient does

complain of left knee pain as above.



PAST MEDICAL HISTORY:  Significant for:



1. Hypertension.

2. Obesity.

3. Fibromyalgia.

4. Degenerative disc disease of the spine.

5. Major depressive disorder.

6. Anxiety disorder.



PAST SURGICAL HISTORY:  Significant for:



1. Consuelo en Y gastric bypass surgery x3.

2. Total abdominal hysterectomy secondary to uterine fibroids.

3. Arthroscopic surgery of the right knee x2.

4. Right foot surgery on the fifth toe.

5. Left knee arthroscopic surgery on July 26, 2018.



CURRENT MEDICATIONS:

1. Cymbalta 30 mg p.o. at bedtime.

2. Cymbalta 60 mg p.o. every morning.

3. Nexium 40 mg p.o. twice daily.

4. Norco 10/325 mg one tablet p.o. q.6 h. p.r.n.

5. Lorazepam 1 mg p.o. q.8 hours. p.r.n.

6. Seroquel 300 mg p.o. at bedtime.

7. VESIcare 10 mg p.o. daily.

8. Trazodone 300 mg p.o. at bedtime.

9. Diovan 160 mg p.o. twice daily.

10. Ambien 10 mg p.o. at bedtime.



ALLERGIES:

1. Lamictal.

2. Penicillin.

3. Erythromycin.

4. Lyrica.

5. Tramadol.

6. Neurontin.

7. Zoloft.



SOCIAL HISTORY:  The patient is single and lives alone.  The patient is

disabled.  The patient denies tobacco or alcohol use.



PHYSICAL EXAMINATION:

VITAL SIGNS:  Temperature 97.7, respirations 18, pulse 56, and blood

pressure 120/72.

GENERAL:  The  patient is a well-developed and well-nourished obese 

American female, in no apparent distress.

HEENT:  Pupils equal and responsive to light and accommodation.

Extraocular movements are intact.

NECK:  Supple without lymphadenopathy.

CHEST:  Lungs are clear to auscultation bilaterally without wheezes or

rales.

CARDIOVASCULAR:  Regular rate.  S1, S2 normal without murmurs, rubs, or

gallops.

ABDOMEN:  Soft, nontender, and nondistended.  Positive bowel sounds.  No

evidence of hepatosplenomegaly.  Currently, no rebound or guarding

noted.

EXTREMITIES:  Negative for clubbing, cyanosis, or edema.  The left knee is

without swelling or erythema.

NEUROLOGIC:  Cranial nerves II through XII are grossly intact without focal

deficits.  Motor strength is 5/5 bilaterally.  Deep tendon reflexes are 2+

plantar.



LABORATORY STUDIES:  WBC 8.2, hemoglobin 10.3, hematocrit 32.6, and

platelets 275,000.  Sodium 135, potassium 5.4, chloride 103, CO2 is 23,

BUN 29, creatinine 1.1, and glucose 103.



ASSESSMENT:  This is a 57-year-old  female.



1. Left knee pain.

2. Left knee swelling.

3. Postoperative status.

4. Hypertension.

5. Obesity.

6. Fibromyalgia.

7. Degenerative disc disease of spine.

8. Major depression.

9. Anxiety disorder.



TREATMENT:

1. Left knee pain/left knee swelling/postoperative status.  An

Orthopedic consultation obtained with Dr. Jimmie Fletcher.  The patient is

unable to take care for herself at home.  The patient lives alone.  The

case management consultation has been obtained for placement at the

skilled nursing facility versus acute rehabilitation.

2. Hypertension.  Continue valsartan as above.

3. Obesity.  The patient is status post gastric bypass.

4. Fibromyalgia.  Continue Lyrica as above.

5. Degenerative disc disease of the spine.

6. Major depression/anxiety.  Continue Cymbalta as above.









  ______________________________________________

  Ludwin Fulton M.D.





DR:  VICENTE

D:  07/29/2018 16:08

T:  07/30/2018 05:08

JOB#:  [id number]

CC:

## 2018-07-29 NOTE — HISTORY & PHYSICAL
History and Physical


History & Physicial


Dictated for Int Med-Dr Vogt no. [id number]











Ludwin Fulton MD Jul 29, 2018 16:09

## 2018-07-30 NOTE — INTERNAL MED PROGRESS NOTE
Subjective


Date of Service:  Jul 30, 2018


Physician Name


Fulton,Ludwin


Attending Physician


Medhat Vogt MD





Current Medications








 Medications


  (Trade)  Dose


 Ordered  Sig/Tapan


 Route


 PRN Reason  Start Time


 Stop Time Status Last Admin


Dose Admin


 


 Diphenhydramine


 HCl


  (Benadryl)  25 mg  Q4H  PRN


 IVP


 Itching  7/30/18 11:00


 8/29/18 10:59   


 


 


 Duloxetine HCl


  (Cymbalta)  30 mg  BEDTIME


 ORAL


   7/29/18 21:00


 8/28/18 20:59  7/29/18 21:45


 


 


 Duloxetine HCl


  (Cymbalta)  60 mg  DAILY


 ORAL


   7/29/18 09:00


 8/28/18 08:59  7/30/18 08:12


 


 


 Heparin Sodium


  (Porcine)


  (Heparin 5000


 units/ml)  5,000 units  EVERY 8  HOURS


 SUBQ


   7/29/18 06:00


 8/28/18 05:59  7/30/18 06:05


 


 


 Hydromorphone HCl


  (Dilaudid)  1 mg  Q4H  PRN


 IVP


 Severe Pain (Pain Scale 7-10)  7/29/18 16:15


 8/5/18 16:14  7/30/18 10:07


 


 


 Irbesartan


  (Avapro)  150 mg  BID


 ORAL


   7/30/18 18:00


 8/29/18 17:59   


 


 


 Lorazepam


  (Ativan)  1 mg  Q8H  PRN


 ORAL


 For Anxiety  7/29/18 16:00


 8/5/18 15:59  7/30/18 08:13


 


 


 Ondansetron HCl


  (Zofran)  4 mg  EVERY 4 HOURS  PRN


 IVP


 Nausea & Vomiting  7/29/18 00:00


 8/28/18 00:00  7/30/18 06:14


 


 


 Oxycodone/


 Acetaminophen


  (Percocet 10/325)  1 tab  EVERY 6 HOURS  PRN


 ORAL


 mild to moderate pain  7/29/18 00:15


 8/5/18 00:14  7/30/18 08:13


 


 


 Solifenacin


  (Vesicare)  10 mg  DAILY


 ORAL


   7/29/18 09:00


 8/28/18 08:59  7/30/18 08:13


 


 


 Trazodone HCl


  (Desyrel)  300 mg  BEDTIME


 ORAL


   7/29/18 21:00


 8/28/18 20:59  7/29/18 21:45


 


 


 Zolpidem Tartrate


  (Ambien)  5 mg  BEDTIME  PRN


 ORAL


 Insomnia  7/29/18 00:15


 8/5/18 00:14  7/29/18 01:54


 








Allergies:  


Coded Allergies:  


     LAMOTRIGINE (Verified  Allergy, Mild, Hives, 2/2/13)


     PENICILLINS (Verified  Allergy, Mild, Hives, 2/2/13)


     CODEINE (Verified  Allergy, Unknown, 7/26/18)


 ITCHING


     ERYTHROMYCIN BASE (Unverified  Allergy, Unknown, Rash, 2/25/15)


     PREGABALIN (Verified  Allergy, Unknown, 2/25/15)


 SWELLING


     SERTRALINE (Verified  Allergy, Unknown, 4/16/18)


     TRAMADOL HCL (Verified  Allergy, Unknown, 2/25/15)


 VOMIT


     ASPIRIN (Verified  Adverse Reaction, Severe, 7/26/18)


 NAUSEA AND VOMITING


     GABAPENTIN (Verified  Adverse Reaction, Intermediate, 7/28/18)


ROS Limited/Unobtainable:  No


Constitutional:  Reports: no symptoms


HEENT:  Reports: no symptoms


Cardiovascular:  Reports: no symptoms


Respiratory:  Reports: no symptoms


Gastrointestinal/Abdominal:  Reports: no symptoms


Genitourinary:  Reports: no symptoms


Neurologic/Psychiatric:  Reports: no symptoms


Subjective


58 YO F admitted with chief complaint left knee pain.  Cover for Int Sergio-Dr Vogt.  Await ortho consult.





Objective





Last Vital Signs








  Date Time  Temp Pulse Resp B/P (MAP) Pulse Ox O2 Delivery O2 Flow Rate FiO2


 


7/30/18 08:07 98.0 72 18 119/49 (72) 95   





 98.0       


 


7/29/18 21:00      Room Air  








General Appearance:  WD/WN, no apparent distress, alert


EENT:  PERRL/EOMI, normal ENT inspection, TMs normal


Neck:  non-tender, normal alignment, supple


Cardiovascular:  normal peripheral pulses, normal rate


Respiratory/Chest:  chest wall non-tender, lungs clear, normal breath sounds, 

no respiratory distress, no accessory muscle use


Abdomen:  normal bowel sounds, non tender, soft, no organomegaly, no mass


Extremities:  normal range of motion, other - left knee pain


Neurologic:  CNs II-XII grossly normal, no motor/sensory deficits


Skin:  normal pigmentation, warm/dry





Laboratory Tests








Test


  7/30/18


06:30


 


White Blood Count


  7.4 K/UL


(4.8-10.8)


 


Red Blood Count


  3.72 M/UL


(4.20-5.40)  L


 


Hemoglobin


  10.2 G/DL


(12.0-16.0)  L


 


Hematocrit


  33.0 %


(37.0-47.0)  L


 


Mean Corpuscular Volume 89 FL (80-99)  


 


Mean Corpuscular Hemoglobin


  27.4 PG


(27.0-31.0)


 


Mean Corpuscular Hemoglobin


Concent 30.9 G/DL


(32.0-36.0)  L


 


Red Cell Distribution Width


  15.3 %


(11.6-14.8)  H


 


Platelet Count


  261 K/UL


(150-450)


 


Mean Platelet Volume


  5.8 FL


(6.5-10.1)  L


 


Neutrophils (%) (Auto)


  51.8 %


(45.0-75.0)


 


Lymphocytes (%) (Auto)


  37.6 %


(20.0-45.0)


 


Monocytes (%) (Auto)


  7.6 %


(1.0-10.0)


 


Eosinophils (%) (Auto)


  1.8 %


(0.0-3.0)


 


Basophils (%) (Auto)


  1.2 %


(0.0-2.0)


 


Sodium Level


  136 MMOL/L


(136-145)


 


Potassium Level


  4.3 MMOL/L


(3.5-5.1)


 


Chloride Level


  103 MMOL/L


()


 


Carbon Dioxide Level


  27 MMOL/L


(21-32)


 


Anion Gap


  6 mmol/L


(5-15)


 


Blood Urea Nitrogen


  20 mg/dL


(7-18)  H


 


Creatinine


  1.0 MG/DL


(0.55-1.30)


 


Estimat Glomerular Filtration


Rate 57.6 mL/min


(>60)


 


Glucose Level


  99 MG/DL


()


 


Calcium Level


  8.1 MG/DL


(8.5-10.1)  L

















Intake and Output  


 


 7/29/18 7/30/18





 19:00 07:00


 


Intake Total 840 ml 


 


Balance 840 ml 


 


  


 


Intake Oral 840 ml 


 


# Voids 2 2











Assessment/Plan


Problem List:  


(1) Pain, joint, knee, left


Assessment & Plan:  Await ortho consult





(2) Swelling of knee joint, left


(3) Post-op pain


(4) HTN (hypertension)


Assessment & Plan:  valsartan not formulary; start irbesartan





(5) Obesity


(6) Degenerative disc disease, lumbar


(7) Fibromyalgia


(8) Bipolar 1 disorder, depressed


Assessment & Plan:  Continue trazodone and cymbalta





Status:  stable











Ludwin Fulton MD Jul 30, 2018 11:11

## 2018-07-30 NOTE — CONSULTATION
History of Present Illness


General


Chief Complaint:  Pain





Present Illness


Allergies:  


Coded Allergies:  


     LAMOTRIGINE (Verified  Allergy, Mild, Hives, 2/2/13)


     PENICILLINS (Verified  Allergy, Mild, Hives, 2/2/13)


     CODEINE (Verified  Allergy, Unknown, 7/26/18)


 ITCHING


     ERYTHROMYCIN BASE (Unverified  Allergy, Unknown, Rash, 2/25/15)


     PREGABALIN (Verified  Allergy, Unknown, 2/25/15)


 SWELLING


     SERTRALINE (Verified  Allergy, Unknown, 4/16/18)


     TRAMADOL HCL (Verified  Allergy, Unknown, 2/25/15)


 VOMIT


     ASPIRIN (Verified  Adverse Reaction, Severe, 7/26/18)


 NAUSEA AND VOMITING


     GABAPENTIN (Verified  Adverse Reaction, Intermediate, 7/28/18)





Medication History


Scheduled


Duloxetine Hcl* (Cymbalta*), 30 MG PO BEDTIME, (Reported)


Duloxetine Hcl* (Cymbalta*), 60 MG PO DAILY, (Reported)


Esomeprazole Magnesium (Nexium), 80 MG ORAL DAILY, (Reported)


Quetiapine Fumarate (Seroquel), 300 MG ORAL HS, (Reported)


Solifenacin Succinate (Vesicare*), 10 MG ORAL DAILY, (Reported)


Trazodone* (Trazodone*), 300 MG ORAL BEDTIME, (Reported)


Valsartan (Diovan), 160 MG ORAL BID, (Reported)





Scheduled PRN


Hydrocodone/Acetaminophen (Hydrocodon-Acetaminophn ), 1 TAB ORAL Q6HR PRN 

for For Pain, (Reported)


Lorazepam* (Ativan*), 1 MG ORAL Q8HR PRN for For Pain, (Reported)


Zolpidem Tartrate* (Ambien*), 10 MG ORAL HS PRN for Insomnia, (Reported)





Discontinued Medications


Albuterol Sulfate* (Albuterol Sulfate Hhn*), 3 ML INH Q4H PRN for Shortness of 

Breath, (Reported)


   Discontinued Reason: Pt stopped taking med


Buspirone Hcl* (Buspar*), 30 MG ORAL BID, (Reported)


   Discontinued Reason: Pt stopped taking med


Carisoprodol* (Soma*), 350 MG PO TID PRN for For Pain, (Reported)


   Discontinued Reason: Pt stopped taking med


Cyanocobalamin (Vitamin B-12) (Vitamin B-12), 1,000 MCG PO DAILY, (Reported)


   Discontinued Reason: Pt stopped taking med


Gabapentin* (Neurontin*), 1,200 MG ORAL TID, (Reported)


   Discontinued Reason: Pt had allergic rxn


Ondansetron* (Zofran*), 4 MG ORAL Q6H PRN for Nausea & Vomiting, (Reported)


   Discontinued Reason: Pt stopped taking med


Propranolol Hcl* (Inderal*), 10 MG ORAL EVERY 6 HOURS PRN for palpitation, (

Reported)


   Discontinued Reason: Pt stopped taking med


Quetiapine Fumarate* (Seroquel*), 100 MG PO DAILY, (Reported)


   Discontinued Reason: Pt stopped taking med


Trazodone* (Trazodone*), 100 MG ORAL DAILY, (Reported)


   Discontinued Reason: Pt stopped taking med





Patient History


Healthcare decision maker





Resuscitation status





Advanced Directive on File


Yes





Physical Exam





Last 24 Hour Vital Signs








  Date Time  Temp Pulse Resp B/P (MAP) Pulse Ox O2 Delivery O2 Flow Rate FiO2


 


7/30/18 11:43 98.1 58 18 107/66 (80) 97   





 98.1       


 


7/30/18 09:00      Room Air  


 


7/30/18 08:07 98.0 72 18 119/49 (72) 95   





 98.0       


 


7/30/18 04:00 98.5 57 16 102/56 (71) 94   





 98.5       


 


7/30/18 00:38 98.2 57 20 94/49 (64) 97   





 98.2       


 


7/29/18 21:00      Room Air  


 


7/29/18 19:59 98.4 46 20 110/54 (72) 98   





 98.4       


 


7/29/18 16:04 98.1 56 20 122/64 (83) 98   





 98.1       

















Intake and Output  


 


 7/29/18 7/30/18





 19:00 07:00


 


Intake Total 840 ml 


 


Balance 840 ml 


 


  


 


Intake Oral 840 ml 


 


# Voids 2 2











Laboratory Tests








Test


  7/30/18


06:30


 


White Blood Count


  7.4 K/UL


(4.8-10.8)


 


Red Blood Count


  3.72 M/UL


(4.20-5.40)  L


 


Hemoglobin


  10.2 G/DL


(12.0-16.0)  L


 


Hematocrit


  33.0 %


(37.0-47.0)  L


 


Mean Corpuscular Volume 89 FL (80-99)  


 


Mean Corpuscular Hemoglobin


  27.4 PG


(27.0-31.0)


 


Mean Corpuscular Hemoglobin


Concent 30.9 G/DL


(32.0-36.0)  L


 


Red Cell Distribution Width


  15.3 %


(11.6-14.8)  H


 


Platelet Count


  261 K/UL


(150-450)


 


Mean Platelet Volume


  5.8 FL


(6.5-10.1)  L


 


Neutrophils (%) (Auto)


  51.8 %


(45.0-75.0)


 


Lymphocytes (%) (Auto)


  37.6 %


(20.0-45.0)


 


Monocytes (%) (Auto)


  7.6 %


(1.0-10.0)


 


Eosinophils (%) (Auto)


  1.8 %


(0.0-3.0)


 


Basophils (%) (Auto)


  1.2 %


(0.0-2.0)


 


Sodium Level


  136 MMOL/L


(136-145)


 


Potassium Level


  4.3 MMOL/L


(3.5-5.1)


 


Chloride Level


  103 MMOL/L


()


 


Carbon Dioxide Level


  27 MMOL/L


(21-32)


 


Anion Gap


  6 mmol/L


(5-15)


 


Blood Urea Nitrogen


  20 mg/dL


(7-18)  H


 


Creatinine


  1.0 MG/DL


(0.55-1.30)


 


Estimat Glomerular Filtration


Rate 57.6 mL/min


(>60)


 


Glucose Level


  99 MG/DL


()


 


Calcium Level


  8.1 MG/DL


(8.5-10.1)  L








Height (Feet):  5


Height (Inches):  7.00


Weight (Pounds):  317


Medications





Current Medications








 Medications


  (Trade)  Dose


 Ordered  Sig/Tapan


 Route


 PRN Reason  Start Time


 Stop Time Status Last Admin


Dose Admin


 


 Diphenhydramine


 HCl


  (Benadryl)  25 mg  Q4H  PRN


 IVP


 Itching  7/30/18 11:00


 8/29/18 10:59  7/30/18 13:11


 


 


 Duloxetine HCl


  (Cymbalta)  30 mg  BEDTIME


 ORAL


   7/29/18 21:00


 8/28/18 20:59  7/29/18 21:45


 


 


 Duloxetine HCl


  (Cymbalta)  60 mg  DAILY


 ORAL


   7/29/18 09:00


 8/28/18 08:59  7/30/18 08:12


 


 


 Heparin Sodium


  (Porcine)


  (Heparin 5000


 units/ml)  5,000 units  EVERY 8  HOURS


 SUBQ


   7/29/18 06:00


 8/28/18 05:59  7/30/18 13:15


 


 


 Hydromorphone HCl


  (Dilaudid)  1 mg  Q4H  PRN


 IVP


 Severe Pain (Pain Scale 7-10)  7/29/18 16:15


 8/5/18 16:14  7/30/18 14:33


 


 


 Irbesartan


  (Avapro)  150 mg  BID


 ORAL


   7/30/18 18:00


 8/29/18 17:59   


 


 


 Lorazepam


  (Ativan)  1 mg  Q8H  PRN


 ORAL


 For Anxiety  7/29/18 16:00


 8/5/18 15:59  7/30/18 08:13


 


 


 Ondansetron HCl


  (Zofran)  4 mg  EVERY 4 HOURS  PRN


 IVP


 Nausea & Vomiting  7/29/18 00:00


 8/28/18 00:00  7/30/18 06:14


 


 


 Oxycodone/


 Acetaminophen


  (Percocet 10/325)  1 tab  EVERY 6 HOURS  PRN


 ORAL


 mild to moderate pain  7/29/18 00:15


 8/5/18 00:14  7/30/18 08:13


 


 


 Solifenacin


  (Vesicare)  10 mg  DAILY


 ORAL


   7/29/18 09:00


 8/28/18 08:59  7/30/18 08:13


 


 


 Trazodone HCl


  (Desyrel)  300 mg  BEDTIME


 ORAL


   7/29/18 21:00


 8/28/18 20:59  7/29/18 21:45


 


 


 Zolpidem Tartrate


  (Ambien)  5 mg  BEDTIME  PRN


 ORAL


 Insomnia  7/29/18 00:15


 8/5/18 00:14  7/29/18 01:54


 

















Nilda Arellano MD Jul 30, 2018 15:39

## 2018-07-31 NOTE — CONSULTATION
History of Present Illness


General


Date patient seen:  Jul 30, 2018


Chief Complaint:  Pain





Present Illness


HPI


57-year-old female presents with chief complaint of left knee pain and 

swelling. The pt has depressed mood and anxiety. the pt has been on multiple  

medications.


Allergies:  


Coded Allergies:  


     LAMOTRIGINE (Verified  Allergy, Mild, Hives, 2/2/13)


     PENICILLINS (Verified  Allergy, Mild, Hives, 2/2/13)


     CODEINE (Verified  Allergy, Unknown, 7/26/18)


 ITCHING


     ERYTHROMYCIN BASE (Unverified  Allergy, Unknown, Rash, 2/25/15)


     PREGABALIN (Verified  Allergy, Unknown, 2/25/15)


 SWELLING


     SERTRALINE (Verified  Allergy, Unknown, 4/16/18)


     TRAMADOL HCL (Verified  Allergy, Unknown, 2/25/15)


 VOMIT


     ASPIRIN (Verified  Adverse Reaction, Severe, 7/26/18)


 NAUSEA AND VOMITING


     GABAPENTIN (Verified  Adverse Reaction, Intermediate, 7/28/18)





Medication History


Scheduled


Duloxetine Hcl* (Cymbalta*), 30 MG PO BEDTIME, (Reported)


Duloxetine Hcl* (Cymbalta*), 60 MG PO DAILY, (Reported)


Esomeprazole Magnesium (Nexium), 80 MG ORAL DAILY, (Reported)


Irbesartan* (Avapro*), 150 MG ORAL DAILY, (Reported)


Quetiapine Fumarate (Seroquel), 300 MG ORAL HS, (Reported)


Solifenacin Succinate (Vesicare*), 10 MG ORAL DAILY, (Reported)


Trazodone* (Trazodone*), 300 MG ORAL BEDTIME, (Reported)


Valsartan (Diovan), 160 MG ORAL BID, (Reported)





Scheduled PRN


Acetaminophen* (Acetaminophen 325MG Tablet*), 650 MG ORAL Q4H PRN for Fever/

Headache/Mild Pain, (Reported)


Diphenhydramine Hcl* (Benadryl*), 25 MG ORAL Q4HR PRN for Itching, (Reported)


Hydrocodone/Acetaminophen (Hydrocodon-Acetaminophn ), 1 TAB ORAL Q6HR PRN 

for For Pain, (Reported)


Lorazepam* (Ativan*), 1 MG ORAL Q8HR PRN for For Pain, (Reported)


Ondansetron* (Zofran*), 4 MG ORAL Q4HR PRN for Nausea & Vomiting, (Reported)


Oxycodone Hcl/Acetaminophen  Mg Tablet (Percocet  Mg Tablet*), 1 

TAB ORAL Q6H PRN for For Pain, (Reported)


Zolpidem Tartrate* (Ambien*), 10 MG ORAL HS PRN for Insomnia, (Reported)


Zolpidem Tartrate* (Ambien*), 5 MG ORAL BEDTIME PRN for Insomnia, (Reported)





Discontinued Medications


Albuterol Sulfate* (Albuterol Sulfate Hhn*), 3 ML INH Q4H PRN for Shortness of 

Breath, (Reported)


   Discontinued Reason: Pt stopped taking med


Buspirone Hcl* (Buspar*), 30 MG ORAL BID, (Reported)


   Discontinued Reason: Pt stopped taking med


Carisoprodol* (Soma*), 350 MG PO TID PRN for For Pain, (Reported)


   Discontinued Reason: Pt stopped taking med


Cyanocobalamin (Vitamin B-12) (Vitamin B-12), 1,000 MCG PO DAILY, (Reported)


   Discontinued Reason: Pt stopped taking med


Gabapentin* (Neurontin*), 1,200 MG ORAL TID, (Reported)


   Discontinued Reason: Pt had allergic rxn


Ondansetron* (Zofran*), 4 MG ORAL Q6H PRN for Nausea & Vomiting, (Reported)


   Discontinued Reason: Pt stopped taking med


Propranolol Hcl* (Inderal*), 10 MG ORAL EVERY 6 HOURS PRN for palpitation, (

Reported)


   Discontinued Reason: Pt stopped taking med


Quetiapine Fumarate* (Seroquel*), 100 MG PO DAILY, (Reported)


   Discontinued Reason: Pt stopped taking med


Trazodone* (Trazodone*), 100 MG ORAL DAILY, (Reported)


   Discontinued Reason: Pt stopped taking med





Patient History


Limited by:  medical condition


History Provided By:  Patient, Medical Record, PMD


Healthcare decision maker





Resuscitation status





Advanced Directive on File


Yes





Past Medical/Surgical History


Past Medical/Surgical History:  


(1) Hypertension


(2) Obesity (BMI 35.0-39.9 without comorbidity)


(3) chronic dyspnea


(4) DDD (degenerative disc disease)


(5) Osteoarthritis


(6) Osteoarthritis of left knee


(7) Dehydration


(8) Palpitations


(9) Diarrhea


(10) Diarrhea


(11) Weakness


(12) Nausea


(13) Osteomyelitis


(14) Acute coronary syndrome


(15) Methamphetamine abuse


(16) Ankle edema


(17) Right knee pain


(18) Chest pain of uncertain etiology


(19) Intractable neuropathic pain of left knee


(20) Fibromyalgia


(21) Obesity


(22) HTN (hypertension)


(23) Post-op pain


(24) Degenerative disc disease, lumbar


(25) Swelling of knee joint, left


(26) Pain, joint, knee, left


(27) Bipolar 1 disorder, depressed


(28) Post-operative pain


(29) Left knee pain





Review of Systems


Psychiatric:  Reports: prior hx, anxiety, depressed feelings, emotional problems





Physical Exam


General Appearance:  no apparent distress, alert


Neurologic:  oriented x 3, responsive, depressed affect





Last 24 Hour Vital Signs








  Date Time  Temp Pulse Resp B/P (MAP) Pulse Ox O2 Delivery O2 Flow Rate FiO2


 


7/31/18 14:35 98.8       


 


7/31/18 14:35 99.7       


 


7/31/18 12:15 98.8       


 


7/31/18 12:00 99.7 66 19 115/51 (72) 97   





 99.7       


 


7/31/18 11:45 98.8       


 


7/31/18 09:58      Room Air  


 


7/31/18 08:03 98.8 82 20 109/52 (71) 96   





 98.8       


 


7/31/18 07:45 98.7       


 


7/31/18 07:42    109/52    


 


7/31/18 04:52 98.7 72 20 97/44 (61) 97   





 98.7       


 


7/31/18 03:22 97.2       


 


7/31/18 00:14 97.2 63 20 108/48 (68) 94   





 97.2       


 


7/30/18 23:04 98.2       


 


7/30/18 21:25      Room Air  


 


7/30/18 20:07 98.2 56 20 124/58 (80) 94   





 98.2       


 


7/30/18 17:07    143/69    


 


7/30/18 16:04 97.7 58 18 143/69 (93) 99   





 97.7       

















Intake and Output  


 


 7/30/18 7/31/18





 19:00 07:00


 


Intake Total 1320 ml 


 


Balance 1320 ml 


 


  


 


Intake Oral 1320 ml 


 


# Voids 6 3











Laboratory Tests








Test


  7/31/18


09:20


 


White Blood Count


  9.5 K/UL


(4.8-10.8)


 


Red Blood Count


  3.83 M/UL


(4.20-5.40)  L


 


Hemoglobin


  10.7 G/DL


(12.0-16.0)  L


 


Hematocrit


  34.0 %


(37.0-47.0)  L


 


Mean Corpuscular Volume 89 FL (80-99)  


 


Mean Corpuscular Hemoglobin


  27.9 PG


(27.0-31.0)


 


Mean Corpuscular Hemoglobin


Concent 31.5 G/DL


(32.0-36.0)  L


 


Red Cell Distribution Width


  15.5 %


(11.6-14.8)  H


 


Platelet Count


  277 K/UL


(150-450)


 


Mean Platelet Volume


  6.0 FL


(6.5-10.1)  L


 


Neutrophils (%) (Auto)


  79.2 %


(45.0-75.0)  H


 


Lymphocytes (%) (Auto)


  11.7 %


(20.0-45.0)  L


 


Monocytes (%) (Auto)


  6.0 %


(1.0-10.0)


 


Eosinophils (%) (Auto)


  2.1 %


(0.0-3.0)


 


Basophils (%) (Auto)


  1.0 %


(0.0-2.0)


 


Sodium Level


  135 MMOL/L


(136-145)  L


 


Potassium Level


  4.3 MMOL/L


(3.5-5.1)


 


Chloride Level


  102 MMOL/L


()


 


Carbon Dioxide Level


  27 MMOL/L


(21-32)


 


Anion Gap


  7 mmol/L


(5-15)


 


Blood Urea Nitrogen


  22 mg/dL


(7-18)  H


 


Creatinine


  1.2 MG/DL


(0.55-1.30)


 


Estimat Glomerular Filtration


Rate 46.7 mL/min


(>60)


 


Glucose Level


  95 MG/DL


()


 


Calcium Level


  8.2 MG/DL


(8.5-10.1)  L








Height (Feet):  5


Height (Inches):  7.00


Weight (Pounds):  317


Medications





Current Medications








 Medications


  (Trade)  Dose


 Ordered  Sig/Tapan


 Route


 PRN Reason  Start Time


 Stop Time Status Last Admin


Dose Admin


 


 Acetaminophen


  (Tylenol)  650 mg  Q4H  PRN


 ORAL


 Mild Pain/Temp > 100.5  7/31/18 14:30


 8/30/18 14:29  7/31/18 14:35


 


 


 Diphenhydramine


 HCl


  (Benadryl)  25 mg  Q4H  PRN


 IVP


 Itching  7/30/18 11:00


 8/29/18 10:59  7/31/18 13:05


 


 


 Duloxetine HCl


  (Cymbalta)  30 mg  BEDTIME


 ORAL


   7/29/18 21:00


 8/28/18 20:59  7/30/18 21:01


 


 


 Duloxetine HCl


  (Cymbalta)  60 mg  DAILY


 ORAL


   7/29/18 09:00


 8/28/18 08:59  7/31/18 07:43


 


 


 Heparin Sodium


  (Porcine)


  (Heparin 5000


 units/ml)  5,000 units  EVERY 8  HOURS


 SUBQ


   7/29/18 06:00


 8/28/18 05:59  7/31/18 13:12


 


 


 Hydromorphone HCl


  (Dilaudid)  1 mg  ONCE


 IVP


   7/31/18 14:30


 7/31/18 15:30  7/31/18 14:35


 


 


 Hydromorphone HCl


  (Dilaudid)  1 mg  Q4H  PRN


 IVP


 Severe Pain (Pain Scale 7-10)  7/29/18 16:15


 8/5/18 16:14  7/31/18 11:45


 


 


 Irbesartan


  (Avapro)  150 mg  BID


 ORAL


   7/30/18 18:00


 8/29/18 17:59  7/30/18 17:07


 


 


 Lorazepam


  (Ativan)  1 mg  Q8H  PRN


 ORAL


 For Anxiety  7/29/18 16:00


 8/5/18 15:59  7/30/18 17:07


 


 


 Ondansetron HCl


  (Zofran)  4 mg  EVERY 4 HOURS  PRN


 IVP


 Nausea & Vomiting  7/29/18 00:00


 8/28/18 00:00  7/31/18 13:05


 


 


 Oxycodone/


 Acetaminophen


  (Percocet 10/325)  1 tab  EVERY 6 HOURS  PRN


 ORAL


 Moderate Pain (Pain Scale 4-6)  7/31/18 14:30


 8/5/18 00:14   


 


 


 Solifenacin


  (Vesicare)  10 mg  DAILY


 ORAL


   7/29/18 09:00


 8/28/18 08:59  7/31/18 07:44


 


 


 Trazodone HCl


  (Desyrel)  300 mg  BEDTIME


 ORAL


   7/29/18 21:00


 8/28/18 20:59  7/30/18 21:01


 


 


 Zolpidem Tartrate


  (Ambien)  5 mg  BEDTIME  PRN


 ORAL


 Insomnia  7/29/18 00:15


 8/5/18 00:14  7/30/18 23:03


 











Assessment/Plan


Status:  stable


Assessment/Plan


Bipolar d/o


Anxiety d/o





-cont current meds


-Seroquel


-trazoCassandra Valdes MD Jul 31, 2018 15:10

## 2018-07-31 NOTE — GENERAL PROGRESS NOTE
Assessment/Plan


Assessment/Plan


Bipolar d/o


Anxiety d/o





-cont home meds


-Seroquel


-trazodone





Subjective


Date patient seen:  Jul 31, 2018


Neurologic/Psychiatric:  Reports: anxiety, depressed, emotional problems


Allergies:  


Coded Allergies:  


     LAMOTRIGINE (Verified  Allergy, Mild, Hives, 2/2/13)


     PENICILLINS (Verified  Allergy, Mild, Hives, 2/2/13)


     CODEINE (Verified  Allergy, Unknown, 7/26/18)


 ITCHING


     ERYTHROMYCIN BASE (Unverified  Allergy, Unknown, Rash, 2/25/15)


     PREGABALIN (Verified  Allergy, Unknown, 2/25/15)


 SWELLING


     SERTRALINE (Verified  Allergy, Unknown, 4/16/18)


     TRAMADOL HCL (Verified  Allergy, Unknown, 2/25/15)


 VOMIT


     ASPIRIN (Verified  Adverse Reaction, Severe, 7/26/18)


 NAUSEA AND VOMITING


     GABAPENTIN (Verified  Adverse Reaction, Intermediate, 7/28/18)





Objective





Last 24 Hour Vital Signs








  Date Time  Temp Pulse Resp B/P (MAP) Pulse Ox O2 Delivery O2 Flow Rate FiO2


 


7/31/18 14:35 98.8       


 


7/31/18 14:35 99.7       


 


7/31/18 12:15 98.8       


 


7/31/18 12:00 99.7 66 19 115/51 (72) 97   





 99.7       


 


7/31/18 11:45 98.8       


 


7/31/18 09:58      Room Air  


 


7/31/18 08:03 98.8 82 20 109/52 (71) 96   





 98.8       


 


7/31/18 07:45 98.7       


 


7/31/18 07:42    109/52    


 


7/31/18 04:52 98.7 72 20 97/44 (61) 97   





 98.7       


 


7/31/18 03:22 97.2       


 


7/31/18 00:14 97.2 63 20 108/48 (68) 94   





 97.2       


 


7/30/18 23:04 98.2       


 


7/30/18 21:25      Room Air  


 


7/30/18 20:07 98.2 56 20 124/58 (80) 94   





 98.2       


 


7/30/18 17:07    143/69    


 


7/30/18 16:04 97.7 58 18 143/69 (93) 99   





 97.7       

















Intake and Output  


 


 7/30/18 7/31/18





 19:00 07:00


 


Intake Total 1320 ml 


 


Balance 1320 ml 


 


  


 


Intake Oral 1320 ml 


 


# Voids 6 3








Laboratory Tests


7/31/18 09:20: 


White Blood Count 9.5, Red Blood Count 3.83L, Hemoglobin 10.7L, Hematocrit 34.0L

, Mean Corpuscular Volume 89, Mean Corpuscular Hemoglobin 27.9, Mean 

Corpuscular Hemoglobin Concent 31.5L, Red Cell Distribution Width 15.5H, 

Platelet Count 277, Mean Platelet Volume 6.0L, Neutrophils (%) (Auto) 79.2H, 

Lymphocytes (%) (Auto) 11.7L, Monocytes (%) (Auto) 6.0, Eosinophils (%) (Auto) 

2.1, Basophils (%) (Auto) 1.0, Sodium Level 135L, Potassium Level 4.3, Chloride 

Level 102, Carbon Dioxide Level 27, Anion Gap 7, Blood Urea Nitrogen 22H, 

Creatinine 1.2, Estimat Glomerular Filtration Rate 46.7, Glucose Level 95, 

Calcium Level 8.2L


Height (Feet):  5


Height (Inches):  7.00


Weight (Pounds):  317


General Appearance:  no apparent distress, alert


Neurologic:  oriented x 3, responsive, depressed affect











Cassandra Colon MD Jul 31, 2018 15:15

## 2018-07-31 NOTE — INTERNAL MED PROGRESS NOTE
Subjective


Date of Service:  Jul 31, 2018


Physician Name


Ludwin Fulton


Attending Physician


Medhat Vogt MD


Allergies:  


Coded Allergies:  


     LAMOTRIGINE (Verified  Allergy, Mild, Hives, 2/2/13)


     PENICILLINS (Verified  Allergy, Mild, Hives, 2/2/13)


     CODEINE (Verified  Allergy, Unknown, 7/26/18)


 ITCHING


     ERYTHROMYCIN BASE (Unverified  Allergy, Unknown, Rash, 2/25/15)


     PREGABALIN (Verified  Allergy, Unknown, 2/25/15)


 SWELLING


     SERTRALINE (Verified  Allergy, Unknown, 4/16/18)


     TRAMADOL HCL (Verified  Allergy, Unknown, 2/25/15)


 VOMIT


     ASPIRIN (Verified  Adverse Reaction, Severe, 7/26/18)


 NAUSEA AND VOMITING


     GABAPENTIN (Verified  Adverse Reaction, Intermediate, 7/28/18)


ROS Limited/Unobtainable:  No


Constitutional:  Reports: no symptoms


HEENT:  Reports: no symptoms


Cardiovascular:  Reports: no symptoms


Respiratory:  Reports: no symptoms


Gastrointestinal/Abdominal:  Reports: no symptoms


Genitourinary:  Reports: no symptoms


Neurologic/Psychiatric:  Reports: no symptoms


Subjective


58 YO F admitted with chief complaint left knee pain.  Cover for Int Med-Dr Vogt.  Await transfer to [facility]





Objective





Last Vital Signs








  Date Time  Temp Pulse Resp B/P (MAP) Pulse Ox O2 Delivery O2 Flow Rate FiO2


 


7/31/18 15:34 97.5       


 


7/31/18 12:00  66 19 115/51 (72) 97   


 


7/31/18 09:58      Room Air  











Laboratory Tests








Test


  7/31/18


09:20


 


White Blood Count


  9.5 K/UL


(4.8-10.8)


 


Red Blood Count


  3.83 M/UL


(4.20-5.40)  L


 


Hemoglobin


  10.7 G/DL


(12.0-16.0)  L


 


Hematocrit


  34.0 %


(37.0-47.0)  L


 


Mean Corpuscular Volume 89 FL (80-99)  


 


Mean Corpuscular Hemoglobin


  27.9 PG


(27.0-31.0)


 


Mean Corpuscular Hemoglobin


Concent 31.5 G/DL


(32.0-36.0)  L


 


Red Cell Distribution Width


  15.5 %


(11.6-14.8)  H


 


Platelet Count


  277 K/UL


(150-450)


 


Mean Platelet Volume


  6.0 FL


(6.5-10.1)  L


 


Neutrophils (%) (Auto)


  79.2 %


(45.0-75.0)  H


 


Lymphocytes (%) (Auto)


  11.7 %


(20.0-45.0)  L


 


Monocytes (%) (Auto)


  6.0 %


(1.0-10.0)


 


Eosinophils (%) (Auto)


  2.1 %


(0.0-3.0)


 


Basophils (%) (Auto)


  1.0 %


(0.0-2.0)


 


Sodium Level


  135 MMOL/L


(136-145)  L


 


Potassium Level


  4.3 MMOL/L


(3.5-5.1)


 


Chloride Level


  102 MMOL/L


()


 


Carbon Dioxide Level


  27 MMOL/L


(21-32)


 


Anion Gap


  7 mmol/L


(5-15)


 


Blood Urea Nitrogen


  22 mg/dL


(7-18)  H


 


Creatinine


  1.2 MG/DL


(0.55-1.30)


 


Estimat Glomerular Filtration


Rate 46.7 mL/min


(>60)


 


Glucose Level


  95 MG/DL


()


 


Calcium Level


  8.2 MG/DL


(8.5-10.1)  L

















Intake and Output  


 


 7/30/18 7/31/18





 19:00 07:00


 


Intake Total 1320 ml 


 


Balance 1320 ml 


 


  


 


Intake Oral 1320 ml 


 


# Voids 6 3











Assessment/Plan


Problem List:  


(1) Pain, joint, knee, left


Assessment & Plan:  Await ortho consult





(2) Swelling of knee joint, left


(3) Post-op pain


(4) HTN (hypertension)


Assessment & Plan:  valsartan not formulary; start irbesartan





(5) Obesity


(6) Degenerative disc disease, lumbar


(7) Fibromyalgia


(8) Bipolar 1 disorder, depressed


Assessment & Plan:  Continue trazodone and cymbalta





Assessment/Plan


Discharge to [facility] Ludwin Astorga MD Jul 31, 2018 17:56

## 2018-10-05 NOTE — DIAGNOSTIC IMAGING REPORT
Indications: Head pain, status post fall, neck and back pain

 

Technique: Spiral acquisitions obtained through the brain. Angled axial and coronal 5

x 5 mm slices were reconstructed. Total dose length product 2002.97 mGycm.  CTDI

vol(s) 70.38,23.09 mGy. Dose reduction achieved using automated exposure control

 

Comparison: None.

 

Findings: No acute intracranial hemorrhage or edema. No mass effect nor midline

shift. Normal size ventricles and extra axial CSF spaces. Normal gray-white

differentiation. There is a small left maxillary mucous retention cyst or polyp. The

calvarium is intact.

 

Impression: Negative

 

Incidental finding of small left maxillary mucous retention cyst or polyp

 

 

 

The CT scanner at [facility] is accredited by the American College of

Radiology and the scans are performed using protocols designed to limit radiation

exposure to as low as reasonably achievable to attain images of sufficient resolution

adequate for diagnostic evaluation.

## 2018-10-05 NOTE — DIAGNOSTIC IMAGING REPORT
Indication: Reason For Exam: PAIN

 

Technique: 3 views of the left shoulder

 

Comparison: None

 

Findings: No acute fractures or dislocations. Joint spaces are preserved.

 

Impression:  Negative

## 2018-10-05 NOTE — DIAGNOSTIC IMAGING REPORT
Indication: Pain, status post fall

 

Technique: Spiral acquisitions obtained through the cervical spine. No IV contrast

utilized. Multiplanar reconstructions were generated.  Total dose length product

2002.97 mGycm. CTDIvol(s) 70.38,23.09 mGy. Dose reduction achieved using automated

exposure control.

 

 

Comparison: none

 

Findings: Bony alignment is normal. Vertebral body heights are preserved. There is

minimal degenerative disc narrowing at C5-6. No acute fractures. No dislocations. No

prevertebral soft tissue swelling.

 

There is mild neural foraminal stenosis at C4-5 on the right. No significant disc

bulge or protrusion.

 

There is mild neural foraminal stenosis at C5-6 on the left. No significant spinal

stenosis, disc bulge, or protrusion.

 

At the remaining disc levels, no significant disc bulge or protrusion, spinal

stenosis, or neural foraminal narrowing.

 

The included extra spinal soft tissues are unremarkable. The visualized upper

aerodigestive tract is unremarkable.

 

Impression: Minimal degenerative changes, as described. No acute bony trauma

 

 

 

The CT scanner at [facility] is accredited by the American College of

Radiology and the scans are performed using protocols designed to limit radiation

exposure to as low as reasonably achievable to attain images of sufficient resolution

adequate for diagnostic evaluation.

## 2019-10-07 ENCOUNTER — HOSPITAL ENCOUNTER (EMERGENCY)
Dept: HOSPITAL 72 - EMR | Age: 59
Discharge: HOME | End: 2019-10-07
Payer: MEDICARE

## 2019-10-07 VITALS — HEIGHT: 67 IN | BODY MASS INDEX: 42.38 KG/M2 | WEIGHT: 270 LBS

## 2019-10-07 VITALS — SYSTOLIC BLOOD PRESSURE: 125 MMHG | DIASTOLIC BLOOD PRESSURE: 67 MMHG

## 2019-10-07 DIAGNOSIS — S80.02XA: Primary | ICD-10-CM

## 2019-10-07 DIAGNOSIS — M19.90: ICD-10-CM

## 2019-10-07 DIAGNOSIS — F32.9: ICD-10-CM

## 2019-10-07 DIAGNOSIS — S00.93XA: ICD-10-CM

## 2019-10-07 DIAGNOSIS — Z88.6: ICD-10-CM

## 2019-10-07 DIAGNOSIS — F41.9: ICD-10-CM
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
## 2019-10-07 DIAGNOSIS — S90.02XA: ICD-10-CM

## 2019-10-07 DIAGNOSIS — Z88.8: ICD-10-CM

## 2019-10-07 DIAGNOSIS — I10: ICD-10-CM

## 2019-10-07 DIAGNOSIS — Z88.1: ICD-10-CM

## 2019-10-07 DIAGNOSIS — Z86.73: ICD-10-CM
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
## 2019-10-07 DIAGNOSIS — Z88.0: ICD-10-CM

## 2019-10-07 PROCEDURE — 96372 THER/PROPH/DIAG INJ SC/IM: CPT

## 2019-10-07 PROCEDURE — 70450 CT HEAD/BRAIN W/O DYE: CPT

## 2019-10-07 PROCEDURE — 73562 X-RAY EXAM OF KNEE 3: CPT

## 2019-10-07 PROCEDURE — 99284 EMERGENCY DEPT VISIT MOD MDM: CPT

## 2019-10-07 PROCEDURE — 73610 X-RAY EXAM OF ANKLE: CPT

## 2019-10-07 NOTE — DIAGNOSTIC IMAGING REPORT
Indication: Pain, status post fall

 

Technique: 3 views of the left knee

 

Comparison: None

 

Findings: No suprapatellar effusion. There are degenerative changes of the

patellofemoral joint compartment and mild narrowing of the medial joint compartment.

No acute fractures. No dislocations.

 

Impression: Degenerative changes as described

 

No acute bony trauma

## 2019-10-07 NOTE — DIAGNOSTIC IMAGING REPORT
Indication: Trauma, pain

 

Technique: 3 views of the left ankle

 

Comparison: none 

 

Findings: No acute fractures. No dislocations. Joint spaces are preserved. There are

small plantar and calcaneal spurs. No significant interim change

 

Impression: No acute process

## 2019-10-07 NOTE — DIAGNOSTIC IMAGING REPORT
Indications: Head pain, status post fall

 

Technique: Spiral acquisitions obtained through the brain. Angled axial and coronal 5

x 5 mm slices were reconstructed. Total dose length product 1338 mGycm.  CTDI vol(s)

62 mGy. Dose reduction achieved using automated exposure control

 

Comparison: 10/5/2018

 

Findings: No acute fractures. No dislocations. Normal gray-white differentiation.

Normal size ventricles and extra-axial CSF spaces. Intact calvarium. There is minimal

ethmoid sinus disease. The mastoids are clear. Visualized orbits are unremarkable

 

Impression: Negative

 

 

 

 

 

The CT scanner at [facility] is accredited by the American College of

Radiology and the scans are performed using protocols designed to limit radiation

exposure to as low as reasonably achievable to attain images of sufficient resolution

adequate for diagnostic evaluation.

## 2019-10-07 NOTE — EMERGENCY ROOM REPORT
History of Present Illness


General


Chief Complaint:  Lower Extremity Injury


Source:  Medical Record





Present Illness


HPI


58-year-old female with history of hypertension currently controlled, and right 

meniscus tear which was repaired recently by Dr. Fletcher, here complaining of 

pain in left knee and left ankle as well as head after a fall that occurred 

yesterday.  Patient reports that due to her previous knee surgery she is 

ambulating with a cane, lost her balance due to cane use yesterday in the 

middle of the parking lot and fell hitting the left side of her head,, and left-

sided ankle.  Patient denies any loss of consciousness, dizziness, blurry 

vision however complains of nausea.  Rating the pain in her left ankle and knee 

7 out of 10 without radiation denying tingling and numbness.  Patient also 

reports loss of swelling in the left lower extremity after the fall.  Denies 

all other injuries, chest pain, shortness of breath, palpitation, and other 

associated symptoms.  Has been taking Norco with minimal relief.


Allergies:  


Coded Allergies:  


     LAMOTRIGINE (Verified  Allergy, Mild, Hives, 10/5/18)


     PENICILLINS (Verified  Allergy, Mild, Hives, 10/5/18)


     CODEINE (Verified  Allergy, Unknown, 7/26/18)


 ITCHING


     ERYTHROMYCIN BASE (Unverified  Allergy, Unknown, Rash, 10/5/18)


     PREGABALIN (Verified  Allergy, Unknown, 10/5/18)


 SWELLING


     SERTRALINE (Verified  Allergy, Unknown, 10/5/18)


     TRAMADOL HCL (Verified  Allergy, Unknown, 2/25/15)


 VOMIT


     ASPIRIN (Verified  Adverse Reaction, Severe, 7/26/18)


 NAUSEA AND VOMITING





Patient History


Past Medical History:  see triage record


Past Surgical History:  unable to obtain


Pertinent Family History:  none


Last Menstrual Period:  partial hysterectomy


Pregnant Now:  No


Immunizations:  UTD


Reviewed Nursing Documentation:  PMH: Agreed; PSxH: Agreed





Nursing Documentation-PMH


Past Medical History:  No History, Except For


Hx Cardiac Problems:  Yes - osteoarthoritis


Hx Hypertension:  Yes


Hx Cancer:  No


Hx Gastrointestinal Problems:  Yes


Hx Neurological Problems:  Yes - Anxiety, depression


Hx Transient Ischemic Attacks:  Yes


Hx Syncope:  Yes





Review of Systems


All Other Systems:  negative except mentioned in HPI





Physical Exam





Vital Signs








  Date Time  Temp Pulse Resp B/P (MAP) Pulse Ox O2 Delivery O2 Flow Rate FiO2


 


10/7/19 15:28 98.2 75 18 125/67 (86) 96 Room Air  








Sp02 EP Interpretation:  reviewed, normal


General Appearance:  no apparent distress, alert, GCS 15, non-toxic


Head:  normocephalic, atraumatic


Eyes:  bilateral eye normal inspection, bilateral eye PERRL


ENT:  hearing grossly normal, normal pharynx, no angioedema, normal voice


Neck:  full range of motion, supple/symm/no masses


Respiratory:  chest non-tender, lungs clear, normal breath sounds, speaking 

full sentences


Cardiovascular #1:  regular rate, rhythm, no edema, no murmur, normal capillary 

refill


Cardiovascular #2:  2+ dorsalis pedis (R), 2+ dorsalis pedis (L)


Gastrointestinal:  normal inspection, normal bowel sounds, non tender, soft, no 

mass, no guarding


Genitourinary:  no CVA tenderness


Musculoskeletal:  non-tender, no calf tenderness, pelvis stable, Mauricio's Sign 

negative, swelling - left knee, left lateral ankle


Neurologic:  alert, oriented x3, responsive, motor strength/tone normal, 

sensory intact, speech normal


Psychiatric:  normal inspection, judgement/insight normal, memory normal


Lymphatic:  no adenopathy





Procedures


Splinting


Splinting :  


   Consent:  Verbal


   Location:  left knee and ankle


   Pre-Made Type:  ACE wrap


   Pre-Proc Neuro Vasc Exam:  normal


   Post-Proc Neuro Vasc Exam:  normal


   Patient Tolerated:  Well


   Complications:  None





Medical Decision Making


PA Attestation


All my diagnosis and treatment plans were reviewed ad discussed with my 

supervising physician Dr. Torres


Diagnostic Impression:  


 Primary Impression:  


 Head contusion


 Additional Impressions:  


 Knee contusion


 Left ankle sprain


ER Course


58-year-old female with history of hypertension currently controlled, and right 

meniscus tear which was repaired recently by Dr. Fletcher, here complaining of 

pain in left knee and left ankle as well as head after a fall that occurred 

yesterday.  Patient reports that due to her previous knee surgery she is 

ambulating with a cane, lost her balance due to cane use yesterday in the 

middle of the parking lot and fell hitting the left side of her head,, and left-

sided ankle.  Patient denies any loss of consciousness, dizziness, blurry 

vision however complains of nausea.  Rating the pain in her left ankle and knee 

7 out of 10 without radiation denying tingling and numbness.  Patient also 

reports loss of swelling in the left lower extremity after the fall.  Denies 

all other injuries, chest pain, shortness of breath, palpitation, and other 

associated symptoms.  Has been taking Norco with minimal relief.





Ddx considered but are not limited to: cerebral hematoma, concussion, skull 

fracture, head contusion, knee contusion versus fracture versus sprain, ankle 

sprain versus strain versus contusion versus fracture














Vital signs: are WNL, pt. is afebrile








 H&PE are most consistent with: Left ankle sprain, contusion, knee contusion














ORDERS: head CT no contrast, left knee x-ray, left ankle x-ray, duplex venous 

ultrasound of left lower extremity however patient refused to do it as she did 

not want to take her clothes off in order to do the ultrasound.














ED INTERVENTIONS: Tylenol, Toradol, Zofran




















DISCHARGE: At this time pt. is stable for d/c to home. Will provide printed 

patient care instructions, and any necessary prescriptions. Care plan and 

follow up instructions have been discussed with the patient prior to discharge.

  After speaking with Dr. Vogt, patient's primary care physician it was agreed 

to send patient home and have her follow-up with him if any new symptoms or 

worsening symptoms.


Other X-Ray Diagnostic Results


Other X-Ray Diagnostic Results #1:  


   X-Ray ordered:  left knee


   # of Views/Limited Vs Complete:  3 View


   Indication:  Swelling


   EP Interpretation:  Yes


   PA Xray:  Interpretation reviewed, by supervising MD, and agrees with 

findings.


   Interpretation:  no dislocation, no fractures


   Impression:  No acute disease


   Electronically Signed by:  Mathew Jaimes PA-C


Other X-Ray Diagnostic Results #2:  


   X-Ray ordered:  left ankle


   # of Views/Limited Vs Complete:  3 View


   Indication:  Swelling


   EP Interpretation:  Yes


   PA Xray:  Interpretation reviewed, by supervising MD, and agrees with 

findings.


   Interpretation:  no dislocation, no fractures


   Impression:  No acute disease


   Electronically Signed by:  Mathew Jaimes PA-C





CT/MRI/US Diagnostic Results


CT/MRI/US Diagnostic Results :  


   Imaging Test Ordered:  head CT no contrast


   Impression


No intracranial hemorrhage, no skull fracture





Last Vital Signs








  Date Time  Temp Pulse Resp B/P (MAP) Pulse Ox O2 Delivery O2 Flow Rate FiO2


 


10/7/19 15:28 98.2 75 18 125/67 (86) 96 Room Air  








Disposition:  HOME, SELF-CARE


Condition:  Stable


Scripts


Ondansetron (Zofran) 4 Mg Tablet


4 MG ORAL Q6H PRN for Nausea & Vomiting, #10 TAB


   Prov: Mathew Francisco         10/7/19 


Acetaminophen* (TYLENOL EXTRA STRENGTH*) 500 Mg Tablet


1000 MG ORAL Q6H, #30 TAB


   Prov: Mathew Francisco         10/7/19 


Diclofenac Sodium (VOLTAREN) 100 Gm Gel..gram.


2 GM TP TID, #100 GM


   Prov: Mathew Francisco         10/7/19


Referrals:  


Medhat Vogt MD (PCP)


Patient Instructions:  Ankle Sprain, Facial or Scalp Contusion, Easy-to-Read, 

Knee Pain, Easy-to-Read





Additional Instructions:  


Take medication as directed follow-up with your primary care provider if 

worsening symptoms return to the emergency room











Mathew Francisco Oct 7, 2019 17:08

## 2019-10-14 ENCOUNTER — HOSPITAL ENCOUNTER (INPATIENT)
Dept: HOSPITAL 72 - EMR | Age: 59
LOS: 3 days | Discharge: NURSING, REHAB OR LONG-TERM CARE | DRG: 313 | End: 2019-10-17
Payer: MEDICARE

## 2019-10-14 VITALS — HEIGHT: 67 IN | BODY MASS INDEX: 43.2 KG/M2 | WEIGHT: 275.25 LBS

## 2019-10-14 VITALS — DIASTOLIC BLOOD PRESSURE: 55 MMHG | SYSTOLIC BLOOD PRESSURE: 79 MMHG

## 2019-10-14 VITALS — DIASTOLIC BLOOD PRESSURE: 81 MMHG | SYSTOLIC BLOOD PRESSURE: 138 MMHG

## 2019-10-14 DIAGNOSIS — M79.7: ICD-10-CM

## 2019-10-14 DIAGNOSIS — G62.9: ICD-10-CM

## 2019-10-14 DIAGNOSIS — F45.0: ICD-10-CM

## 2019-10-14 DIAGNOSIS — M25.572: ICD-10-CM

## 2019-10-14 DIAGNOSIS — Z88.0: ICD-10-CM

## 2019-10-14 DIAGNOSIS — M17.12: ICD-10-CM

## 2019-10-14 DIAGNOSIS — F41.9: ICD-10-CM

## 2019-10-14 DIAGNOSIS — Z88.8: ICD-10-CM

## 2019-10-14 DIAGNOSIS — F15.20: ICD-10-CM

## 2019-10-14 DIAGNOSIS — Z88.1: ICD-10-CM

## 2019-10-14 DIAGNOSIS — R07.89: Primary | ICD-10-CM

## 2019-10-14 DIAGNOSIS — E66.9: ICD-10-CM

## 2019-10-14 DIAGNOSIS — M17.0: ICD-10-CM

## 2019-10-14 DIAGNOSIS — F31.9: ICD-10-CM

## 2019-10-14 DIAGNOSIS — I10: ICD-10-CM

## 2019-10-14 DIAGNOSIS — Z98.84: ICD-10-CM

## 2019-10-14 DIAGNOSIS — R00.1: ICD-10-CM

## 2019-10-14 LAB
ADD MANUAL DIFF: NO
ALBUMIN SERPL-MCNC: 3.2 G/DL (ref 3.4–5)
ALBUMIN/GLOB SERPL: 0.8 {RATIO} (ref 1–2.7)
ALP SERPL-CCNC: 97 U/L (ref 46–116)
ALT SERPL-CCNC: 23 U/L (ref 12–78)
ANION GAP SERPL CALC-SCNC: 6 MMOL/L (ref 5–15)
APTT BLD: 29 SEC (ref 23–33)
AST SERPL-CCNC: 13 U/L (ref 15–37)
BASOPHILS NFR BLD AUTO: 0.8 % (ref 0–2)
BILIRUB SERPL-MCNC: 0.3 MG/DL (ref 0.2–1)
BUN SERPL-MCNC: 20 MG/DL (ref 7–18)
CALCIUM SERPL-MCNC: 8.9 MG/DL (ref 8.5–10.1)
CHLORIDE SERPL-SCNC: 105 MMOL/L (ref 98–107)
CHOLEST SERPL-MCNC: 151 MG/DL (ref ?–200)
CK MB SERPL-MCNC: 0.5 NG/ML (ref 0–3.6)
CK SERPL-CCNC: 67 U/L (ref 26–308)
CO2 SERPL-SCNC: 28 MMOL/L (ref 21–32)
CREAT SERPL-MCNC: 1 MG/DL (ref 0.55–1.3)
EOSINOPHIL NFR BLD AUTO: 3.2 % (ref 0–3)
ERYTHROCYTE [DISTWIDTH] IN BLOOD BY AUTOMATED COUNT: 15.1 % (ref 11.6–14.8)
GLOBULIN SER-MCNC: 4.1 G/DL
HCT VFR BLD CALC: 32.4 % (ref 37–47)
HDLC SERPL-MCNC: 70 MG/DL (ref 40–60)
HGB BLD-MCNC: 9.9 G/DL (ref 12–16)
INR PPP: 0.9 (ref 0.9–1.1)
LYMPHOCYTES NFR BLD AUTO: 32.1 % (ref 20–45)
MCV RBC AUTO: 89 FL (ref 80–99)
MONOCYTES NFR BLD AUTO: 7.4 % (ref 1–10)
NEUTROPHILS NFR BLD AUTO: 56.6 % (ref 45–75)
PLATELET # BLD: 338 K/UL (ref 150–450)
POTASSIUM SERPL-SCNC: 3.9 MMOL/L (ref 3.5–5.1)
RBC # BLD AUTO: 3.63 M/UL (ref 4.2–5.4)
SODIUM SERPL-SCNC: 139 MMOL/L (ref 136–145)
TRIGL SERPL-MCNC: 40 MG/DL (ref 30–150)
WBC # BLD AUTO: 5.4 K/UL (ref 4.8–10.8)

## 2019-10-14 PROCEDURE — 84443 ASSAY THYROID STIM HORMONE: CPT

## 2019-10-14 PROCEDURE — 99285 EMERGENCY DEPT VISIT HI MDM: CPT

## 2019-10-14 PROCEDURE — 93306 TTE W/DOPPLER COMPLETE: CPT

## 2019-10-14 PROCEDURE — 83880 ASSAY OF NATRIURETIC PEPTIDE: CPT

## 2019-10-14 PROCEDURE — 94664 DEMO&/EVAL PT USE INHALER: CPT

## 2019-10-14 PROCEDURE — 84484 ASSAY OF TROPONIN QUANT: CPT

## 2019-10-14 PROCEDURE — 80061 LIPID PANEL: CPT

## 2019-10-14 PROCEDURE — 82553 CREATINE MB FRACTION: CPT

## 2019-10-14 PROCEDURE — 96374 THER/PROPH/DIAG INJ IV PUSH: CPT

## 2019-10-14 PROCEDURE — 82550 ASSAY OF CK (CPK): CPT

## 2019-10-14 PROCEDURE — 85730 THROMBOPLASTIN TIME PARTIAL: CPT

## 2019-10-14 PROCEDURE — 86140 C-REACTIVE PROTEIN: CPT

## 2019-10-14 PROCEDURE — 78452 HT MUSCLE IMAGE SPECT MULT: CPT

## 2019-10-14 PROCEDURE — 85025 COMPLETE CBC W/AUTO DIFF WBC: CPT

## 2019-10-14 PROCEDURE — 80053 COMPREHEN METABOLIC PANEL: CPT

## 2019-10-14 PROCEDURE — 93005 ELECTROCARDIOGRAM TRACING: CPT

## 2019-10-14 PROCEDURE — 93017 CV STRESS TEST TRACING ONLY: CPT

## 2019-10-14 PROCEDURE — 85610 PROTHROMBIN TIME: CPT

## 2019-10-14 PROCEDURE — 83690 ASSAY OF LIPASE: CPT

## 2019-10-14 PROCEDURE — 71045 X-RAY EXAM CHEST 1 VIEW: CPT

## 2019-10-14 PROCEDURE — 80048 BASIC METABOLIC PNL TOTAL CA: CPT

## 2019-10-14 PROCEDURE — 80307 DRUG TEST PRSMV CHEM ANLYZR: CPT

## 2019-10-14 PROCEDURE — 36415 COLL VENOUS BLD VENIPUNCTURE: CPT

## 2019-10-14 RX ADMIN — TRAZODONE HYDROCHLORIDE SCH MG: 100 TABLET ORAL at 21:32

## 2019-10-14 RX ADMIN — HEPARIN SODIUM SCH UNITS: 5000 INJECTION INTRAVENOUS; SUBCUTANEOUS at 21:37

## 2019-10-14 NOTE — NUR
NURSE NOTES:

Received report from THALIA Walsh.  Pt in bed, resting.  In no acute distress, breathing even and 
unlabored. bed in lowest position.  will continue plan of care.

## 2019-10-14 NOTE — NUR
TRANSFER TO FLOOR:

Patient transferred to  as ordered, per Dr. Vogt.   Report given to THALIA Epps.  Belongings and medications given to

THALIA Epps. Family and or S/O informed of transfer. 

-------------------------------------------------------------------------------

Addendum: 10/15/19 at 0746 by [email or web address]

-------------------------------------------------------------------------------

ED Nurse Note:

Report given to THALIA Jones

## 2019-10-14 NOTE — DIAGNOSTIC IMAGING REPORT
Indication: Chest

 

Technique: One view of the chest

 

Comparison: 7/26/2016

 

Findings: Lungs and pleural spaces are clear. Heart size is normal. No significant

interim change

 

Impression: No acute process

## 2019-10-14 NOTE — NUR
NURSE NOTES:

Patient complaining of left knee pain 8/10 requesting pain medications. Dr. Arellano made 
aware new order received for PRN norco. patient aware but she declined pain medication 
stated Norco doesnt work for her and requesting IV morphine. call out to MD. will follow.

## 2019-10-14 NOTE — NUR
-------------------------------------------------------------------------------

          *** Note undone in EDM - 10/15/19 at 0746 by [email or web address] ***          

-------------------------------------------------------------------------------

ED Nurse Note:

Report given to THALIA Jones.

## 2019-10-14 NOTE — HISTORY & PHYSICAL
History and Physical


History & Physicial


Dictated for Int Med-Ludwin Roque MD Oct 14, 2019 19:15

## 2019-10-14 NOTE — NUR
ED Nurse Note:

Pt aaox4, vss, pt is uneasy and uncomfortable with men in the room with her. Pt 
sent by dr walton for eval due CP, frequent falls and unsteady gait x couple of 
weeks. No skin issues of note.

## 2019-10-14 NOTE — CARDIOLOGY PROGRESS NOTE
Assessment/Plan


Assessment/Plan


came bu tp see pt she is sleeping will evalaute tomorrow 


ekg noted neg 


trop 2 sets neg





Objective





Last 24 Hour Vital Signs








  Date Time  Temp Pulse Resp B/P (MAP) Pulse Ox O2 Delivery O2 Flow Rate FiO2


 


10/14/19 16:13      Room Air  


 


10/14/19 16:12  61      


 


10/14/19 14:22 98.0       


 


10/14/19 13:15  68 15   Room Air  


 


10/14/19 13:15 98.8 66 18 138/81 96 Room Air  


 


10/14/19 13:02 98.1 61 15 145/82 (103) 96 Room Air  











Laboratory Tests








Test


  10/14/19


13:29 10/14/19


14:41 10/14/19


17:20


 


White Blood Count


  5.4 K/UL


(4.8-10.8) 


  


 


 


Red Blood Count


  3.63 M/UL


(4.20-5.40)  L 


  


 


 


Hemoglobin


  9.9 G/DL


(12.0-16.0)  L 


  


 


 


Hematocrit


  32.4 %


(37.0-47.0)  L 


  


 


 


Mean Corpuscular Volume 89 FL (80-99)    


 


Mean Corpuscular Hemoglobin


  27.2 PG


(27.0-31.0) 


  


 


 


Mean Corpuscular Hemoglobin


Concent 30.5 G/DL


(32.0-36.0)  L 


  


 


 


Red Cell Distribution Width


  15.1 %


(11.6-14.8)  H 


  


 


 


Platelet Count


  338 K/UL


(150-450) 


  


 


 


Mean Platelet Volume


  5.4 FL


(6.5-10.1)  L 


  


 


 


Neutrophils (%) (Auto)


  56.6 %


(45.0-75.0) 


  


 


 


Lymphocytes (%) (Auto)


  32.1 %


(20.0-45.0) 


  


 


 


Monocytes (%) (Auto)


  7.4 %


(1.0-10.0) 


  


 


 


Eosinophils (%) (Auto)


  3.2 %


(0.0-3.0)  H 


  


 


 


Basophils (%) (Auto)


  0.8 %


(0.0-2.0) 


  


 


 


Prothrombin Time


  9.5 SEC


(9.30-11.50) 


  


 


 


Prothromb Time International


Ratio 0.9 (0.9-1.1)  


  


  


 


 


Activated Partial


Thromboplast Time 29 SEC (23-33)


  


  


 


 


Sodium Level


  139 MMOL/L


(136-145) 


  


 


 


Potassium Level


  3.9 MMOL/L


(3.5-5.1) 


  


 


 


Chloride Level


  105 MMOL/L


() 


  


 


 


Carbon Dioxide Level


  28 MMOL/L


(21-32) 


  


 


 


Anion Gap


  6 mmol/L


(5-15) 


  


 


 


Blood Urea Nitrogen


  20 mg/dL


(7-18)  H 


  


 


 


Creatinine


  1.0 MG/DL


(0.55-1.30) 


  


 


 


Estimat Glomerular Filtration


Rate > 60 mL/min


(>60) 


  


 


 


Glucose Level


  100 MG/DL


() 


  


 


 


Calcium Level


  8.9 MG/DL


(8.5-10.1) 


  


 


 


Total Bilirubin


  0.3 MG/DL


(0.2-1.0) 


  


 


 


Aspartate Amino Transf


(AST/SGOT) 13 U/L (15-37)


L 


  


 


 


Alanine Aminotransferase


(ALT/SGPT) 23 U/L (12-78)


  


  


 


 


Alkaline Phosphatase


  97 U/L


() 


  


 


 


Total Creatine Kinase


  67 U/L


() 


  


 


 


Creatine Kinase MB


  0.5 NG/ML


(0.0-3.6) 


  


 


 


Creatine Kinase MB Relative


Index 0.7  


  


  


 


 


Troponin I


  0.000 ng/mL


(0.000-0.056) 


  0.000 ng/mL


(0.000-0.056)


 


Pro-B-Type Natriuretic Peptide


  185 pg/mL


(0-125)  H 


  


 


 


Total Protein


  7.3 G/DL


(6.4-8.2) 


  


 


 


Albumin


  3.2 G/DL


(3.4-5.0)  L 


  


 


 


Globulin 4.1 g/dL    


 


Albumin/Globulin Ratio


  0.8 (1.0-2.7)


L 


  


 


 


Triglycerides Level


  40 MG/DL


() 


  


 


 


Cholesterol Level


  151 MG/DL (<


200) 


  


 


 


LDL Cholesterol


  71 mg/dL


(<100) 


  


 


 


HDL Cholesterol


  70 MG/DL


(40-60)  H 


  


 


 


Cholesterol/HDL Ratio


  2.2 (3.3-4.4)


L 


  


 


 


Lipase


  80 U/L


() 


  


 


 


Urine Opiates Screen


  


  Negative


(NEGATIVE) 


 


 


Urine Barbiturates Screen


  


  Negative


(NEGATIVE) 


 


 


Phencyclidine (PCP) Screen


  


  Negative


(NEGATIVE) 


 


 


Urine Amphetamines Screen


  


  Negative


(NEGATIVE) 


 


 


Urine Benzodiazepines Screen


  


  Negative


(NEGATIVE) 


 


 


Urine Cocaine Screen


  


  Negative


(NEGATIVE) 


 


 


Urine Marijuana (THC) Screen


  


  Negative


(NEGATIVE) 


 

















Fili De La Fuente MD Oct 14, 2019 21:02

## 2019-10-14 NOTE — NUR
NURSE NOTES:

Received patient from Geraldine Morrow Patient is being admitted from ER. Patient arrived to the 
floor transported via gurney transferred to bed without difficulty. patient oriented to room 
and safety. fall precautions initiated. Side rails X2 up. bed locked to lowest position. 
call bell within patients reach. bed alarm on. cardiac monitor applied. awaiting Md's order. 
will follow.

## 2019-10-14 NOTE — EMERGENCY ROOM REPORT
History of Present Illness


General


Chief Complaint:  Chest Pain


Source:  Patient





Present Illness


HPI


58-year-old female possible history of obesity, anxiety, presents with chest 

pain, retrosternal, no aggravating relieving factors severity is moderate, 

intermittent lasting minutes, she feels short of breath feels tightness in her 

chest, patient has a family history of cardiac disease, mother has CHF, her 

last stress test currently was 3 years ago.  Patient is requesting Dilaudid/

morphine for pain.  No diaphoresis no nausea no vomiting patient presents for 

evaluation from Dr. VOGT office


Allergies:  


Coded Allergies:  


     LAMOTRIGINE (Verified  Allergy, Mild, Hives, 10/5/18)


     PENICILLINS (Verified  Allergy, Mild, Hives, 10/5/18)


     ERYTHROMYCIN BASE (Unverified  Allergy, Unknown, Rash, 10/5/18)


     PREGABALIN (Verified  Allergy, Unknown, 10/5/18)


 SWELLING


     SERTRALINE (Verified  Allergy, Unknown, 10/5/18)


     TRAMADOL HCL (Verified  Allergy, Unknown, 2/25/15)


 VOMIT





Patient History


Past Medical History:  see triage record


Pregnant Now:  No


Reviewed Nursing Documentation:  PMH: Agreed; PSxH: Agreed





Nursing Documentation-PMH


Past Medical History:  No History, Except For


Hx Cardiac Problems:  Yes - osteoarthoritis


Hx Hypertension:  Yes


Hx Cancer:  No


Hx Gastrointestinal Problems:  Yes


Hx Neurological Problems:  Yes - Anxiety, depression


Hx Transient Ischemic Attacks:  Yes


Hx Syncope:  Yes





Review of Systems


All Other Systems:  negative except mentioned in HPI





Physical Exam





Vital Signs








  Date Time  Temp Pulse Resp B/P (MAP) Pulse Ox O2 Delivery O2 Flow Rate FiO2


 


10/14/19 13:02 98.1 61 15 145/82 (103) 96 Room Air  








Sp02 EP Interpretation:  reviewed, normal


General Appearance:  well appearing, no apparent distress, alert


Head:  normocephalic, atraumatic


Eyes:  bilateral eye PERRL, bilateral eye EOMI


ENT:  uvula midline, moist mucus membranes


Neck:  supple, thyroid normal, supple/symm/no masses


Respiratory:  lungs clear, no respiratory distress, no retraction, no accessory 

muscle use


Cardiovascular #1:  normal peripheral pulses, regular rate, rhythm, no edema, 

no gallop, no murmur


Gastrointestinal:  non tender, soft, no guarding, no rebound


Musculoskeletal:  normal inspection


Neurologic:  alert, oriented x3


Psychiatric:  mood/affect normal


Skin:  no rash, warm/dry





Medical Decision Making


Diagnostic Impression:  


 Primary Impression:  


 Chest pain


 Qualified Codes:  R07.9 - Chest pain, unspecified


ER Course


58-year-old female presents with chest pain concerning for ACS.  Differential 

diagnosis includes pneumonia ACS, pneumothorax


Chest x-ray shows no acute cardia pulmonary pathology, EKG shows no acute MI, 

troponin is negative we will admit patient for serial troponins and evaluation.

  Patient admitted to Dr. Vogt





Laboratory Tests








Test


  10/14/19


13:29


 


White Blood Count


  5.4 K/UL


(4.8-10.8)


 


Red Blood Count


  3.63 M/UL


(4.20-5.40)  L


 


Hemoglobin


  9.9 G/DL


(12.0-16.0)  L


 


Hematocrit


  32.4 %


(37.0-47.0)  L


 


Mean Corpuscular Volume 89 FL (80-99)  


 


Mean Corpuscular Hemoglobin


  27.2 PG


(27.0-31.0)


 


Mean Corpuscular Hemoglobin


Concent 30.5 G/DL


(32.0-36.0)  L


 


Red Cell Distribution Width


  15.1 %


(11.6-14.8)  H


 


Platelet Count


  338 K/UL


(150-450)


 


Mean Platelet Volume


  5.4 FL


(6.5-10.1)  L


 


Neutrophils (%) (Auto)


  56.6 %


(45.0-75.0)


 


Lymphocytes (%) (Auto)


  32.1 %


(20.0-45.0)


 


Monocytes (%) (Auto)


  7.4 %


(1.0-10.0)


 


Eosinophils (%) (Auto)


  3.2 %


(0.0-3.0)  H


 


Basophils (%) (Auto)


  0.8 %


(0.0-2.0)


 


Prothrombin Time


  9.5 SEC


(9.30-11.50)


 


Prothrombin Time INR 0.9 (0.9-1.1)  


 


PTT


  29 SEC (23-33)


 


 


Sodium Level


  139 MMOL/L


(136-145)


 


Potassium Level


  3.9 MMOL/L


(3.5-5.1)


 


Chloride Level


  105 MMOL/L


()


 


Carbon Dioxide Level


  28 MMOL/L


(21-32)


 


Anion Gap


  6 mmol/L


(5-15)


 


Blood Urea Nitrogen


  20 mg/dL


(7-18)  H


 


Creatinine


  1.0 MG/DL


(0.55-1.30)


 


Estimate Glomerular


Filtration Rate > 60 mL/min


(>60)


 


Glucose Level


  100 MG/DL


()


 


Calcium Level


  8.9 MG/DL


(8.5-10.1)


 


Total Bilirubin


  0.3 MG/DL


(0.2-1.0)


 


Aspartate Amino Transferase


(AST) 13 U/L (15-37)


L


 


Alanine Aminotransferase (ALT)


  23 U/L (12-78)


 


 


Alkaline Phosphatase


  97 U/L


()


 


Total Creatine Kinase


  67 U/L


()


 


Creatine Kinase MB


  0.5 NG/ML


(0.0-3.6)


 


Creatine Kinase MB Relative


Index 0.7  


 


 


Troponin I


  0.000 ng/mL


(0.000-0.056)


 


Pro-B-Type Natriuretic Peptide


  185 pg/mL


(0-125)  H


 


Total Protein


  7.3 G/DL


(6.4-8.2)


 


Albumin


  3.2 G/DL


(3.4-5.0)  L


 


Globulin 4.1 g/dL  


 


Albumin/Globulin Ratio


  0.8 (1.0-2.7)


L


 


Triglycerides Level


  40 MG/DL


()


 


Cholesterol Level


  151 MG/DL (<


200)


 


LDL Cholesterol


  71 mg/dL


(<100)


 


HDL Cholesterol


  70 MG/DL


(40-60)  H


 


Cholesterol/HDL Ratio


  2.2 (3.3-4.4)


L


 


Lipase


  80 U/L


()








EKG Diagnostic Results


EKG Time:  13:13


EP Interpretation:  Sinus bradycardia, rate 55, QTc 453, no acute ST elevations

, normal axis





Rhythm Strip Diag. Results


Rhythm Strip Time:  13:47


EP Interpretation:  yes


Rate:  54


Rhythm:  other - Sinus bradycardia





Chest X-Ray Diagnostic Results


Chest X-Ray Diagnostic Results :  


   Chest X-Ray Ordered:  Yes


   # of Views/Limited/Complete:  1 View


   Indication:  Chest Pain


   EP Interpretation:  Yes


   Interpretation:  no consolidation, no effusion, no pneumothorax, no acute 

cardiopulmonary disease


   Impression:  No acute disease


   Electronically Signed by:  Paul Ortiz MD





Last Vital Signs








  Date Time  Temp Pulse Resp B/P (MAP) Pulse Ox O2 Delivery O2 Flow Rate FiO2


 


10/14/19 13:02 98.1 61 15 145/82 (103) 96 Room Air  








Disposition:  ADMITTED AS INPATIENT


Condition:  Stable


Referrals:  


Medhat Vogt MD (PCP)











Paul Ortiz MD Oct 14, 2019 13:48

## 2019-10-14 NOTE — NUR
ED Nurse Note:

Maryan Jones will call back in 19

-------------------------------------------------------------------------------

Addendum: 10/14/19 at 1519 by [email or web address]

-------------------------------------------------------------------------------

lorenza will call back in 10min

## 2019-10-15 VITALS — SYSTOLIC BLOOD PRESSURE: 114 MMHG | DIASTOLIC BLOOD PRESSURE: 60 MMHG

## 2019-10-15 VITALS — DIASTOLIC BLOOD PRESSURE: 64 MMHG | SYSTOLIC BLOOD PRESSURE: 92 MMHG

## 2019-10-15 VITALS — DIASTOLIC BLOOD PRESSURE: 57 MMHG | SYSTOLIC BLOOD PRESSURE: 103 MMHG

## 2019-10-15 VITALS — DIASTOLIC BLOOD PRESSURE: 50 MMHG | SYSTOLIC BLOOD PRESSURE: 101 MMHG

## 2019-10-15 VITALS — DIASTOLIC BLOOD PRESSURE: 61 MMHG | SYSTOLIC BLOOD PRESSURE: 109 MMHG

## 2019-10-15 VITALS — DIASTOLIC BLOOD PRESSURE: 42 MMHG | SYSTOLIC BLOOD PRESSURE: 107 MMHG

## 2019-10-15 LAB
ADD MANUAL DIFF: NO
APTT BLD: 29 SEC (ref 23–33)
BASOPHILS NFR BLD AUTO: 0.6 % (ref 0–2)
CHOLEST SERPL-MCNC: 125 MG/DL (ref ?–200)
EOSINOPHIL NFR BLD AUTO: 3.2 % (ref 0–3)
ERYTHROCYTE [DISTWIDTH] IN BLOOD BY AUTOMATED COUNT: 15.7 % (ref 11.6–14.8)
HCT VFR BLD CALC: 27.5 % (ref 37–47)
HDLC SERPL-MCNC: 58 MG/DL (ref 40–60)
HGB BLD-MCNC: 8.7 G/DL (ref 12–16)
INR PPP: 0.9 (ref 0.9–1.1)
LYMPHOCYTES NFR BLD AUTO: 28.9 % (ref 20–45)
MCV RBC AUTO: 88 FL (ref 80–99)
MONOCYTES NFR BLD AUTO: 7.1 % (ref 1–10)
NEUTROPHILS NFR BLD AUTO: 60.3 % (ref 45–75)
PLATELET # BLD: 278 K/UL (ref 150–450)
RBC # BLD AUTO: 3.12 M/UL (ref 4.2–5.4)
TRIGL SERPL-MCNC: 33 MG/DL (ref 30–150)
WBC # BLD AUTO: 6.2 K/UL (ref 4.8–10.8)

## 2019-10-15 RX ADMIN — CLOTRIMAZOLE AND BETAMETHASONE DIPROPIONATE SCH APPLIC: 10; .5 CREAM TOPICAL at 19:07

## 2019-10-15 RX ADMIN — HYDROCODONE BITARTRATE AND ACETAMINOPHEN PRN TAB: 5; 325 TABLET ORAL at 06:29

## 2019-10-15 RX ADMIN — HEPARIN SODIUM SCH UNITS: 5000 INJECTION INTRAVENOUS; SUBCUTANEOUS at 20:25

## 2019-10-15 RX ADMIN — MORPHINE SULFATE PRN MG: 2 INJECTION, SOLUTION INTRAMUSCULAR; INTRAVENOUS at 18:29

## 2019-10-15 RX ADMIN — TRAZODONE HYDROCHLORIDE SCH MG: 100 TABLET ORAL at 20:24

## 2019-10-15 RX ADMIN — HEPARIN SODIUM SCH UNITS: 5000 INJECTION INTRAVENOUS; SUBCUTANEOUS at 08:55

## 2019-10-15 RX ADMIN — HYDROCODONE BITARTRATE AND ACETAMINOPHEN PRN TAB: 5; 325 TABLET ORAL at 00:33

## 2019-10-15 RX ADMIN — ASPIRIN 81 MG SCH MG: 81 TABLET ORAL at 08:53

## 2019-10-15 NOTE — CONSULTATION
DATE OF CONSULTATION:  10/15/2019

CARDIOLOGY CONSULTATION



CONSULTING PHYSICIAN:  Fili De La Fuente M.D.



REFERRING PHYSICIAN:  Medhat Vogt M.D.



REASON FOR REFERRAL:  Chest pain.



HISTORY OF PRESENT ILLNESS:  This is a middle-aged female, who presented to

the hospital with chest pain that has been going on for approximately 2

weeks off and on, usually lasts about 10 minutes, and this is in the

center of the chest.  She has had a hard time describing it.  Sometimes,

she feels around the back to the right side.  She has not really paid

attention to what exacerbates or relieves the pain.  The pain comes on by

itself and goes away by itself.  She is somewhat active with a walker and

she has the pain whether she is active or she is resting.  She has not

noticed that the pain gets worse with taking a deep breath or coughing.

There is no PND although she uses several pillows because of neck issues.

She has occasional palpitations.  No dizziness or lightheadedness.



PAST MEDICAL HISTORY:  Positive for history of hypertension, fibromyalgia,

anemia, depression, anxiety, obesity, chronic pain, chronic neck pain, low

back pain, degenerative disease of the cervical and lumbar spine,

pseudomembranous colitis, _______ sedative addiction, somatic symptoms

disorder, sacroiliac joint dysfunction on both sides, lumbar

radiculopathy, hip pain, colitis, coccydynia, rhinosinusitis, lumbar

spondylosis.  She has had a history of gastric bypass surgery.  She has

had cervical epidural blocks on several occasions and ganglion blocks as

well.  This is all from [facility].  She has had history of hysterectomy,

joint injections.  She has had a tonsillectomy as well.



ALLERGIES:  Erythromycin, Lamictal, Lyrica, Neurontin, penicillin, Ultram,

Zoloft.



SOCIAL HISTORY:  She denies any smoking, alcohol, or drug use.



REVIEW OF SYSTEMS:  GASTROINTESTINAL:  Positive for constipation.

GENITOURINARY:  Negative.  CONSTITUTIONAL:  She has some sweats.

NEUROLOGICAL:  Negative.



PHYSICAL EXAMINATION:

GENERAL:  Shows to be overweight middle-aged female, in no respiratory

distress.

NECK:  Supple.  No jugular venous distention.

LUNGS:  Appear to be clear to auscultation and percussion.  Chest wall is

tender to palpation.  According to the patient herself, the palpation can

produce the pain that she has been having.

CARDIAC:  S1 is normal.  S2 is normal.  Regular rate and rhythm.  There is

a systolic ejection murmur noted.

ABDOMEN:  Soft, nontender.  Positive bowel sounds.  Obese.

EXTREMITIES:  There is no edema.



LABORATORY AND DIAGNOSTIC DATA:  Three sets of cardiac enzymes are all

negative.  CRP is negative.  Sodium is 139, potassium 3.9, chloride 105,

bicarb 28, BUN 20, creatinine 1.0, glucose of 100.  Liver function tests

are normal.  ProBNP is only 1185.  Albumin of 3.2.  Total cholesterol 151,

HDL of 71, LDL of 70.  Lipase of 80.  TSH of 2.65.  Her white count 6.3,

hemoglobin 8.7, platelet count 278.  INR 0.9, PTT of 29.  Toxicology

screen negative.  Urinalysis 5 to 10 rbc's, 2 to 4 wbc's.  Chest x-ray

performed yesterday was negative.  Tele, normal sinus rhythm on several

that are available for review.  Electrocardiogram showed normal sinus

rhythm, no ST or T-wave abnormalities.



ASSESSMENT AND PLAN:

1. Atypical chest pains.

2. Obesity, status post gastric bypass surgery.

3. History of fibromyalgia.

4. History of hypertension.

5. History of anemia.

6. History of degenerative cervical and lumbar spine.

7. History of somatic symptom disorder.

8. Chest wall tenderness.



Dr. Vogt, this patient was seen in cardiac consultation.  The patient's

electrocardiogram is unremarkable.  All sets of cardiac enzymes are

negative despite the fact that she has been having this pain for the past

2 weeks.  Her EKG is unremarkable.  An echocardiogram has been ordered.

The results of which are pending at this time.  I will review the

echocardiogram.  The patient has not identified any relieving or

exacerbating factors.  The pain apparently occurs at rest as well as with

activity.  It is reproducible by palpation making it likely a

musculoskeletal pain.









  ______________________________________________

  Fili De La Fuente M.D.





DR:  EPIFANIO

D:  10/15/2019 13:30

T:  10/15/2019 17:01

JOB#:  [id number]

CC:

## 2019-10-15 NOTE — NUR
NURSE NOTES:

Received report from natalya VÁSQUEZ, pt. in bed awake, A/O x's 3- able to make needs known, no 
signs or symptoms of acute cardiac or respiratory distress noted, bed alarm on, side rails 
up x's3 and safety brakes engaged, family at bedside, pt. appears to be resting comfortably, 
pt. appears to be sating well on room air- no distress noted, rt. upper arm 22G IV intact 
and patent, safety measures continued, will continue with plan of care.

## 2019-10-15 NOTE — NUR
57 YO FEMALE FROM DR RODRIGUEZ OFFICE TO ER



CC    CHEST PAIN, FREQUENT FALLS,UNSTEADY GAIT



SI:    CHEST PAIN

T. 98.0 HR 61 RR 15 B/P 145/82

BUN 20 

CXR= NO ACUTE PROCESS

2D ECHO EF 60%







IS:    DILAUDID IV

ASA PO

*****ADMITTED TO TELE@ [id number]******

**********TELE STATUS**********







DCP     RETURN HOME

## 2019-10-15 NOTE — NUR
NURSE NOTES:

Pt is in bed, and is requesting to have stronger pain meds and awaiting to talk to doctor 
Kirstin to request stronger meds.  Pt on cardiac monitor no signs of cardiac or respiratory 
distress at this time.   Bed is locked and in lowest position.  Call light within reach.  pt 
cell ph and computer are at bedside.   Pt meal tray is at bedside.

## 2019-10-15 NOTE — CARDIOLOGY REPORT
--------------- APPROVED REPORT --------------





EXAM: Two-dimensional and M-mode echocardiogram with Doppler and color Doppler.



INDICATION

Peripheral Edema 

LV FUNCTION



M-Mode DIMENSIONS 

IVSd1.0 (0.7-1.1cm)Left Atrium (MM)2.8 (1.6-4.0cm)

LVDd5.4 (3.5-5.6cm)Aortic Root3.0 (2.0-3.7cm)

PWd1.1 (0.7-1.1cm)Aortic Cusp Exc.1.8 (1.5-2.0cm)

IVSs1.0 cm

LVDs3.7 (2.5-4.0cm)

PWs1.2 cm





Normal left ventricular chamber size, systolic function and wall motion.

Left ventricular ejection fraction estimated to be  60%.

No evidence of  left ventricular hypertrophy .

No evidence of pericardial effusion. 

All other cardiac chamber sizes are within normal limits. 

Focal aortic valve sclerosis with adequate cusp excursion.

Thickened mitral valve leaflets with normal excursion.

Mitral annulus and aortic root calcification.

Normal pulmonic valve structure. 

Normal tricuspid valve structure. 

IVC dilated at 2.6 cm  without physiologic collapse suggestive of increased RA pressure.



A  color flow and spectral Doppler study was performed and revealed:

No aortic regurgitation..

Mild mitral regurgitation.

Mitral inflow indicates normal left ventricular diastolic function. 

Trace tricuspid regurgitation.

Tricuspid  systolic velocities suggests peak right ventricular systolic pressure of 20 

mmHg.

## 2019-10-15 NOTE — CARDIOLOGY PROGRESS NOTE
Assessment/Plan


Assessment/Plan


all torp neg 


cp likey m/s as reproducible on palpation 


she has not identified any relieving or exacerbating factor  however


she is not able to ambulate much due to knee issue will con  myocardial 

perfusion imaging since pt has presented for this cp twice 


per [facility] records she has fibromyalgia an hs of multipel somati complaints 


[id number]





Objective





Last 24 Hour Vital Signs








  Date Time  Temp Pulse Resp B/P (MAP) Pulse Ox O2 Delivery O2 Flow Rate FiO2


 


10/15/19 08:09 98.4 64 18 92/64 (73) 93   


 


10/15/19 08:00  85      


 


10/15/19 04:00  56      


 


10/15/19 04:00 98.0 57 18 103/57 (72) 96   


 


10/15/19 00:00 97.5 67 18 109/61 (77) 95   


 


10/15/19 00:00  56      


 


10/14/19 21:00      Room Air  


 


10/14/19 20:00 97.1 76 18 111/55 (73) 95   


 


10/14/19 20:00  70      


 


10/14/19 16:13      Room Air  


 


10/14/19 16:12  61      


 


10/14/19 15:47 98.6  18 133/76 96 Room Air  


 


10/14/19 14:22 98.0       

















Intake and Output  


 


 10/14/19 10/15/19





 19:00 07:00


 


Intake Total 240 ml 250 ml


 


Output Total  700 ml


 


Balance 240 ml -450 ml


 


  


 


Intake Oral 240 ml 


 


Blood Product  250 ml


 


Output Urine Total  700 ml











Laboratory Tests








Test


  10/14/19


13:29 10/14/19


14:41 10/14/19


17:20 10/15/19


05:45


 


White Blood Count


  5.4 K/UL


(4.8-10.8) 


  


  6.2 K/UL


(4.8-10.8)


 


Red Blood Count


  3.63 M/UL


(4.20-5.40)  L 


  


  3.12 M/UL


(4.20-5.40)  L


 


Hemoglobin


  9.9 G/DL


(12.0-16.0)  L 


  


  8.7 G/DL


(12.0-16.0)  L


 


Hematocrit


  32.4 %


(37.0-47.0)  L 


  


  27.5 %


(37.0-47.0)  L


 


Mean Corpuscular Volume 89 FL (80-99)     88 FL (80-99)  


 


Mean Corpuscular Hemoglobin


  27.2 PG


(27.0-31.0) 


  


  27.8 PG


(27.0-31.0)


 


Mean Corpuscular Hemoglobin


Concent 30.5 G/DL


(32.0-36.0)  L 


  


  31.6 G/DL


(32.0-36.0)  L


 


Red Cell Distribution Width


  15.1 %


(11.6-14.8)  H 


  


  15.7 %


(11.6-14.8)  H


 


Platelet Count


  338 K/UL


(150-450) 


  


  278 K/UL


(150-450)


 


Mean Platelet Volume


  5.4 FL


(6.5-10.1)  L 


  


  5.8 FL


(6.5-10.1)  L


 


Neutrophils (%) (Auto)


  56.6 %


(45.0-75.0) 


  


  60.3 %


(45.0-75.0)


 


Lymphocytes (%) (Auto)


  32.1 %


(20.0-45.0) 


  


  28.9 %


(20.0-45.0)


 


Monocytes (%) (Auto)


  7.4 %


(1.0-10.0) 


  


  7.1 %


(1.0-10.0)


 


Eosinophils (%) (Auto)


  3.2 %


(0.0-3.0)  H 


  


  3.2 %


(0.0-3.0)  H


 


Basophils (%) (Auto)


  0.8 %


(0.0-2.0) 


  


  0.6 %


(0.0-2.0)


 


Prothrombin Time


  9.5 SEC


(9.30-11.50) 


  


  9.8 SEC


(9.30-11.50)


 


Prothromb Time International


Ratio 0.9 (0.9-1.1)  


  


  


  0.9 (0.9-1.1)  


 


 


Activated Partial


Thromboplast Time 29 SEC (23-33)


  


  


  29 SEC (23-33)


 


 


Sodium Level


  139 MMOL/L


(136-145) 


  


  


 


 


Potassium Level


  3.9 MMOL/L


(3.5-5.1) 


  


  


 


 


Chloride Level


  105 MMOL/L


() 


  


  


 


 


Carbon Dioxide Level


  28 MMOL/L


(21-32) 


  


  


 


 


Anion Gap


  6 mmol/L


(5-15) 


  


  


 


 


Blood Urea Nitrogen


  20 mg/dL


(7-18)  H 


  


  


 


 


Creatinine


  1.0 MG/DL


(0.55-1.30) 


  


  


 


 


Estimat Glomerular Filtration


Rate > 60 mL/min


(>60) 


  


  


 


 


Glucose Level


  100 MG/DL


() 


  


  


 


 


Calcium Level


  8.9 MG/DL


(8.5-10.1) 


  


  


 


 


Total Bilirubin


  0.3 MG/DL


(0.2-1.0) 


  


  


 


 


Aspartate Amino Transf


(AST/SGOT) 13 U/L (15-37)


L 


  


  


 


 


Alanine Aminotransferase


(ALT/SGPT) 23 U/L (12-78)


  


  


  


 


 


Alkaline Phosphatase


  97 U/L


() 


  


  


 


 


Total Creatine Kinase


  67 U/L


() 


  


  


 


 


Creatine Kinase MB


  0.5 NG/ML


(0.0-3.6) 


  


  


 


 


Creatine Kinase MB Relative


Index 0.7  


  


  


  


 


 


Troponin I


  0.000 ng/mL


(0.000-0.056) 


  0.000 ng/mL


(0.000-0.056) 0.005 ng/mL


(0.000-0.056)


 


Pro-B-Type Natriuretic Peptide


  185 pg/mL


(0-125)  H 


  


  


 


 


Total Protein


  7.3 G/DL


(6.4-8.2) 


  


  


 


 


Albumin


  3.2 G/DL


(3.4-5.0)  L 


  


  


 


 


Globulin 4.1 g/dL     


 


Albumin/Globulin Ratio


  0.8 (1.0-2.7)


L 


  


  


 


 


Triglycerides Level


  40 MG/DL


() 


  


  33 MG/DL


()


 


Cholesterol Level


  151 MG/DL (<


200) 


  


  125 MG/DL (<


200)


 


LDL Cholesterol


  71 mg/dL


(<100) 


  


  59 mg/dL


(<100)


 


HDL Cholesterol


  70 MG/DL


(40-60)  H 


  


  58 MG/DL


(40-60)


 


Cholesterol/HDL Ratio


  2.2 (3.3-4.4)


L 


  


  2.2 (3.3-4.4)


L


 


Lipase


  80 U/L


() 


  


  


 


 


Urine Opiates Screen


  


  Negative


(NEGATIVE) 


  


 


 


Urine Barbiturates Screen


  


  Negative


(NEGATIVE) 


  


 


 


Phencyclidine (PCP) Screen


  


  Negative


(NEGATIVE) 


  


 


 


Urine Amphetamines Screen


  


  Negative


(NEGATIVE) 


  


 


 


Urine Benzodiazepines Screen


  


  Negative


(NEGATIVE) 


  


 


 


Urine Cocaine Screen


  


  Negative


(NEGATIVE) 


  


 


 


Urine Marijuana (THC) Screen


  


  Negative


(NEGATIVE) 


  


 


 


C-Reactive Protein,


Quantitative 


  


  


  < 0.4 mg/dL


(0.00-0.90)


 


Thyroid Stimulating Hormone


(TSH) 


  


  


  2.655 uiU/mL


(0.358-3.740)

















Fili De La Fuente MD Oct 15, 2019 13:29

## 2019-10-15 NOTE — NUR
NURSE NOTES:

Pt burned skin while drinking coffee, helped pt cleaned herself and assisted her by taking 
off all the items she had in bed.  Pt is ok now but was very anxious.

## 2019-10-15 NOTE — INTERNAL MED PROGRESS NOTE
Subjective


Date of Service:  Oct 15, 2019


Physician Name


Ludwin Fulton


Attending Physician


Medhat Vogt MD





Current Medications








 Medications


  (Trade)  Dose


 Ordered  Sig/Tapan


 Route


 PRN Reason  Start Time


 Stop Time Status Last Admin


Dose Admin


 


 Acetaminophen


  (Tylenol)  650 mg  Q4H  PRN


 ORAL


 FEVER  10/14/19 15:45


 11/13/19 15:44   


 


 


 Acetaminophen/


 Hydrocodone Bitart


  (Norco 5/325)  1 tab  Q6H  PRN


 ORAL


 pain 4-10  10/14/19 17:00


 10/21/19 16:59  10/15/19 06:29


 


 


 Albuterol/


 Ipratropium


  (Albuterol/


 Ipratropium)  3 ml  Q4H  PRN


 HHN


 Shortness of Breath  10/14/19 15:45


 10/19/19 15:44   


 


 


 Aspirin


  (ASA)  162 mg  DAILY


 ORAL


   10/15/19 09:00


 11/14/19 08:59  10/15/19 08:53


 


 


 Bupropion HCl


  (Wellbutrin XL)  150 mg  DAILY


 ORAL


   10/16/19 09:00


 11/15/19 08:59   


 


 


 Diltiazem HCl


  (Cardizem)  10 mg  Q1H  PRN


 IV


 heart rate more than 120,   10/14/19 15:45


 11/13/19 15:44   


 


 


 Diphenhydramine


 HCl


  (Benadryl)  25 mg  Q6H  PRN


 ORAL


 Itching  10/14/19 17:00


 11/13/19 16:59   


 


 


 Enalaprilat


  (Vasotec)  2.5 mg  Q6H  PRN


 IV


 sbp more than 160  10/14/19 15:45


 11/13/19 15:44   


 


 


 Heparin Sodium


  (Porcine)


  (Heparin 5000


 units/ml)  5,000 units  EVERY 12  HOURS


 SUBQ


   10/14/19 21:00


 11/13/19 20:59  10/15/19 08:55


 


 


 Morphine Sulfate


  (Morphine


 Sulfate)  2 mg  Q4H  PRN


 IVP


 For Pain  10/15/19 11:30


 10/22/19 11:29  10/15/19 13:05


 


 


 Nitroglycerin


  (Ntg)  0.4 mg  Q5M  PRN


 SL


 Prn Chest Pain  10/14/19 15:45


 11/13/19 15:44   


 


 


 Ondansetron HCl


  (Zofran)  4 mg  Q6H  PRN


 IVP


 Nausea & Vomiting  10/14/19 15:45


 11/13/19 15:44  10/15/19 13:06


 


 


 Polyethylene


 Glycol


  (Miralax)  17 gm  DAILYPRN  PRN


 ORAL


 Constipation  10/14/19 15:45


 11/13/19 15:44   


 


 


 Quetiapine


 Fumarate


  (SEROquel)  150 mg  BEDTIME


 ORAL


   10/15/19 21:00


 11/14/19 20:59   


 


 


 Regadenoson


  (Lexiscan)  0.4 mg  ONCE  PRN


 IV


 CARDIOLOGY  10/15/19 13:30


 10/16/19 23:59   


 


 


 Temazepam


  (Restoril)  15 mg  HSPRN  PRN


 ORAL


 Insomnia  10/14/19 15:45


 10/21/19 15:44  10/15/19 00:34


 


 


 Trazodone HCl


  (Desyrel)  300 mg  BEDTIME


 ORAL


   10/14/19 21:00


 11/13/19 20:59  10/14/19 21:32


 








Allergies:  


Coded Allergies:  


     LAMOTRIGINE (Verified  Allergy, Mild, Hives, 10/5/18)


     PENICILLINS (Verified  Allergy, Mild, Hives, 10/5/18)


     ERYTHROMYCIN BASE (Unverified  Allergy, Unknown, Rash, 10/5/18)


     PREGABALIN (Verified  Allergy, Unknown, 10/5/18)


 SWELLING


     SERTRALINE (Verified  Allergy, Unknown, 10/5/18)


     TRAMADOL HCL (Verified  Allergy, Unknown, 2/25/15)


 VOMIT


ROS Limited/Unobtainable:  No


Constitutional:  Reports: no symptoms


HEENT:  Reports: no symptoms


Cardiovascular:  Reports: chest pain


Respiratory:  Reports: no symptoms


Gastrointestinal/Abdominal:  Reports: no symptoms


Genitourinary:  Reports: no symptoms


Neurologic/Psychiatric:  Reports: no symptoms


Subjective


59 YO F admitted with chest pain, left knee pain and left ankle pain.  Cover 

for Int Sergio-Dr Vogt





Objective





Last Vital Signs








  Date Time  Temp Pulse Resp B/P (MAP) Pulse Ox O2 Delivery O2 Flow Rate FiO2


 


10/15/19 12:00  63      


 


10/15/19 12:00 98.3  20 101/50 (67) 95   


 


10/15/19 09:00      Room Air  








General Appearance:  WD/WN, no apparent distress, alert


EENT:  PERRL/EOMI, normal ENT inspection


Neck:  non-tender, normal alignment, supple, normal inspection


Cardiovascular:  normal peripheral pulses, normal rate, regular rhythm, no 

gallop/murmur, no JVD


Respiratory/Chest:  chest wall non-tender, lungs clear, normal breath sounds, 

no respiratory distress, no accessory muscle use


Abdomen:  normal bowel sounds, non tender, soft, no organomegaly, no mass


Extremities:  normal range of motion, non-tender


Neurologic:  CNs II-XII grossly normal, no motor/sensory deficits


Skin:  normal pigmentation, warm/dry





Laboratory Tests








Test


  10/15/19


05:45


 


White Blood Count


  6.2 K/UL


(4.8-10.8)


 


Red Blood Count


  3.12 M/UL


(4.20-5.40)  L


 


Hemoglobin


  8.7 G/DL


(12.0-16.0)  L


 


Hematocrit


  27.5 %


(37.0-47.0)  L


 


Mean Corpuscular Volume 88 FL (80-99)  


 


Mean Corpuscular Hemoglobin


  27.8 PG


(27.0-31.0)


 


Mean Corpuscular Hemoglobin


Concent 31.6 G/DL


(32.0-36.0)  L


 


Red Cell Distribution Width


  15.7 %


(11.6-14.8)  H


 


Platelet Count


  278 K/UL


(150-450)


 


Mean Platelet Volume


  5.8 FL


(6.5-10.1)  L


 


Neutrophils (%) (Auto)


  60.3 %


(45.0-75.0)


 


Lymphocytes (%) (Auto)


  28.9 %


(20.0-45.0)


 


Monocytes (%) (Auto)


  7.1 %


(1.0-10.0)


 


Eosinophils (%) (Auto)


  3.2 %


(0.0-3.0)  H


 


Basophils (%) (Auto)


  0.6 %


(0.0-2.0)


 


Prothrombin Time


  9.8 SEC


(9.30-11.50)


 


Prothromb Time International


Ratio 0.9 (0.9-1.1)  


 


 


Activated Partial


Thromboplast Time 29 SEC (23-33)


 


 


Troponin I


  0.005 ng/mL


(0.000-0.056)


 


C-Reactive Protein,


Quantitative < 0.4 mg/dL


(0.00-0.90)


 


Triglycerides Level


  33 MG/DL


()


 


Cholesterol Level


  125 MG/DL (<


200)


 


LDL Cholesterol


  59 mg/dL


(<100)


 


HDL Cholesterol


  58 MG/DL


(40-60)


 


Cholesterol/HDL Ratio


  2.2 (3.3-4.4)


L


 


Thyroid Stimulating Hormone


(TSH) 2.655 uiU/mL


(0.358-3.740)

















Intake and Output  


 


 10/14/19 10/15/19





 19:00 07:00


 


Intake Total 240 ml 250 ml


 


Output Total  700 ml


 


Balance 240 ml -450 ml


 


  


 


Intake Oral 240 ml 


 


Blood Product  250 ml


 


Output Urine Total  700 ml











Assessment/Plan


Problem List:  


(1) ACS (acute coronary syndrome)


Assessment & Plan:  See cardiology note=Dr De La Fuente.  Await cardiac stress test





(2) Left knee pain


Assessment & Plan:  Await xray





(3) Left ankle pain


Assessment & Plan:  await xray





(4) Hypertension


Assessment & Plan:  Continue valsartan





(5) Bipolar depression


Assessment & Plan:  See psych note.  Continue cymbalta





(6) DJD (degenerative joint disease) of knee


(7) Fibromyalgia


Status:  not improved











Ludwin Fulton MD Oct 15, 2019 17:41

## 2019-10-15 NOTE — CONSULTATION
History of Present Illness


General


Date patient seen:  Oct 15, 2019


Chief Complaint:  Chest Pain





Present Illness


HPI





58-year-old female with a  history of HTN, depression,  obesity, anxiety, 

osteoarthritis of both knees presented to ER  with chest pain, retrosternal, no 

aggravating, intermittent lasting minutes, she feels short of breath feels 

tightness in her chest.    Patient was requesting Dilaudid/morphine for pain.  

No diaphoresis no nausea no vomiting. Because of her high risk, she is admitted 

to telemetry for further treatment.


Allergies:  


Coded Allergies:  


     LAMOTRIGINE (Verified  Allergy, Mild, Hives, 10/5/18)


     PENICILLINS (Verified  Allergy, Mild, Hives, 10/5/18)


     ERYTHROMYCIN BASE (Unverified  Allergy, Unknown, Rash, 10/5/18)


     PREGABALIN (Verified  Allergy, Unknown, 10/5/18)


 SWELLING


     SERTRALINE (Verified  Allergy, Unknown, 10/5/18)


     TRAMADOL HCL (Verified  Allergy, Unknown, 2/25/15)


 VOMIT





Medication History


Scheduled


Acetaminophen* (Tylenol Extra Strength*), 1,000 MG ORAL Q6H


Diclofenac Sodium (Voltaren), 2 GM TP TID


Duloxetine Hcl* (Cymbalta*), 30 MG PO BEDTIME, (Reported)


Duloxetine Hcl* (Cymbalta*), 60 MG PO DAILY, (Reported)


Solifenacin Succinate (Vesicare*), 10 MG ORAL DAILY, (Reported)


Trazodone* (Trazodone*), 300 MG ORAL BEDTIME, (Reported)


Valsartan (Diovan), 160 MG ORAL BID, (Reported)





Scheduled PRN


Lorazepam* (Ativan*), 1 MG ORAL Q8HR PRN for For Pain, (Reported)


Ondansetron (Zofran), 4 MG ORAL Q6H PRN for Nausea & Vomiting


Zolpidem Tartrate* (Ambien*), 10 MG ORAL HS PRN for Insomnia, (Reported)





Miscellaneous Medications


Unable to Obtain Medications (Unable To Obtain Meds), (Reported)





Patient History


Healthcare decision maker





Resuscitation status


Full Code


Advanced Directive on File


No





Past Medical/Surgical History


Past Medical/Surgical History:  


(1) Obesity (BMI 35.0-39.9 without comorbidity)


(2) Osteoarthritis of left knee


(3) Bipolar 1 disorder, depressed


(4) HTN (hypertension)


(5) Fibromyalgia





Review of Systems


All Other Systems:  negative except mentioned in HPI





Physical Exam


General Appearance:  WD/WN, no apparent distress, alert


Lines, tubes and drains:  peripheral, central line


HEENT:  atraumatic


Neck:  non-tender, normal alignment


Respiratory/Chest:  chest wall non-tender, lungs clear, normal breath sounds


Cardiovascular/Chest:  normal peripheral pulses, normal rate


Abdomen:  normal bowel sounds, non tender


Genitourinary/Rectal:  normal genital exam, normal rectal exam, normal prostate 

exam


Extremities:  normal range of motion, non-tender, normal inspection


Neurologic:  CNs II-XII grossly normal





Last 24 Hour Vital Signs








  Date Time  Temp Pulse Resp B/P (MAP) Pulse Ox O2 Delivery O2 Flow Rate FiO2


 


10/15/19 08:09 98.4 64 18 92/64 (73) 93   


 


10/15/19 04:00  56      


 


10/15/19 04:00 98.0 57 18 103/57 (72) 96   


 


10/15/19 00:00 97.5 67 18 109/61 (77) 95   


 


10/15/19 00:00  56      


 


10/14/19 21:00      Room Air  


 


10/14/19 20:00 97.1 76 18 111/55 (73) 95   


 


10/14/19 20:00  70      


 


10/14/19 16:13      Room Air  


 


10/14/19 16:12  61      


 


10/14/19 15:47 98.6  18 133/76 96 Room Air  


 


10/14/19 14:22 98.0       


 


10/14/19 13:15  68 15   Room Air  


 


10/14/19 13:15 98.8 66 18 138/81 96 Room Air  


 


10/14/19 13:02 98.1 61 15 145/82 (103) 96 Room Air  

















Intake and Output  


 


 10/14/19 10/15/19





 19:00 07:00


 


Intake Total 240 ml 250 ml


 


Output Total  700 ml


 


Balance 240 ml -450 ml


 


  


 


Intake Oral 240 ml 


 


Blood Product  250 ml


 


Output Urine Total  700 ml











Laboratory Tests








Test


  10/14/19


13:29 10/14/19


14:41 10/14/19


17:20 10/15/19


05:45


 


White Blood Count


  5.4 K/UL


(4.8-10.8) 


  


  6.2 K/UL


(4.8-10.8)


 


Red Blood Count


  3.63 M/UL


(4.20-5.40)  L 


  


  3.12 M/UL


(4.20-5.40)  L


 


Hemoglobin


  9.9 G/DL


(12.0-16.0)  L 


  


  8.7 G/DL


(12.0-16.0)  L


 


Hematocrit


  32.4 %


(37.0-47.0)  L 


  


  27.5 %


(37.0-47.0)  L


 


Mean Corpuscular Volume 89 FL (80-99)     88 FL (80-99)  


 


Mean Corpuscular Hemoglobin


  27.2 PG


(27.0-31.0) 


  


  27.8 PG


(27.0-31.0)


 


Mean Corpuscular Hemoglobin


Concent 30.5 G/DL


(32.0-36.0)  L 


  


  31.6 G/DL


(32.0-36.0)  L


 


Red Cell Distribution Width


  15.1 %


(11.6-14.8)  H 


  


  15.7 %


(11.6-14.8)  H


 


Platelet Count


  338 K/UL


(150-450) 


  


  278 K/UL


(150-450)


 


Mean Platelet Volume


  5.4 FL


(6.5-10.1)  L 


  


  5.8 FL


(6.5-10.1)  L


 


Neutrophils (%) (Auto)


  56.6 %


(45.0-75.0) 


  


  60.3 %


(45.0-75.0)


 


Lymphocytes (%) (Auto)


  32.1 %


(20.0-45.0) 


  


  28.9 %


(20.0-45.0)


 


Monocytes (%) (Auto)


  7.4 %


(1.0-10.0) 


  


  7.1 %


(1.0-10.0)


 


Eosinophils (%) (Auto)


  3.2 %


(0.0-3.0)  H 


  


  3.2 %


(0.0-3.0)  H


 


Basophils (%) (Auto)


  0.8 %


(0.0-2.0) 


  


  0.6 %


(0.0-2.0)


 


Prothrombin Time


  9.5 SEC


(9.30-11.50) 


  


  9.8 SEC


(9.30-11.50)


 


Prothromb Time International


Ratio 0.9 (0.9-1.1)  


  


  


  0.9 (0.9-1.1)  


 


 


Activated Partial


Thromboplast Time 29 SEC (23-33)


  


  


  29 SEC (23-33)


 


 


Sodium Level


  139 MMOL/L


(136-145) 


  


  


 


 


Potassium Level


  3.9 MMOL/L


(3.5-5.1) 


  


  


 


 


Chloride Level


  105 MMOL/L


() 


  


  


 


 


Carbon Dioxide Level


  28 MMOL/L


(21-32) 


  


  


 


 


Anion Gap


  6 mmol/L


(5-15) 


  


  


 


 


Blood Urea Nitrogen


  20 mg/dL


(7-18)  H 


  


  


 


 


Creatinine


  1.0 MG/DL


(0.55-1.30) 


  


  


 


 


Estimat Glomerular Filtration


Rate > 60 mL/min


(>60) 


  


  


 


 


Glucose Level


  100 MG/DL


() 


  


  


 


 


Calcium Level


  8.9 MG/DL


(8.5-10.1) 


  


  


 


 


Total Bilirubin


  0.3 MG/DL


(0.2-1.0) 


  


  


 


 


Aspartate Amino Transf


(AST/SGOT) 13 U/L (15-37)


L 


  


  


 


 


Alanine Aminotransferase


(ALT/SGPT) 23 U/L (12-78)


  


  


  


 


 


Alkaline Phosphatase


  97 U/L


() 


  


  


 


 


Total Creatine Kinase


  67 U/L


() 


  


  


 


 


Creatine Kinase MB


  0.5 NG/ML


(0.0-3.6) 


  


  


 


 


Creatine Kinase MB Relative


Index 0.7  


  


  


  


 


 


Troponin I


  0.000 ng/mL


(0.000-0.056) 


  0.000 ng/mL


(0.000-0.056) 0.005 ng/mL


(0.000-0.056)


 


Pro-B-Type Natriuretic Peptide


  185 pg/mL


(0-125)  H 


  


  


 


 


Total Protein


  7.3 G/DL


(6.4-8.2) 


  


  


 


 


Albumin


  3.2 G/DL


(3.4-5.0)  L 


  


  


 


 


Globulin 4.1 g/dL     


 


Albumin/Globulin Ratio


  0.8 (1.0-2.7)


L 


  


  


 


 


Triglycerides Level


  40 MG/DL


() 


  


  33 MG/DL


()


 


Cholesterol Level


  151 MG/DL (<


200) 


  


  125 MG/DL (<


200)


 


LDL Cholesterol


  71 mg/dL


(<100) 


  


  59 mg/dL


(<100)


 


HDL Cholesterol


  70 MG/DL


(40-60)  H 


  


  58 MG/DL


(40-60)


 


Cholesterol/HDL Ratio


  2.2 (3.3-4.4)


L 


  


  2.2 (3.3-4.4)


L


 


Lipase


  80 U/L


() 


  


  


 


 


Urine Opiates Screen


  


  Negative


(NEGATIVE) 


  


 


 


Urine Barbiturates Screen


  


  Negative


(NEGATIVE) 


  


 


 


Phencyclidine (PCP) Screen


  


  Negative


(NEGATIVE) 


  


 


 


Urine Amphetamines Screen


  


  Negative


(NEGATIVE) 


  


 


 


Urine Benzodiazepines Screen


  


  Negative


(NEGATIVE) 


  


 


 


Urine Cocaine Screen


  


  Negative


(NEGATIVE) 


  


 


 


Urine Marijuana (THC) Screen


  


  Negative


(NEGATIVE) 


  


 


 


C-Reactive Protein,


Quantitative 


  


  


  < 0.4 mg/dL


(0.00-0.90)


 


Thyroid Stimulating Hormone


(TSH) 


  


  


  2.655 uiU/mL


(0.358-3.740)








Height (Feet):  5


Height (Inches):  7.00


Weight (Pounds):  275


Medications





Current Medications








 Medications


  (Trade)  Dose


 Ordered  Sig/Tapan


 Route


 PRN Reason  Start Time


 Stop Time Status Last Admin


Dose Admin


 


 Acetaminophen


  (Tylenol)  650 mg  Q4H  PRN


 ORAL


 FEVER  10/14/19 15:45


 11/13/19 15:44   


 


 


 Acetaminophen/


 Hydrocodone Bitart


  (Norco 5/325)  1 tab  Q6H  PRN


 ORAL


 pain 4-10  10/14/19 17:00


 10/21/19 16:59  10/15/19 06:29


 


 


 Albuterol/


 Ipratropium


  (Albuterol/


 Ipratropium)  3 ml  Q4H  PRN


 HHN


 Shortness of Breath  10/14/19 15:45


 10/19/19 15:44   


 


 


 Aspirin


  (ASA)  162 mg  DAILY


 ORAL


   10/15/19 09:00


 11/14/19 08:59  10/15/19 08:53


 


 


 Diltiazem HCl


  (Cardizem)  10 mg  Q1H  PRN


 IV


 heart rate more than 120,   10/14/19 15:45


 11/13/19 15:44   


 


 


 Diphenhydramine


 HCl


  (Benadryl)  25 mg  Q6H  PRN


 ORAL


 Itching  10/14/19 17:00


 11/13/19 16:59   


 


 


 Duloxetine HCl


  (Cymbalta)  30 mg  BEDTIME


 ORAL


   10/14/19 21:00


 11/13/19 20:59  10/14/19 21:33


 


 


 Enalaprilat


  (Vasotec)  2.5 mg  Q6H  PRN


 IV


 sbp more than 160  10/14/19 15:45


 11/13/19 15:44   


 


 


 Heparin Sodium


  (Porcine)


  (Heparin 5000


 units/ml)  5,000 units  EVERY 12  HOURS


 SUBQ


   10/14/19 21:00


 11/13/19 20:59  10/15/19 08:55


 


 


 Morphine Sulfate


  (Morphine


 Sulfate)  2 mg  Q4H  PRN


 IVP


 For Pain  10/15/19 11:30


 10/22/19 11:29   


 


 


 Nitroglycerin


  (Ntg)  0.4 mg  Q5M  PRN


 SL


 Prn Chest Pain  10/14/19 15:45


 11/13/19 15:44   


 


 


 Ondansetron HCl


  (Zofran)  4 mg  Q6H  PRN


 IVP


 Nausea & Vomiting  10/14/19 15:45


 11/13/19 15:44   


 


 


 Polyethylene


 Glycol


  (Miralax)  17 gm  DAILYPRN  PRN


 ORAL


 Constipation  10/14/19 15:45


 11/13/19 15:44   


 


 


 Temazepam


  (Restoril)  15 mg  HSPRN  PRN


 ORAL


 Insomnia  10/14/19 15:45


 10/21/19 15:44  10/15/19 00:34


 


 


 Trazodone HCl


  (Desyrel)  300 mg  BEDTIME


 ORAL


   10/14/19 21:00


 11/13/19 20:59  10/14/19 21:32


 











Assessment/Plan


Problem List:  


(1) Acute coronary syndrome


ICD Codes:  I24.9 - Acute ischemic heart disease, unspecified


SNOMED:  186853781


(2) Intractable neuropathic pain of left knee


ICD Codes:  G57.92 - Unspecified mononeuropathy of left lower limb


SNOMED:  894239561


(3) HTN (hypertension)


ICD Codes:  I10 - Essential (primary) hypertension


SNOMED:  76963230


(4) Bipolar 1 disorder, depressed


ICD Codes:  F31.9 - Bipolar disorder, unspecified


SNOMED:  76945248


(5) Obesity (BMI 35.0-39.9 without comorbidity)


ICD Codes:  E66.9 - Obesity, unspecified


SNOMED:  153601334, 761870736


(6) Osteoarthritis of left knee


ICD Codes:  M17.12 - Unilateral primary osteoarthritis, left knee


SNOMED:  435608792263392


Assessment/Plan:


serial ekg, troponin, echo


cardiology to see


symptomatic treatment


watch BP


pain management


nutrition evaluation. 


dvt prophylaxis.











Nilda Arellano MD Oct 15, 2019 11:32

## 2019-10-16 VITALS — DIASTOLIC BLOOD PRESSURE: 52 MMHG | SYSTOLIC BLOOD PRESSURE: 108 MMHG

## 2019-10-16 VITALS — DIASTOLIC BLOOD PRESSURE: 68 MMHG | SYSTOLIC BLOOD PRESSURE: 109 MMHG

## 2019-10-16 VITALS — DIASTOLIC BLOOD PRESSURE: 48 MMHG | SYSTOLIC BLOOD PRESSURE: 99 MMHG

## 2019-10-16 VITALS — DIASTOLIC BLOOD PRESSURE: 64 MMHG | SYSTOLIC BLOOD PRESSURE: 109 MMHG

## 2019-10-16 VITALS — SYSTOLIC BLOOD PRESSURE: 132 MMHG | DIASTOLIC BLOOD PRESSURE: 77 MMHG

## 2019-10-16 VITALS — SYSTOLIC BLOOD PRESSURE: 120 MMHG | DIASTOLIC BLOOD PRESSURE: 63 MMHG

## 2019-10-16 LAB
ADD MANUAL DIFF: NO
ANION GAP SERPL CALC-SCNC: 7 MMOL/L (ref 5–15)
BASOPHILS NFR BLD AUTO: 0.7 % (ref 0–2)
BUN SERPL-MCNC: 16 MG/DL (ref 7–18)
CALCIUM SERPL-MCNC: 8.1 MG/DL (ref 8.5–10.1)
CHLORIDE SERPL-SCNC: 107 MMOL/L (ref 98–107)
CO2 SERPL-SCNC: 26 MMOL/L (ref 21–32)
CREAT SERPL-MCNC: 1.1 MG/DL (ref 0.55–1.3)
EOSINOPHIL NFR BLD AUTO: 3.6 % (ref 0–3)
ERYTHROCYTE [DISTWIDTH] IN BLOOD BY AUTOMATED COUNT: 15 % (ref 11.6–14.8)
HCT VFR BLD CALC: 28.1 % (ref 37–47)
HGB BLD-MCNC: 8.9 G/DL (ref 12–16)
LYMPHOCYTES NFR BLD AUTO: 31.3 % (ref 20–45)
MCV RBC AUTO: 89 FL (ref 80–99)
MONOCYTES NFR BLD AUTO: 8.9 % (ref 1–10)
NEUTROPHILS NFR BLD AUTO: 55.5 % (ref 45–75)
PLATELET # BLD: 293 K/UL (ref 150–450)
POTASSIUM SERPL-SCNC: 4.4 MMOL/L (ref 3.5–5.1)
RBC # BLD AUTO: 3.14 M/UL (ref 4.2–5.4)
SODIUM SERPL-SCNC: 139 MMOL/L (ref 136–145)
WBC # BLD AUTO: 6 K/UL (ref 4.8–10.8)

## 2019-10-16 RX ADMIN — MORPHINE SULFATE PRN MG: 2 INJECTION, SOLUTION INTRAMUSCULAR; INTRAVENOUS at 04:43

## 2019-10-16 RX ADMIN — MORPHINE SULFATE PRN MG: 2 INJECTION, SOLUTION INTRAMUSCULAR; INTRAVENOUS at 14:39

## 2019-10-16 RX ADMIN — MORPHINE SULFATE PRN MG: 2 INJECTION, SOLUTION INTRAMUSCULAR; INTRAVENOUS at 18:31

## 2019-10-16 RX ADMIN — HEPARIN SODIUM SCH UNITS: 5000 INJECTION INTRAVENOUS; SUBCUTANEOUS at 20:56

## 2019-10-16 RX ADMIN — MORPHINE SULFATE PRN MG: 2 INJECTION, SOLUTION INTRAMUSCULAR; INTRAVENOUS at 00:36

## 2019-10-16 RX ADMIN — HEPARIN SODIUM SCH UNITS: 5000 INJECTION INTRAVENOUS; SUBCUTANEOUS at 09:15

## 2019-10-16 RX ADMIN — MORPHINE SULFATE PRN MG: 2 INJECTION, SOLUTION INTRAMUSCULAR; INTRAVENOUS at 09:13

## 2019-10-16 RX ADMIN — CLOTRIMAZOLE AND BETAMETHASONE DIPROPIONATE SCH APPLIC: 10; .5 CREAM TOPICAL at 09:18

## 2019-10-16 RX ADMIN — ASPIRIN 81 MG SCH MG: 81 TABLET ORAL at 09:12

## 2019-10-16 RX ADMIN — CLOTRIMAZOLE AND BETAMETHASONE DIPROPIONATE SCH APPLIC: 10; .5 CREAM TOPICAL at 17:14

## 2019-10-16 NOTE — NUR
NURSE NOTES:

Received report from THALIA Bullard. Pt in bed, awake, talkative, no apparent distress noted, 
pt asking for next pain medication time, notified pt meds are due at 0843, discussed plan of 
care including Xrays and Stress Test scheduled. Bed in lowest position, call light within 
reach.

## 2019-10-16 NOTE — CONSULTATION
DATE OF CONSULTATION:  10/15/2019

HISTORY OF PRESENT ILLNESS:  The patient is a 58-year-old black female with

a history of depression and anxiety who I am well familiar with from

nursing home as well as previous encounter at the hospital admitted to the

hospital for chest pain.  The patient is presenting with anxiety,

depressed mood, has had thoughts of killing himself.  Three days ago, the

patient presents with severe psychosocial stressors, stated that Cymbalta

nor does Seroquel working.  The patient is hopeless and helpless.  The

patient is having poor memory, was engaged during the evaluation, and is a

good historian.  Denies any suicidal or homicidal ideation.



PAST PSYCHIATRIC HISTORY:  Depression and anxiety disorder.



PAST MEDICAL HISTORY:  Significant for hypertension, acute coronary

syndrome, osteoarthritis, obesity, DJD, and ______ .



ALLERGIES:  Erythromycin, Lamictal, penicillin, pregabalin, Zoloft,

tramadol.



SUBSTANCE USE HISTORY:  Significant for history of meth abuse.  Urine

toxicology is negative for any drugs or alcohol.  She is denying any

current use however she is requesting pain medications including Dilaudid

and  morphine.  She has an extensive history of drug use specifically

opiates and methamphetamine.



MENTAL STATUS EXAMINATION:  The patient is alert and oriented times self,

place, and situation.  Mood is depressed.  Affect is constricted.

Congruent with mood.  Thought process is linear and goal oriented.

Thought content, no suicidal or homicidal ideations.  Cognition is intact.

Insight and judgment is fair.



ASSESSMENT:

Axis I  Major depressive disorder.

Methamphetamine dependence.

Opiate _____

Axis II  Deferred.

Axis III  As above.

Axis IV  Moderate.

Axis V  50



PLAN:

1. We will discontinue Cymbalta.

2. Start the patient on Wellbutrin.

3. Continue trazodone.

4. Continue Seroquel.

5. We will continue to follow and readjust the medications.









  ______________________________________________

  Cassandra Colon M.D.





DR:  Teodoro

D:  10/15/2019 23:27

T:  10/15/2019 23:43

JOB#:  [id number]

CC:

## 2019-10-16 NOTE — DIAGNOSTIC IMAGING REPORT
Indications: Chest pain

 

Technique: Single day single isotope protocol utilized. Initially, resting images

obtained using IV administration 10.9 millicuries 99M technetium Myoview.

Subsequently, patient underwent  lexiscan stress testing. See cardiology report for

details. During Lexiscan infusion, IV administration 32.5 mCi 99 M technetium

Myoview. SPECT and planar images obtained. SPECT images gated to 8 phases of the

cardiac cycle were also obtained, and reformatted into cine images for evaluation of

ejection fraction.

 

Comparison: none

 

Findings: Per cardiology report, patient experienced no chest pain. Per cardiology

report, resting EKG demonstrates sinus bradycardia. No ST-T wave changes noted during

the infusion.  Imaging demonstrates normal poststress perfusion. No fixed nor

reversible perfusion defects demonstrated. Normal cardiac chamber size. Calculated

post stress ejection fraction 83%. No focal wall motion abnormality

 

Impression: Nonischemic clinical response to pharmacologic stress, per cardiology

report

 

Nonischemic electrocardiographic response to pharmacologic stress, per cardiology

report

 

No imaging findings to suggest ischemia, at level of stress achieved.

 

Calculated post stress ejection fraction greater than 70%

## 2019-10-16 NOTE — DIAGNOSTIC IMAGING REPORT
Indication: Knee pain, limited movement

 

Technique: 3 views of the left knee

 

Comparison: None

 

Findings: No suprapatellar effusion. No acute fractures. No dislocations. Joint

spaces are preserved.

 

Impression: Negative

## 2019-10-16 NOTE — NUR
NURSE NOTES:

RN discussed with Antonio, in Cardiology time of Stress test and if pt can eat and have decaf 
coffee. Antonio stated pt can have light breakfast as test will not be until 1400, but no 
coffee or decaf coffee. Antonio also stated that pt's Hgb is 8.7 as of 10/14/19 and MD needs to 
be notified if test will be continued. RN will contact MD about Hgb and discussed with pt no 
decaf coffee and provided pt with light breakfast.

## 2019-10-16 NOTE — DIAGNOSTIC IMAGING REPORT
Indication: Left ankle pain

 

Technique: 3 views of the left ankle

 

Comparison: None 

 

Findings: Small plantar calcaneal spurs are demonstrated. No acute fractures. No

dislocations. The joint spaces are preserved

 

Impression: Negative

## 2019-10-16 NOTE — NUR
NURSE NOTES:

Pt is in bed, awake and verbal. No acute distress noted. Pt states that she has left ankle 
pain when she walks, pt can ambulate with walker. Pain medication will be given as ordered 
PRN. Bed locked low in position,side rails up and call light within reach. Pt will be 
monitored. 



Trainee, Dottie GonzalezRN will be assisting with the care of this patient.

## 2019-10-16 NOTE — NUR
NURSE NOTES:

Dr. Reddy here for [place]. 1425



1426: Dr. Reddy explaining procedure to pt. NM tech starting to inject, start of procedure.



1427: injection complete, pt states she feels "nausea"



1428: /51 HR66



1429: pt states she "feels okay"



1430: , /54, test complete



1431: PT states she needs pain medication and zofran

## 2019-10-16 NOTE — HISTORY AND PHYSICAL REPORT
DATE OF ADMISSION:  10/14/2019

CHIEF COMPLAINT:  The patient is a 58-year-old  female, who

presents with a chief complaint of chest pain.



HISTORY OF PRESENT ILLNESS:  The patient has multiple complaints.  The

patient was seen by Dr. Medhat Vogt on October 14, 2019.  The patient was

complaining of chest pain.  The patient states chest pain is substernal.

Chest pain radiates to the right side of the chest.  The patient also

complains of palpitations.  The patient states chest pain began

approximately 1-1/2 weeks ago.  It is worse at night when she is lying

flat.  The patient has been unable to sleep more than 2 or 3 hours per

night.  The patient also complains of left knee and left ankle pain.  The

patient states her left knee "gives out."  The patient is status post

arthroscopic surgery of the left knee in July 2018 by Dr. Jimmie Fletcher.



The patient presented to [facility] emergency room from Dr. Vogt's office.

The patient was admitted for chest pain to rule out acute coronary

syndrome.



REVIEW OF SYSTEMS:  CONSTITUTIONAL:  The patient denies weight loss or

weight gain.  The patient denies fevers or chills.  HEENT:  The patient

denies ear or throat pain.  The patient denies headache.  CARDIOVASCULAR:

The patient complains of chest pain and palpitations as above.  CHEST:

The patient denies wheeze or shortness of breath.  ABDOMINAL:  The patient

denies nausea, vomiting, diarrhea, or constipation.  GENITOURINARY:  The

patient denies dysuria or increased frequency of urination.

NEUROMUSCULAR:  The patient denies seizures or generalized weakness.



PAST MEDICAL HISTORY:  Significant for:



1. Hypertension.

2. Depression.

3. Degenerative joint disease of bilateral knees.

4. Fibromyalgia.



PAST SURGICAL HISTORY:  Significant for:



1. Right knee surgery x2.

2. Left knee arthroscopic meniscectomy in July of 2018.

3. Total abdominal hysterectomy.



CURRENT MEDICATIONS:

1. Voltaren gel applied 3 times daily p.r.n.

2. Cymbalta 30 mg p.o. at bedtime.

3. Cymbalta 60 mg p.o. q.a.m.

4. Ativan 1 mg p.o. q.8 hours p.r.n.

5. Zofran 4 mg p.o. q.6 hours p.r.n.

6. VESIcare 10 mg p.o. daily.

7. Trazodone 300 mg p.o. at bedtime.

8. Valsartan 160 mg p.o. twice daily.

9. Ambien 10 mg p.o. at bedtime.



ALLERGIES:  Erythromycin, lamotrigine, penicillin, Lyrica, Zoloft, and

tramadol.



SOCIAL HISTORY:  The patient is single and lives with friends.  The patient

denies tobacco or alcohol use.



PHYSICAL EXAMINATION:

VITAL SIGNS:  Temperature 98.1, respirations 15, pulse 61, and blood

pressure 145/82.

GENERAL:  The patient is a well-developed and well-nourished slightly obese

 female, in no apparent distress.

HEENT:  Eyes, pupils are equal and responsive to light and accommodation.

Extraocular movements are intact.

NECK:  Supple without lymphadenopathy.

CHEST:  Lungs are clear to auscultation bilaterally without wheezes or

rales.

CARDIOVASCULAR:   Regular rhythm and rate.  S1 and S2 are normal without

murmurs, rubs, or gallops.

ABDOMEN:  Soft and nondistended with positive bowel sounds.  No evidence of

hepatosplenomegaly.  Currently, no rebound or guarding noted.

EXTREMITIES:  Negative for clubbing, cyanosis, or edema.

RECTAL/GENITAL:  Refused.

NEUROLOGIC:  Cranial nerves II through XII are grossly intact without focal

deficits.  Motor strength is 5/5 bilaterally.  Deep tendon reflexes are 2+

plantar.



LABORATORY STUDIES:  WBC 5.4, hemoglobin 9.9, hematocrit 32.4, and

platelets 338,000.  Sodium 139, potassium 3.9, chloride 105, CO2 28, BUN

20, creatinine 1.0, and glucose 100.  Troponin 0.0.  A chest x-ray is

reported as no acute disease.



ASSESSMENT:  This is a 58 year  female.



1. Chest pain.

2. Left knee pain.

3. Left ankle pain.

4. Hypertension.

5. Depression.

6. Degenerative joint disease.

7. Fibromyalgia.



TREATMENT:

1. Chest pain.  A Cardiology consultation has been obtained with Dr. Fili De La Fuente.  Serial troponin levels will be performed.  We will

follow recommendations of Cardiology.

2. Left knee pain.  An x-ray of the left knee is pending.  Given history

of surgery on the left knee, an orthopedic consultation has been obtained

with Dr. Jimmie Fletcher.

3. Left ankle pain.  An x-ray of the left ankle is pending.

4. Hypertension.  Continue valsartan as above.

5. Depression.  A psychiatric consultation has been with Dr. Colon.

Continue Cymbalta as above.

6. Degenerative joint disease of bilateral knees.

7. Fibromyalgia.









  ______________________________________________

  Ludwin Fulton M.D.





DR:  SHELIA

D:  10/15/2019 17:36

T:  10/16/2019 01:16

JOB#:  [id number]

CC:

## 2019-10-16 NOTE — NUR
NURSE NOTES:

Rn left message for Dr. Reddy regarding hgb 8.9, also notified Dr. Fulton (left message)

-------------------------------------------------------------------------------

Addendum: 10/16/19 at 1410 by LIZETH TAVERA RN

-------------------------------------------------------------------------------

NURSE NOTES:

Notified Dr. De La Fuente hbg 8.9, asked about continuing with plan for Lexiscan today.

## 2019-10-16 NOTE — NUR
NURSE NOTES:

RECEIVED REPORT FROM THALIA STANLEY FROM TELEMETRY. BELONGINGS REVIEWED AT BEDSIDE. PT DID NOT 
WANT TO REVIEW CASH OR CREDIT CARDS NOTED IN BELONGINGS SHEET BUT STATED SHE HAS ALL HER 
CASH AND CARDS. PT ABLE TO AMBULATE USING ROLLING WALKER WITH SUPERVISION TO BATHROOM. IN NO 
APPARENT DISTRESS. WILL CONTINUE TO MONITOR.

## 2019-10-16 NOTE — NUR
NURSE NOTES:

Pt is in bed, awake and verbal. Breathing on room air. No acute distress noted. IV site on 
left arm is patent.  Bed locked low in position,side rails up and call light within reach. 
Pt will be monitored.

## 2019-10-16 NOTE — PULMONOLOGY PROGRESS NOTE
Assessment/Plan


Problems:  


(1) Acute coronary syndrome


(2) Intractable neuropathic pain of left knee


(3) HTN (hypertension)


(4) Bipolar 1 disorder, depressed


(5) Obesity (BMI 35.0-39.9 without comorbidity)


(6) Osteoarthritis of left knee


Assessment/Plan


stress study in progress


symptomatic treatment


pain management


monitor BP


pt/ot evaluation


dc planning to rehab.





Subjective


ROS Limited/Unobtainable:  No


Constitutional:  Reports: no symptoms


HEENT:  Repors: no symptoms


Respiratory:  Reports: no symptoms


Allergies:  


Coded Allergies:  


     LAMOTRIGINE (Verified  Allergy, Mild, Hives, 10/5/18)


     PENICILLINS (Verified  Allergy, Mild, Hives, 10/5/18)


     ERYTHROMYCIN BASE (Unverified  Allergy, Unknown, Rash, 10/5/18)


     PREGABALIN (Verified  Allergy, Unknown, 10/5/18)


 SWELLING


     SERTRALINE (Verified  Allergy, Unknown, 10/5/18)


     TRAMADOL HCL (Verified  Allergy, Unknown, 2/25/15)


 VOMIT





Objective





Last 24 Hour Vital Signs








  Date Time  Temp Pulse Resp B/P (MAP) Pulse Ox O2 Delivery O2 Flow Rate FiO2


 


10/16/19 09:43 98.2       


 


10/16/19 08:47  62      


 


10/16/19 08:03      Room Air  


 


10/16/19 08:00 98.3 66 18 109/68 (82) 98   


 


10/16/19 04:00  57      


 


10/16/19 04:00 98.2 64 20 109/64 (79) 98   


 


10/16/19 00:00 98.0 66 18 132/77 (95) 98   


 


10/15/19 23:24  68      


 


10/15/19 21:00      Room Air  


 


10/15/19 20:00 98.5 73 20 114/60 (78) 97   


 


10/15/19 20:00  87      


 


10/15/19 16:00  70      


 


10/15/19 16:00 98.8 70 18 107/42 (63) 97   


 


10/15/19 12:00  63      


 


10/15/19 12:00 98.3 60 20 101/50 (67) 95   

















Intake and Output  


 


 10/15/19 10/16/19





 18:59 06:59


 


Intake Total 280 ml 


 


Balance 280 ml 


 


  


 


Intake Oral 280 ml 


 


# Voids  2








General Appearance:  WD/WN


HEENT:  normocephalic, atraumatic


Respiratory/Chest:  chest wall non-tender, lungs clear


Breasts:  no masses


Cardiovascular:  normal peripheral pulses, normal rate


Abdomen:  normal bowel sounds, soft, non tender


Extremities:  no cyanosis


Skin:  no rash


Neurologic/Psychiatric:  CNs II-XII grossly normal


Lymphatic:  no neck adenopathy


Laboratory Tests


10/16/19 06:22: 


White Blood Count 6.0, Red Blood Count 3.14L, Hemoglobin 8.9L, Hematocrit 28.1L

, Mean Corpuscular Volume 89, Mean Corpuscular Hemoglobin 28.3, Mean 

Corpuscular Hemoglobin Concent 31.7L, Red Cell Distribution Width 15.0H, 

Platelet Count 293, Mean Platelet Volume 5.3L, Neutrophils (%) (Auto) 55.5, 

Lymphocytes (%) (Auto) 31.3, Monocytes (%) (Auto) 8.9, Eosinophils (%) (Auto) 

3.6H, Basophils (%) (Auto) 0.7, Sodium Level 139, Potassium Level 4.4, Chloride 

Level 107, Carbon Dioxide Level 26, Anion Gap 7, Blood Urea Nitrogen 16, 

Creatinine 1.1, Estimat Glomerular Filtration Rate > 60, Glucose Level 94, 

Calcium Level 8.1L, Troponin I 0.000





Current Medications








 Medications


  (Trade)  Dose


 Ordered  Sig/Tapan


 Route


 PRN Reason  Start Time


 Stop Time Status Last Admin


Dose Admin


 


 Acetaminophen


  (Tylenol)  650 mg  Q4H  PRN


 ORAL


 FEVER  10/14/19 15:45


 11/13/19 15:44   


 


 


 Acetaminophen/


 Hydrocodone Bitart


  (Norco 5/325)  1 tab  Q6H  PRN


 ORAL


 pain 4-10  10/14/19 17:00


 10/21/19 16:59  10/15/19 06:29


 


 


 Albuterol/


 Ipratropium


  (Albuterol/


 Ipratropium)  3 ml  Q4H  PRN


 HHN


 Shortness of Breath  10/14/19 15:45


 10/19/19 15:44   


 


 


 Aspirin


  (ASA)  162 mg  DAILY


 ORAL


   10/15/19 09:00


 11/14/19 08:59  10/16/19 09:12


 


 


 Betamethasone/


 Clotrimazole


  (Lotrisone)  1 applic  TWICE A  DAY


 TOPIC


   10/15/19 18:30


 11/14/19 18:29  10/16/19 09:18


 


 


 Bupropion HCl


  (Wellbutrin XL)  150 mg  DAILY


 ORAL


   10/16/19 09:00


 11/15/19 08:59  10/16/19 09:12


 


 


 Diltiazem HCl


  (Cardizem)  10 mg  Q1H  PRN


 IV


 heart rate more than 120,   10/14/19 15:45


 11/13/19 15:44   


 


 


 Diphenhydramine


 HCl


  (Benadryl)  25 mg  Q6H  PRN


 ORAL


 Itching  10/14/19 17:00


 11/13/19 16:59   


 


 


 Enalaprilat


  (Vasotec)  2.5 mg  Q6H  PRN


 IV


 sbp more than 160  10/14/19 15:45


 11/13/19 15:44   


 


 


 Heparin Sodium


  (Porcine)


  (Heparin 5000


 units/ml)  5,000 units  EVERY 12  HOURS


 SUBQ


   10/14/19 21:00


 11/13/19 20:59  10/16/19 09:15


 


 


 Morphine Sulfate


  (Morphine


 Sulfate)  2 mg  Q4H  PRN


 IVP


 Severe Pain (Pain Scale 7-10)  10/15/19 18:00


 10/22/19 17:59  10/16/19 09:13


 


 


 Nitroglycerin


  (Ntg)  0.4 mg  Q5M  PRN


 SL


 Prn Chest Pain  10/14/19 15:45


 11/13/19 15:44   


 


 


 Ondansetron HCl


  (Zofran)  4 mg  Q6H  PRN


 IVP


 Nausea & Vomiting  10/14/19 15:45


 11/13/19 15:44  10/16/19 06:53


 


 


 Polyethylene


 Glycol


  (Miralax)  17 gm  DAILYPRN  PRN


 ORAL


 Constipation  10/14/19 15:45


 11/13/19 15:44   


 


 


 Quetiapine


 Fumarate


  (SEROquel)  150 mg  BEDTIME


 ORAL


   10/15/19 21:00


 11/14/19 20:59  10/15/19 20:23


 


 


 Regadenoson


  (Lexiscan)  0.4 mg  ONCE  PRN


 IV


 CARDIOLOGY  10/15/19 13:30


 10/16/19 23:59   


 


 


 Temazepam


  (Restoril)  15 mg  HSPRN  PRN


 ORAL


 Insomnia  10/14/19 15:45


 10/21/19 15:44  10/15/19 00:34


 


 


 Trazodone HCl


  (Desyrel)  300 mg  BEDTIME


 ORAL


   10/14/19 21:00


 11/13/19 20:59  10/15/19 20:24


 

















Nilda Arellano MD Oct 16, 2019 11:44

## 2019-10-16 NOTE — INTERNAL MED PROGRESS NOTE
Subjective


Date of Service:  Oct 16, 2019


Physician Name


Ludwin Fulton


Attending Physician


Medhat Vogt MD





Current Medications








 Medications


  (Trade)  Dose


 Ordered  Sig/Tapan


 Route


 PRN Reason  Start Time


 Stop Time Status Last Admin


Dose Admin


 


 Acetaminophen


  (Tylenol)  650 mg  Q4H  PRN


 ORAL


 FEVER  10/14/19 15:45


 11/13/19 15:44   


 


 


 Acetaminophen/


 Hydrocodone Bitart


  (Norco 5/325)  1 tab  Q6H  PRN


 ORAL


 pain 4-10  10/14/19 17:00


 10/21/19 16:59  10/15/19 06:29


 


 


 Albuterol/


 Ipratropium


  (Albuterol/


 Ipratropium)  3 ml  Q4H  PRN


 HHN


 Shortness of Breath  10/14/19 15:45


 10/19/19 15:44   


 


 


 Aspirin


  (ASA)  162 mg  DAILY


 ORAL


   10/15/19 09:00


 11/14/19 08:59  10/16/19 09:12


 


 


 Betamethasone/


 Clotrimazole


  (Lotrisone)  1 applic  TWICE A  DAY


 TOPIC


   10/15/19 18:30


 11/14/19 18:29  10/16/19 09:18


 


 


 Bupropion HCl


  (Wellbutrin XL)  150 mg  DAILY


 ORAL


   10/16/19 09:00


 11/15/19 08:59  10/16/19 09:12


 


 


 Diltiazem HCl


  (Cardizem)  10 mg  Q1H  PRN


 IV


 heart rate more than 120,   10/14/19 15:45


 11/13/19 15:44   


 


 


 Diphenhydramine


 HCl


  (Benadryl)  25 mg  Q6H  PRN


 ORAL


 Itching  10/14/19 17:00


 11/13/19 16:59   


 


 


 Enalaprilat


  (Vasotec)  2.5 mg  Q6H  PRN


 IV


 sbp more than 160  10/14/19 15:45


 11/13/19 15:44   


 


 


 Heparin Sodium


  (Porcine)


  (Heparin 5000


 units/ml)  5,000 units  EVERY 12  HOURS


 SUBQ


   10/14/19 21:00


 11/13/19 20:59  10/16/19 09:15


 


 


 Morphine Sulfate


  (Morphine


 Sulfate)  2 mg  Q4H  PRN


 IVP


 Severe Pain (Pain Scale 7-10)  10/15/19 18:00


 10/22/19 17:59  10/16/19 09:13


 


 


 Nitroglycerin


  (Ntg)  0.4 mg  Q5M  PRN


 SL


 Prn Chest Pain  10/14/19 15:45


 11/13/19 15:44   


 


 


 Ondansetron HCl


  (Zofran)  4 mg  Q6H  PRN


 IVP


 Nausea & Vomiting  10/14/19 15:45


 11/13/19 15:44  10/16/19 06:53


 


 


 Polyethylene


 Glycol


  (Miralax)  17 gm  DAILYPRN  PRN


 ORAL


 Constipation  10/14/19 15:45


 11/13/19 15:44   


 


 


 Quetiapine


 Fumarate


  (SEROquel)  150 mg  BEDTIME


 ORAL


   10/15/19 21:00


 11/14/19 20:59  10/15/19 20:23


 


 


 Regadenoson


  (Lexiscan)  0.4 mg  ONCE  PRN


 IV


 CARDIOLOGY  10/15/19 13:30


 10/16/19 23:59   


 


 


 Temazepam


  (Restoril)  15 mg  HSPRN  PRN


 ORAL


 Insomnia  10/14/19 15:45


 10/21/19 15:44  10/15/19 00:34


 


 


 Trazodone HCl


  (Desyrel)  300 mg  BEDTIME


 ORAL


   10/14/19 21:00


 11/13/19 20:59  10/15/19 20:24


 








Allergies:  


Coded Allergies:  


     LAMOTRIGINE (Verified  Allergy, Mild, Hives, 10/5/18)


     PENICILLINS (Verified  Allergy, Mild, Hives, 10/5/18)


     ERYTHROMYCIN BASE (Unverified  Allergy, Unknown, Rash, 10/5/18)


     PREGABALIN (Verified  Allergy, Unknown, 10/5/18)


 SWELLING


     SERTRALINE (Verified  Allergy, Unknown, 10/5/18)


     TRAMADOL HCL (Verified  Allergy, Unknown, 2/25/15)


 VOMIT


ROS Limited/Unobtainable:  No


Constitutional:  Reports: no symptoms


HEENT:  Reports: no symptoms


Cardiovascular:  Reports: chest pain


Respiratory:  Reports: no symptoms


Gastrointestinal/Abdominal:  Reports: no symptoms


Genitourinary:  Reports: no symptoms


Neurologic/Psychiatric:  Reports: no symptoms


Subjective


57 YO F admitted with chest pain, left knee pain and left ankle pain.  Cover 

for Int Josh Vogt





Objective





Last Vital Signs








  Date Time  Temp Pulse Resp B/P (MAP) Pulse Ox O2 Delivery O2 Flow Rate FiO2


 


10/16/19 09:43 98.2       


 


10/16/19 08:47  62      


 


10/16/19 08:03      Room Air  


 


10/16/19 08:00   18 109/68 (82) 98   











Laboratory Tests








Test


  10/16/19


06:22


 


White Blood Count


  6.0 K/UL


(4.8-10.8)


 


Red Blood Count


  3.14 M/UL


(4.20-5.40)  L


 


Hemoglobin


  8.9 G/DL


(12.0-16.0)  L


 


Hematocrit


  28.1 %


(37.0-47.0)  L


 


Mean Corpuscular Volume 89 FL (80-99)  


 


Mean Corpuscular Hemoglobin


  28.3 PG


(27.0-31.0)


 


Mean Corpuscular Hemoglobin


Concent 31.7 G/DL


(32.0-36.0)  L


 


Red Cell Distribution Width


  15.0 %


(11.6-14.8)  H


 


Platelet Count


  293 K/UL


(150-450)


 


Mean Platelet Volume


  5.3 FL


(6.5-10.1)  L


 


Neutrophils (%) (Auto)


  55.5 %


(45.0-75.0)


 


Lymphocytes (%) (Auto)


  31.3 %


(20.0-45.0)


 


Monocytes (%) (Auto)


  8.9 %


(1.0-10.0)


 


Eosinophils (%) (Auto)


  3.6 %


(0.0-3.0)  H


 


Basophils (%) (Auto)


  0.7 %


(0.0-2.0)


 


Sodium Level


  139 MMOL/L


(136-145)


 


Potassium Level


  4.4 MMOL/L


(3.5-5.1)


 


Chloride Level


  107 MMOL/L


()


 


Carbon Dioxide Level


  26 MMOL/L


(21-32)


 


Anion Gap


  7 mmol/L


(5-15)


 


Blood Urea Nitrogen


  16 mg/dL


(7-18)


 


Creatinine


  1.1 MG/DL


(0.55-1.30)


 


Estimat Glomerular Filtration


Rate > 60 mL/min


(>60)


 


Glucose Level


  94 MG/DL


()


 


Calcium Level


  8.1 MG/DL


(8.5-10.1)  L


 


Troponin I


  0.000 ng/mL


(0.000-0.056)

















Intake and Output  


 


 10/15/19 10/16/19





 18:59 06:59


 


Intake Total 280 ml 


 


Balance 280 ml 


 


  


 


Intake Oral 280 ml 


 


# Voids  2








Objective


General Appearance:  WD/WN, no apparent distress, alert


EENT:  PERRL/EOMI, normal ENT inspection


Neck:  non-tender, normal alignment, supple, normal inspection


Cardiovascular:  normal peripheral pulses, normal rate, regular rhythm, no 

gallop/murmur, no JVD


Respiratory/Chest:  chest wall non-tender, lungs clear, normal breath sounds, 

no respiratory distress, no accessory muscle use


Abdomen:  normal bowel sounds, non tender, soft, no organomegaly, no mass


Extremities:  normal range of motion, non-tender


Neurologic:  CNs II-XII grossly normal, no motor/sensory deficits


Skin:  normal pigmentation, warm/dry





Assessment/Plan


Problem List:  


(1) ACS (acute coronary syndrome)


Assessment & Plan:  See cardiology note=Dr De La Fuente.   Cardiac stress test 10/

16/19-await results.





(2) Left knee pain


Assessment & Plan:  Await xray





(3) Left ankle pain


Assessment & Plan:  await xray





(4) Hypertension


Assessment & Plan:  Continue valsartan





(5) Bipolar depression


Assessment & Plan:  See psych note.  Continue cymbalta





(6) DJD (degenerative joint disease) of knee


(7) Fibromyalgia











Ludwin Fulton MD Oct 16, 2019 11:40

## 2019-10-17 VITALS — DIASTOLIC BLOOD PRESSURE: 55 MMHG | SYSTOLIC BLOOD PRESSURE: 102 MMHG

## 2019-10-17 VITALS — DIASTOLIC BLOOD PRESSURE: 54 MMHG | SYSTOLIC BLOOD PRESSURE: 102 MMHG

## 2019-10-17 VITALS — DIASTOLIC BLOOD PRESSURE: 51 MMHG | SYSTOLIC BLOOD PRESSURE: 111 MMHG

## 2019-10-17 VITALS — DIASTOLIC BLOOD PRESSURE: 67 MMHG | SYSTOLIC BLOOD PRESSURE: 128 MMHG

## 2019-10-17 VITALS — DIASTOLIC BLOOD PRESSURE: 71 MMHG | SYSTOLIC BLOOD PRESSURE: 108 MMHG

## 2019-10-17 LAB
ADD MANUAL DIFF: NO
ANION GAP SERPL CALC-SCNC: 9 MMOL/L (ref 5–15)
BASOPHILS NFR BLD AUTO: 0.8 % (ref 0–2)
BUN SERPL-MCNC: 20 MG/DL (ref 7–18)
CALCIUM SERPL-MCNC: 8.4 MG/DL (ref 8.5–10.1)
CHLORIDE SERPL-SCNC: 105 MMOL/L (ref 98–107)
CO2 SERPL-SCNC: 26 MMOL/L (ref 21–32)
CREAT SERPL-MCNC: 0.9 MG/DL (ref 0.55–1.3)
EOSINOPHIL NFR BLD AUTO: 2.9 % (ref 0–3)
ERYTHROCYTE [DISTWIDTH] IN BLOOD BY AUTOMATED COUNT: 15.2 % (ref 11.6–14.8)
HCT VFR BLD CALC: 29.3 % (ref 37–47)
HGB BLD-MCNC: 9.1 G/DL (ref 12–16)
LYMPHOCYTES NFR BLD AUTO: 29.6 % (ref 20–45)
MCV RBC AUTO: 89 FL (ref 80–99)
MONOCYTES NFR BLD AUTO: 7.2 % (ref 1–10)
NEUTROPHILS NFR BLD AUTO: 59.4 % (ref 45–75)
PLATELET # BLD: 295 K/UL (ref 150–450)
POTASSIUM SERPL-SCNC: 4.7 MMOL/L (ref 3.5–5.1)
RBC # BLD AUTO: 3.29 M/UL (ref 4.2–5.4)
SODIUM SERPL-SCNC: 140 MMOL/L (ref 136–145)
WBC # BLD AUTO: 5.9 K/UL (ref 4.8–10.8)

## 2019-10-17 RX ADMIN — MORPHINE SULFATE PRN MG: 2 INJECTION, SOLUTION INTRAMUSCULAR; INTRAVENOUS at 13:10

## 2019-10-17 RX ADMIN — LORAZEPAM PRN MG: 2 INJECTION, SOLUTION INTRAMUSCULAR; INTRAVENOUS at 06:49

## 2019-10-17 RX ADMIN — HEPARIN SODIUM SCH UNITS: 5000 INJECTION INTRAVENOUS; SUBCUTANEOUS at 09:10

## 2019-10-17 RX ADMIN — MORPHINE SULFATE PRN MG: 2 INJECTION, SOLUTION INTRAMUSCULAR; INTRAVENOUS at 09:06

## 2019-10-17 RX ADMIN — LORAZEPAM PRN MG: 2 INJECTION, SOLUTION INTRAMUSCULAR; INTRAVENOUS at 14:15

## 2019-10-17 RX ADMIN — MORPHINE SULFATE PRN MG: 2 INJECTION, SOLUTION INTRAMUSCULAR; INTRAVENOUS at 05:03

## 2019-10-17 RX ADMIN — MORPHINE SULFATE PRN MG: 2 INJECTION, SOLUTION INTRAMUSCULAR; INTRAVENOUS at 01:07

## 2019-10-17 NOTE — NUR
Social Service Note



SW met with patient to address concerns verbalized by patient to staff.  At this time 
patient declined contacting [employer] to file a report regarding concerns.  Patient indicated she 
would follow up on action independently if required.  SW provided emotional support.  SW 
will continue to be available as needed.

## 2019-10-17 NOTE — PULMONOLOGY PROGRESS NOTE
Assessment/Plan


Problems:  


(1) Acute coronary syndrome


(2) Intractable neuropathic pain of left knee


(3) HTN (hypertension)


(4) Bipolar 1 disorder, depressed


(5) Obesity (BMI 35.0-39.9 without comorbidity)


(6) Osteoarthritis of left knee


Assessment/Plan


no new complains


wants her Neurontin


symptomatic treatment


pain management


monitor BP


pt/ot evaluation


dc planning to rehab.





Subjective


ROS Limited/Unobtainable:  No


Constitutional:  Reports: no symptoms


HEENT:  Repors: no symptoms


Respiratory:  Reports: no symptoms


Allergies:  


Coded Allergies:  


     LAMOTRIGINE (Verified  Allergy, Mild, Hives, 10/5/18)


     PENICILLINS (Verified  Allergy, Mild, Hives, 10/5/18)


     ERYTHROMYCIN BASE (Unverified  Allergy, Unknown, Rash, 10/5/18)


     PREGABALIN (Verified  Allergy, Unknown, 10/5/18)


 SWELLING


     SERTRALINE (Verified  Allergy, Unknown, 10/5/18)


     TRAMADOL HCL (Verified  Allergy, Unknown, 2/25/15)


 VOMIT





Objective





Last 24 Hour Vital Signs








  Date Time  Temp Pulse Resp B/P (MAP) Pulse Ox O2 Delivery O2 Flow Rate FiO2


 


10/17/19 09:00      Room Air  


 


10/17/19 08:00 97.9 75 18 108/71 (83) 97   


 


10/17/19 04:00 97.5 69 17 102/54 (70) 98   


 


10/17/19 00:00 97.8 79 18 102/55 (71) 98   


 


10/16/19 21:00      Room Air  


 


10/16/19 20:00 98.1 75 16 99/48 (65) 99   


 


10/16/19 16:00 97.5 77 18 108/52 (70) 99   


 


10/16/19 15:09 98.2       


 


10/16/19 12:26  58      

















Intake and Output  


 


 10/16/19 10/17/19





 18:59 06:59


 


Intake Total 630 ml 360 ml


 


Balance 630 ml 360 ml


 


  


 


Intake Oral 480 ml 360 ml


 


IV Total 150 ml 


 


# Voids 1 2








General Appearance:  no acute distress


HEENT:  normocephalic


Respiratory/Chest:  chest wall non-tender, lungs clear


Breasts:  no masses


Cardiovascular:  normal peripheral pulses


Abdomen:  normal bowel sounds, soft, non tender


Genitourinary:  normal external genitalia


Extremities:  no clubbing


Skin:  no rash


Neurologic/Psychiatric:  CNs II-XII grossly normal


Laboratory Tests


10/17/19 05:26: 


White Blood Count 5.9, Red Blood Count 3.29L, Hemoglobin 9.1L, Hematocrit 29.3L

, Mean Corpuscular Volume 89, Mean Corpuscular Hemoglobin 27.7, Mean 

Corpuscular Hemoglobin Concent 31.1L, Red Cell Distribution Width 15.2H, 

Platelet Count 295, Mean Platelet Volume 5.5L, Neutrophils (%) (Auto) 59.4, 

Lymphocytes (%) (Auto) 29.6, Monocytes (%) (Auto) 7.2, Eosinophils (%) (Auto) 

2.9, Basophils (%) (Auto) 0.8, Sodium Level 140, Potassium Level 4.7, Chloride 

Level 105, Carbon Dioxide Level 26, Anion Gap 9, Blood Urea Nitrogen 20H, 

Creatinine 0.9, Estimat Glomerular Filtration Rate > 60, Glucose Level 81, 

Calcium Level 8.4L





Current Medications








 Medications


  (Trade)  Dose


 Ordered  Sig/Tapan


 Route


 PRN Reason  Start Time


 Stop Time Status Last Admin


Dose Admin


 


 Acetaminophen


  (Tylenol)  650 mg  Q4H  PRN


 ORAL


 FEVER  10/16/19 21:00


 11/13/19 20:59   


 


 


 Acetaminophen/


 Hydrocodone Bitart


  (Norco 5/325)  1 tab  Q6H  PRN


 ORAL


 pain 4-10  10/16/19 21:00


 10/21/19 20:59   


 


 


 Albuterol/


 Ipratropium


  (Albuterol/


 Ipratropium)  3 ml  Q4H  PRN


 HHN


 Shortness of Breath  10/16/19 21:00


 10/19/19 20:59   


 


 


 Aspirin


  (ASA)  162 mg  DAILY


 ORAL


   10/17/19 09:00


 11/14/19 08:59  10/17/19 09:05


 


 


 Betamethasone/


 Clotrimazole


  (Lotrisone)  1 applic  TWICE A  DAY


 TOPIC


   10/17/19 09:00


 11/14/19 18:29  10/17/19 09:05


 


 


 Bupropion HCl


  (Wellbutrin XL)  150 mg  DAILY


 ORAL


   10/17/19 09:00


 11/15/19 08:59  10/17/19 09:04


 


 


 Diphenhydramine


 HCl


  (Benadryl)  25 mg  Q6H  PRN


 ORAL


 Itching  10/16/19 21:00


 11/13/19 20:59   


 


 


 Enalaprilat


  (Vasotec)  2.5 mg  Q6H  PRN


 IV


 sbp more than 160  10/16/19 21:00


 11/13/19 20:59   


 


 


 Heparin Sodium


  (Porcine)


  (Heparin 5000


 units/ml)  5,000 units  EVERY 12  HOURS


 SUBQ


   10/16/19 21:00


 11/13/19 20:59  10/17/19 09:10


 


 


 Lorazepam


  (Ativan 2mg/ml


 1ml)  2 mg  Q6H  PRN


 IV


 For Anxiety  10/16/19 21:00


 10/23/19 20:59  10/17/19 06:49


 


 


 Morphine Sulfate


  (Morphine


 Sulfate)  2 mg  Q4H  PRN


 IVP


 Severe Pain (Pain Scale 7-10)  10/16/19 22:00


 10/22/19 17:59  10/17/19 09:06


 


 


 Nitroglycerin


  (Ntg)  0.4 mg  Q5M  PRN


 SL


 Prn Chest Pain  10/16/19 20:45


 11/13/19 15:44   


 


 


 Ondansetron HCl


  (Zofran)  4 mg  Q4H  PRN


 IVP


 Nausea & Vomiting  10/16/19 22:00


 11/15/19 21:59  10/17/19 09:05


 


 


 Polyethylene


 Glycol


  (Miralax)  17 gm  DAILYPRN  PRN


 ORAL


 Constipation  10/16/19 21:00


 11/15/19 20:59   


 


 


 Quetiapine


 Fumarate


  (SEROquel)  150 mg  BEDTIME


 ORAL


   10/16/19 21:00


 11/14/19 20:59  10/16/19 20:51


 


 


 Temazepam


  (Restoril)  15 mg  HSPRN  PRN


 ORAL


 Insomnia  10/16/19 21:00


 10/23/19 20:59  10/16/19 20:53


 


 


 Trazodone HCl


  (Desyrel)  300 mg  BEDTIME


 ORAL


   10/16/19 21:00


 11/13/19 20:59  10/16/19 20:52


 

















Nilda Arellano MD Oct 17, 2019 12:01

## 2019-10-17 NOTE — NUR
NURSE NOTES:

Received patient on bed, asleep. IV intact and patent. Bed in low and locked position, call 
light in reach. Room board updated, will continue to monitor.

## 2019-10-17 NOTE — NUR
NOTES







PT ACCEPTED TO [facility] ROOM 23 BED B. [facility] TO TRANSPORT PT WITH AN 
ETA OF 1600. NURSE MADE AWARE.

## 2019-10-17 NOTE — NUR
DISCHARGE PLANNING



DISCHARGE ORDER NOTED



Patient has been accepted to;



[facility]

[address] [place] [address]



Bed:23-B

Skilled 

[phone] for Nurse to Nurse report



[facility] ETA for transportation: 17:30

## 2019-10-17 NOTE — INTERNAL MED PROGRESS NOTE
Subjective


Date of Service:  Oct 17, 2019


Physician Name


Ludwin Fulton


Attending Physician


Medhat Vogt MD





Current Medications








 Medications


  (Trade)  Dose


 Ordered  Sig/Tapan


 Route


 PRN Reason  Start Time


 Stop Time Status Last Admin


Dose Admin


 


 Acetaminophen


  (Tylenol)  650 mg  Q4H  PRN


 ORAL


 FEVER  10/16/19 21:00


 11/13/19 20:59   


 


 


 Acetaminophen/


 Hydrocodone Bitart


  (Norco 5/325)  1 tab  Q6H  PRN


 ORAL


 pain 4-10  10/16/19 21:00


 10/21/19 20:59   


 


 


 Albuterol/


 Ipratropium


  (Albuterol/


 Ipratropium)  3 ml  Q4H  PRN


 HHN


 Shortness of Breath  10/16/19 21:00


 10/19/19 20:59   


 


 


 Aspirin


  (ASA)  162 mg  DAILY


 ORAL


   10/17/19 09:00


 11/14/19 08:59  10/17/19 09:05


 


 


 Betamethasone/


 Clotrimazole


  (Lotrisone)  1 applic  TWICE A  DAY


 TOPIC


   10/17/19 09:00


 11/14/19 18:29  10/17/19 09:05


 


 


 Bupropion HCl


  (Wellbutrin XL)  150 mg  DAILY


 ORAL


   10/17/19 09:00


 11/15/19 08:59  10/17/19 09:04


 


 


 Diphenhydramine


 HCl


  (Benadryl)  25 mg  Q6H  PRN


 ORAL


 Itching  10/16/19 21:00


 11/13/19 20:59   


 


 


 Enalaprilat


  (Vasotec)  2.5 mg  Q6H  PRN


 IV


 sbp more than 160  10/16/19 21:00


 11/13/19 20:59   


 


 


 Gabapentin


  (Neurontin)  600 mg  Q12HR


 GT


   10/17/19 14:00


 11/16/19 13:59  10/17/19 14:15


 


 


 Heparin Sodium


  (Porcine)


  (Heparin 5000


 units/ml)  5,000 units  EVERY 12  HOURS


 SUBQ


   10/16/19 21:00


 11/13/19 20:59  10/17/19 09:10


 


 


 Lorazepam


  (Ativan 2mg/ml


 1ml)  2 mg  Q6H  PRN


 IV


 For Anxiety  10/16/19 21:00


 10/23/19 20:59  10/17/19 14:15


 


 


 Morphine Sulfate


  (Morphine


 Sulfate)  2 mg  Q4H  PRN


 IVP


 Severe Pain (Pain Scale 7-10)  10/16/19 22:00


 10/22/19 17:59  10/17/19 13:10


 


 


 Nitroglycerin


  (Ntg)  0.4 mg  Q5M  PRN


 SL


 Prn Chest Pain  10/16/19 20:45


 11/13/19 15:44   


 


 


 Ondansetron HCl


  (Zofran)  4 mg  Q4H  PRN


 IVP


 Nausea & Vomiting  10/16/19 22:00


 11/15/19 21:59  10/17/19 13:10


 


 


 Polyethylene


 Glycol


  (Miralax)  17 gm  DAILYPRN  PRN


 ORAL


 Constipation  10/16/19 21:00


 11/15/19 20:59   


 


 


 Quetiapine


 Fumarate


  (SEROquel)  150 mg  BEDTIME


 ORAL


   10/16/19 21:00


 11/14/19 20:59  10/16/19 20:51


 


 


 Temazepam


  (Restoril)  15 mg  HSPRN  PRN


 ORAL


 Insomnia  10/16/19 21:00


 10/23/19 20:59  10/16/19 20:53


 


 


 Trazodone HCl


  (Desyrel)  300 mg  BEDTIME


 ORAL


   10/16/19 21:00


 11/13/19 20:59  10/16/19 20:52


 








Allergies:  


Coded Allergies:  


     LAMOTRIGINE (Verified  Allergy, Mild, Hives, 10/5/18)


     PENICILLINS (Verified  Allergy, Mild, Hives, 10/5/18)


     ERYTHROMYCIN BASE (Unverified  Allergy, Unknown, Rash, 10/5/18)


     PREGABALIN (Verified  Allergy, Unknown, 10/5/18)


 SWELLING


     SERTRALINE (Verified  Allergy, Unknown, 10/5/18)


     TRAMADOL HCL (Verified  Allergy, Unknown, 2/25/15)


 VOMIT


ROS Limited/Unobtainable:  No


Constitutional:  Reports: no symptoms


HEENT:  Reports: no symptoms


Cardiovascular:  Reports: no symptoms


Respiratory:  Reports: no symptoms


Gastrointestinal/Abdominal:  Reports: no symptoms


Genitourinary:  Reports: no symptoms


Neurologic/Psychiatric:  Reports: no symptoms


Subjective


57 YO F admitted with chest pain, left knee pain and left ankle pain.  Cover 

for Int Josh Vogt





Objective





Last Vital Signs








  Date Time  Temp Pulse Resp B/P (MAP) Pulse Ox O2 Delivery O2 Flow Rate FiO2


 


10/17/19 12:00 98.1 78 18 128/67 (87) 96   


 


10/17/19 09:00      Room Air  


 


10/17/19 08:20        21











Laboratory Tests








Test


  10/17/19


05:26


 


White Blood Count


  5.9 K/UL


(4.8-10.8)


 


Red Blood Count


  3.29 M/UL


(4.20-5.40)  L


 


Hemoglobin


  9.1 G/DL


(12.0-16.0)  L


 


Hematocrit


  29.3 %


(37.0-47.0)  L


 


Mean Corpuscular Volume 89 FL (80-99)  


 


Mean Corpuscular Hemoglobin


  27.7 PG


(27.0-31.0)


 


Mean Corpuscular Hemoglobin


Concent 31.1 G/DL


(32.0-36.0)  L


 


Red Cell Distribution Width


  15.2 %


(11.6-14.8)  H


 


Platelet Count


  295 K/UL


(150-450)


 


Mean Platelet Volume


  5.5 FL


(6.5-10.1)  L


 


Neutrophils (%) (Auto)


  59.4 %


(45.0-75.0)


 


Lymphocytes (%) (Auto)


  29.6 %


(20.0-45.0)


 


Monocytes (%) (Auto)


  7.2 %


(1.0-10.0)


 


Eosinophils (%) (Auto)


  2.9 %


(0.0-3.0)


 


Basophils (%) (Auto)


  0.8 %


(0.0-2.0)


 


Sodium Level


  140 MMOL/L


(136-145)


 


Potassium Level


  4.7 MMOL/L


(3.5-5.1)


 


Chloride Level


  105 MMOL/L


()


 


Carbon Dioxide Level


  26 MMOL/L


(21-32)


 


Anion Gap


  9 mmol/L


(5-15)


 


Blood Urea Nitrogen


  20 mg/dL


(7-18)  H


 


Creatinine


  0.9 MG/DL


(0.55-1.30)


 


Estimat Glomerular Filtration


Rate > 60 mL/min


(>60)


 


Glucose Level


  81 MG/DL


()


 


Calcium Level


  8.4 MG/DL


(8.5-10.1)  L

















Intake and Output  


 


 10/16/19 10/17/19





 19:00 07:00


 


Intake Total 630 ml 360 ml


 


Balance 630 ml 360 ml


 


  


 


Intake Oral 480 ml 360 ml


 


IV Total 150 ml 


 


# Voids 1 2








Objective


General Appearance:  WD/WN, no apparent distress, alert


EENT:  PERRL/EOMI, normal ENT inspection


Neck:  non-tender, normal alignment, supple, normal inspection


Cardiovascular:  normal peripheral pulses, normal rate, regular rhythm, no 

gallop/murmur, no JVD


Respiratory/Chest:  chest wall non-tender, lungs clear, normal breath sounds, 

no respiratory distress, no accessory muscle use


Abdomen:  normal bowel sounds, non tender, soft, no organomegaly, no mass


Extremities:  normal range of motion, non-tender


Neurologic:  CNs II-XII grossly normal, no motor/sensory deficits


Skin:  normal pigmentation, warm/dry





Assessment/Plan


Problem List:  


(1) ACS (acute coronary syndrome)


Assessment & Plan:  See cardiology note=Dr De La Fuente.   Cardiac stress test 10/

16/19-await results.





(2) Left knee pain


Assessment & Plan:  Await xray





(3) Left ankle pain


Assessment & Plan:  await xray





(4) Hypertension


Assessment & Plan:  Continue valsartan





(5) Bipolar depression


Assessment & Plan:  See psych note.  Continue cymbalta





(6) DJD (degenerative joint disease) of knee


(7) Fibromyalgia


Assessment/Plan


Discharge to [facility] today











Ludwin Fulton MD Oct 17, 2019 17:45

## 2019-10-17 NOTE — NUR
NURSE NOTES:

Patient departed property in her personal vehicle. Patient asked to be escorted to her car 
in [facility] to load some of her belongigns she did not want to take with her to [place]. She 
then got in to the car and left. SHe left with her IV intact. MD Fulton and charge nurse Anamika 
were informed. A report was made to the [facility] department and [facility] was made aware 
of the incident. 

-------------------------------------------------------------------------------

Addendum: 10/17/19 at 1843 by TERRI COLE RN RN

-------------------------------------------------------------------------------

Nursing supervisor Guerrero and director Jillian Guillory were also made aware. An incident report was 
made.

## 2019-10-17 NOTE — NUR
NURSE NOTES:

Pt is in bed asleep. Evening meds including pain medication given as ordered. Assisted as 
needed. Bed in low and locked position. Call light within reach. Will continue to monitor.

## 2019-10-18 NOTE — DISCHARGE SUMMARY
Discharge Summary


Discharge Summary


_





DATE OF ADMISSION: 10/14/2019





DATE OF DISCHARGE: 10/17/2019





DISCHARGED BY: Dr. Vogt





REASON FOR ADMISSION: 


58 years old female with past medical history of hypertension, osteoarthritis, 

fibromyalgia, degenerative disc disease, anxiety, depression, bipolar disorder, 

presented to emergency department with complaint of retrosternal chest pain, 

moderate, intermittent, lasting minutes. 


Patient reported shortness of breath and tightness in her chest. 


Patient has a strong family history of cardiac disease: mother had CHF. 


Her last stress test was 3 years ago. 


Patient denied diaphoresis. No nausea or vomiting. 


Patient presented from her primary medical doctor office. 


Upon evaluation laboratory work-up revealed no leukocytosis 


Troponin negative. pro . Total CK 67. EKG revealed sinus bradycardia 

with heart rate 55 , no acute ischemic changes.


Albumin 3.2. 


Lipase and LFT within normal limits. 


Chest x-ray revealed no acute cardiopulmonary pathology. 


Patient admitted for chest pain for further evaluation and management.





CONSULTANTS:


cardiologist Dr. De La Fuente


hospitalist Dr. Arellano


psychiatrist 





[facility] COURSE: 


Patient admitted to telemetry floor. 


Cardiologist followed. 


Serial troponin were negative. EKG revealed no acute ischemic changes. 


Patient was ruled out for acute myocardial infarction.


Echocardiogram demonstrated ejection fraction of 60% , no evidence of left 

ventricular hypertrophy. 


No evidence of pericardial effusion. No evidence of wall motion abnormality. 


Right ventricular systolic pressure of 20. 


Lipid panel was stable. 


Patient subsequently undergone myocardial perfusion scan test, which was 

nonischemic with calculated post stress ejection fraction greater than 70%. 


Per reviewing her records from [facility] , patient had fibromyalgia 

as well as the history of multiply somatic complaints. 


Per cardiologist, chest pain was atypical, likely musculoskeletal as it was 

reproducible on palpation .


Cardiologist recommended use of nonsteroidal anti-inflammatory medications for 

pain relief. 


Pain management was addressed, and pain was controlled.


Supplemental oxygen titrated to keep pulse oximetry above 92%. 


Pulmonary toilet with bronchodilator was on board as needed.


Antiplatelet therapy with aspirin continued.


Blood pressure was managed with current medication regimen and remained stable.


DVT prophylaxis provided.


Patient reported that she was unable to ambulate due to pain in the left knee. 


Patient subsequently undergone x-ray of the left knee, which revealed no 

suprapatellar effusion , no acute fracture , no dislocation , preserved joint 

space.


Patient also undergone x-ray of the left ankle , which revealed no evidence of 

acute fracture or dislocation and preserved joint spaces. 


Fall precaution maintained .


Patient started to work with physical therapist. 


Pain management was addressed .


Supportive care provided .


Bowel regimen instituted .


Patient was able to ambulate with help of physical therapist, who recommended 

short term rehabilitation services. 





Psychiatrist seen and evaluated patient , and diagnosed patient with major 

depressive disorder . 


Cymbalta was discontinued .


Patient started on Wellbutrin . Psychiatrist recommended continue Seroquel and 

trazodone. 


Reality orientation and supportive therapy provided. 


Patient clinically stabilized and was ready for discharge to the skilled 

nursing facility for short term rehabilitation. 





FINAL DIAGNOSES: 


Chest pain , likely musculoskeletal/reproducible on palpation


Obesity, status post gastric bypass surgery 


Fibromyalgia


Hypertension


Somatic symptom disorder 


Intractable neuropathic pain left knee 


DJD left knee


Major depressive disorder 


Bipolar disorder








DISCHARGE MEDICATIONS:


See Medication Reconciliation list.





DISCHARGE INSTRUCTIONS:


Patient was discharged to the skilled nursing facility for short rehabilitation.


Follow up with medical doctor at the facility.





I have been assigned to dictate discharge summary for this account. 


I was not involved in the patient's management.











Nathalie Porter NP Oct 18, 2019 10:16

## 2019-10-21 NOTE — CARDIOLOGY REPORT
--------------- APPROVED REPORT --------------





EKG Measurement

Heart [id number]

MA [id number]

[id number]

[id number]

[id number]





Sinus bradycardia

Cannot rule out Anterior infarct, age undetermined

Abnormal ECG

## 2020-01-29 NOTE — DISCHARGE SUMMARY
Discharge Summary


Discharge Summary


_


DATE OF ADMISSION: 07/28/2018





DATE OF DISCHARGE:07/31/ 2018





REASON FOR ADMISSION: 


57 years old female with past medical history of hypertension, fibromyalgia, 

bipolar disorder, degenerative disc disease ,lumbar spine;  anxiety , undergone 

on 7/ 26 arthroscopic partial medial meniscectomy .


Patient presented with increased left knee pain and reports of swelling. 


Patient reported that she had elevated the leg and  was putting ice as 

recommended postoperatively.


Patient also had analgesics prescribed,  however she continued to have pain and

  was not able to eat due to pain. 


Upon evaluation vital signs were stable.


Left knee by itself did not show any swelling or erythema.  


Patient appeared to have difficulty obtaining pain control.  


Patient admitted with diagnoses of left knee pain,  postoperative pain 





CONSULTANTS:


pulmonary Dr. Arellano


psychiatrist 


 


[facility] COURSE: 


Patient admitted to medical surgical floor.  


Patient started on gentle IV fluids.  


Pain management was provided.  


Patient started to work with  physical and occupational therapists.


Fall  precautions were  maintained.  


Patient lives alone and at this time  had difficulty to take care for herself 

at home . 


Case management was consulted to find short-term placement at the skilled 

nursing facility for  rehabilitation.  


Home medications were continued.  


Blood pressure was managed with ARB and was stable. 


Bowel regimen instituted.  


Psychiatrist seen and evaluated the patient,  diagnosed patient with  bipolar 

disorder and anxiety disorder , and optimized psychiatric medication regimen.


Pain controlled. 


Patient was able to tolerate diet. 


Patient worked and ambulated with therapists.


No evidence of infection postoperatively. 


Patient clinically improved and was stable for discharge to skilled nursing 

facility for short-term rehabilitation





FINAL DIAGNOSES: 


Left knee pain 


postoperative pain 


status post recent partial arthroscopic medial meniscectomy


hypertension 


degenerative disc disease , lumbar 


fibromyalgia 


bipolar 1 disorder


anxiety disorder 





DISCHARGE MEDICATIONS:


See Medication Reconciliation list.





DISCHARGE INSTRUCTIONS:


Patient was discharged to skilled nursing facility for short-term rehabilitation





I have been assigned to dictate discharge summary for this account. I was not 

involved in the patient's management.











Nathalie Porter NP Aug 2, 2018 09:45
Transported in [place]

## 2021-01-21 ENCOUNTER — HOSPITAL ENCOUNTER (INPATIENT)
Dept: HOSPITAL 72 - EMR | Age: 61
LOS: 4 days | Discharge: HOME | DRG: 493 | End: 2021-01-25
Payer: MEDICARE

## 2021-01-21 VITALS — HEIGHT: 67 IN | BODY MASS INDEX: 45.99 KG/M2 | WEIGHT: 293 LBS

## 2021-01-21 DIAGNOSIS — F15.10: ICD-10-CM

## 2021-01-21 DIAGNOSIS — F31.9: ICD-10-CM

## 2021-01-21 DIAGNOSIS — M79.7: ICD-10-CM

## 2021-01-21 DIAGNOSIS — M51.36: ICD-10-CM

## 2021-01-21 DIAGNOSIS — I24.9: ICD-10-CM

## 2021-01-21 DIAGNOSIS — Z88.8: ICD-10-CM
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
## 2021-01-21 DIAGNOSIS — S93.491A: ICD-10-CM

## 2021-01-21 DIAGNOSIS — Z88.0: ICD-10-CM

## 2021-01-21 DIAGNOSIS — Z88.1: ICD-10-CM

## 2021-01-21 DIAGNOSIS — G57.92: ICD-10-CM

## 2021-01-21 DIAGNOSIS — R07.9: ICD-10-CM

## 2021-01-21 DIAGNOSIS — I10: ICD-10-CM

## 2021-01-21 DIAGNOSIS — M19.90: ICD-10-CM

## 2021-01-21 DIAGNOSIS — S82.861A: Primary | ICD-10-CM

## 2021-01-21 DIAGNOSIS — E66.9: ICD-10-CM

## 2021-01-21 LAB
ADD MANUAL DIFF: NO
ANION GAP SERPL CALC-SCNC: 7 MMOL/L (ref 5–15)
APTT BLD: 28 SEC (ref 23–33)
BASOPHILS NFR BLD AUTO: 0.9 % (ref 0–2)
BUN SERPL-MCNC: 26 MG/DL (ref 7–18)
CALCIUM SERPL-MCNC: 9 MG/DL (ref 8.5–10.1)
CHLORIDE SERPL-SCNC: 101 MMOL/L (ref 98–107)
CO2 SERPL-SCNC: 28 MMOL/L (ref 21–32)
CREAT SERPL-MCNC: 1.3 MG/DL (ref 0.55–1.3)
EOSINOPHIL NFR BLD AUTO: 1.9 % (ref 0–3)
ERYTHROCYTE [DISTWIDTH] IN BLOOD BY AUTOMATED COUNT: 17.3 % (ref 11.6–14.8)
HCT VFR BLD CALC: 38.3 % (ref 37–47)
HGB BLD-MCNC: 11.6 G/DL (ref 12–16)
INR PPP: 0.9 (ref 0.9–1.1)
LYMPHOCYTES NFR BLD AUTO: 25 % (ref 20–45)
MCV RBC AUTO: 90 FL (ref 80–99)
MONOCYTES NFR BLD AUTO: 7.6 % (ref 1–10)
NEUTROPHILS NFR BLD AUTO: 64.7 % (ref 45–75)
PLATELET # BLD: 365 K/UL (ref 150–450)
POTASSIUM SERPL-SCNC: 4.1 MMOL/L (ref 3.5–5.1)
RBC # BLD AUTO: 4.26 M/UL (ref 4.2–5.4)
SODIUM SERPL-SCNC: 136 MMOL/L (ref 136–145)
WBC # BLD AUTO: 7.6 K/UL (ref 4.8–10.8)

## 2021-01-21 PROCEDURE — 80048 BASIC METABOLIC PNL TOTAL CA: CPT

## 2021-01-21 PROCEDURE — 80053 COMPREHEN METABOLIC PANEL: CPT

## 2021-01-21 PROCEDURE — 0MQQ0ZZ REPAIR RIGHT ANKLE BURSA AND LIGAMENT, OPEN APPROACH: ICD-10-PCS

## 2021-01-21 PROCEDURE — 85610 PROTHROMBIN TIME: CPT

## 2021-01-21 PROCEDURE — 94150 VITAL CAPACITY TEST: CPT

## 2021-01-21 PROCEDURE — 84100 ASSAY OF PHOSPHORUS: CPT

## 2021-01-21 PROCEDURE — 36415 COLL VENOUS BLD VENIPUNCTURE: CPT

## 2021-01-21 PROCEDURE — 99285 EMERGENCY DEPT VISIT HI MDM: CPT

## 2021-01-21 PROCEDURE — 96375 TX/PRO/DX INJ NEW DRUG ADDON: CPT

## 2021-01-21 PROCEDURE — 96374 THER/PROPH/DIAG INJ IV PUSH: CPT

## 2021-01-21 PROCEDURE — 85730 THROMBOPLASTIN TIME PARTIAL: CPT

## 2021-01-21 PROCEDURE — 71045 X-RAY EXAM CHEST 1 VIEW: CPT

## 2021-01-21 PROCEDURE — 87086 URINE CULTURE/COLONY COUNT: CPT

## 2021-01-21 PROCEDURE — 0QSJ04Z REPOSITION RIGHT FIBULA WITH INTERNAL FIXATION DEVICE, OPEN APPROACH: ICD-10-PCS

## 2021-01-21 PROCEDURE — 94003 VENT MGMT INPAT SUBQ DAY: CPT

## 2021-01-21 PROCEDURE — 96361 HYDRATE IV INFUSION ADD-ON: CPT

## 2021-01-21 PROCEDURE — 85025 COMPLETE CBC W/AUTO DIFF WBC: CPT

## 2021-01-21 PROCEDURE — 96372 THER/PROPH/DIAG INJ SC/IM: CPT

## 2021-01-21 PROCEDURE — 83735 ASSAY OF MAGNESIUM: CPT

## 2021-01-21 PROCEDURE — 76000 FLUOROSCOPY <1 HR PHYS/QHP: CPT

## 2021-01-21 NOTE — EMERGENCY ROOM REPORT
History of Present Illness


General


Chief Complaint:  To Be Triaged


Source:  Patient, Medical Record





Present Illness


HPI


This is a 60-year-old female with a history of high blood pressure.  She 

presents with chief complaint of ankle pain.  She hospital and had x-ray done.  

It showed that she has a fell and twisted her ankle this morning.  She was seen 

at [place] for fracture.  She was placed in a sugar tong splint.  Initially 

she was told that she  may need surgery  But the orthopedic surgeon there was 

both.  She spoke with Dr. Fletcher, orthopedic surgeon who told her to call here 

to be seen.  She complained of pain of 10 out of 10.  Worse with movement.  

Better with rest.  No nausea no vomiting.  No other injury.


Allergies:  


Coded Allergies:  


     LAMOTRIGINE (Verified  Allergy, Mild, Hives, 10/5/18)


     PENICILLINS (Verified  Allergy, Mild, Hives, 10/5/18)


     ERYTHROMYCIN BASE (Unverified  Allergy, Unknown, Rash, 10/5/18)


     PREGABALIN (Verified  Allergy, Unknown, 10/5/18)


   SWELLING


     SERTRALINE (Verified  Allergy, Unknown, 10/5/18)


     TRAMADOL HCL (Verified  Allergy, Unknown, 2/25/15)


   VOMIT





COVID-19 Screening


Contact w/high risk pt:  No


Recent Travel to affected area:  No


Experienced COVID-19 symptoms?:  No





Patient History


Past Medical History:  see triage record, old chart reviewed, HTN


Past Surgical History:  other


Pertinent Family History:  none


Social History:  Denies: smoking


Pregnant Now:  No


Immunizations:  other


Reviewed Nursing Documentation:  PMH: Agreed; PSxH: Agreed





Nursing Documentation-PMH


Hx Cardiac Problems:  Yes - osteoarthoritis


Hx Hypertension:  Yes


Hx Cancer:  No


Hx Gastrointestinal Problems:  Yes


Hx Neurological Problems:  Yes - Anxiety, depression


Hx Transient Ischemic Attacks:  Yes


Hx Syncope:  Yes





Review of Systems


Eye:  Denies: eye pain, blurred vision


ENT:  Denies: ear pain, nose congestion, throat swelling


Respiratory:  Denies: cough, shortness of breath


Cardiovascular:  Denies: chest pain, palpitations


Gastrointestinal:  Denies: abdominal pain, diarrhea, nausea, vomiting


Musculoskeletal:  Reports: joint pain; Denies: back pain


Skin:  Denies: rash


Neurological:  Denies: headache, numbness


Endocrine:  Denies: increased thirst, increased urine


Hematologic/Lymphatic:  Denies: easy bruising


All Other Systems:  negative except mentioned in HPI





Physical Exam


Vitals unremarkable


Sp02 EP Interpretation:  reviewed, normal


General Appearance:  well appearing, no apparent distress, alert, obese


Head:  normocephalic, atraumatic


Eyes:  bilateral eye PERRL, bilateral eye EOMI


ENT:  hearing grossly normal, normal pharynx


Neck:  full range of motion, supple, no meningismus


Respiratory:  chest non-tender, lungs clear, normal breath sounds


Cardiovascular #1:  regular rate, rhythm, no murmur


Gastrointestinal:  normal bowel sounds, non tender, no mass, no organomegaly, no

bruit, non-distended


Musculoskeletal:  back normal, normal range of motion, other -  patient has a 

short leg sugar tong splint on her right lower extremity


Psychiatric:  mood/affect normal





Medical Decision Making


Diagnostic Impression:  


   Primary Impression:  


   Maisonneuve fracture of right lower extremity


   Qualified Codes:  S82.861A - Displaced Maisonneuve's fracture of right leg, 

   initial encounter for closed fracture


ER Course


Patient presents with a fall and has a Maisonneuve fracture.  She is already 

been splinted at [facility].  I discussed the case with Dr. Fletcher who 

wants her to be admitted for surgery.  I contacted Dr. Vogt for admission.


Other X-Ray Diagnostic Results


Other X-Ray Diagnostic Results #1:  


   X-Ray ordered:  Knee x-rays, right


   # of Views/Limited Vs Complete:  Complete


   Indication:  Pain


   EP Interpretation:  Yes


   Interpretation:  no dislocation, no soft tissue swelling, other - Proximal 

fibula fracture


   Impression:  Other - Proximal fibula fracture


   Electronically Signed by:  Jorge Luis Azevedo MD


Other X-Ray Diagnostic Results #2:  


   X-Ray ordered:  Right ankle x-rays


   # of Views/Limited Vs Complete:  3 View


   Indication:  Pain


   EP Interpretation:  Yes


   Interpretation:  no dislocation, no soft tissue swelling, other - Posterior 

avulsion fracture of the tibia with widening of the mortise


   Impression:  Other - distal tibia frx


   Electronically Signed by:  Jorge Luis Azevedo MD


Status:  improved


Disposition:  ADMITTED AS INPATIENT


Condition:  Serious











Jorge Luis Azevedo MD                  Jan 21, 2021 22:08

## 2021-01-21 NOTE — NUR
ED Nurse Note:



Recieved pt walk in, assisted from car, pt drove self here, pt has c/o right 
ankle and leg fracture, pt just deove from [facility] where she had 
x-rays and was splinted, pt states was told to come here for possible needed 
surgery, pt is awake and alert, wheelchaired in facility, pt constantly asking 
for dilaudid pain meds even before going to bed, states she needs ti before she 
can do anything and refusing all other pain meds, pt immediately assisted to 
bed and gowning, placed on cardiac monitoring, pt is very demanding and has 
many request, only wants b/p on wrist, wont allow o2 sat to stay on, MD 
immediately at bedside, will resume care as ordered and continue to closely 
monitor.

## 2021-01-22 VITALS — SYSTOLIC BLOOD PRESSURE: 128 MMHG | DIASTOLIC BLOOD PRESSURE: 69 MMHG

## 2021-01-22 VITALS — DIASTOLIC BLOOD PRESSURE: 66 MMHG | SYSTOLIC BLOOD PRESSURE: 138 MMHG

## 2021-01-22 VITALS — SYSTOLIC BLOOD PRESSURE: 129 MMHG | DIASTOLIC BLOOD PRESSURE: 66 MMHG

## 2021-01-22 VITALS — DIASTOLIC BLOOD PRESSURE: 76 MMHG | SYSTOLIC BLOOD PRESSURE: 129 MMHG

## 2021-01-22 VITALS — DIASTOLIC BLOOD PRESSURE: 68 MMHG | SYSTOLIC BLOOD PRESSURE: 135 MMHG

## 2021-01-22 VITALS — SYSTOLIC BLOOD PRESSURE: 117 MMHG | DIASTOLIC BLOOD PRESSURE: 72 MMHG

## 2021-01-22 VITALS — DIASTOLIC BLOOD PRESSURE: 70 MMHG | SYSTOLIC BLOOD PRESSURE: 118 MMHG

## 2021-01-22 VITALS — DIASTOLIC BLOOD PRESSURE: 61 MMHG | SYSTOLIC BLOOD PRESSURE: 119 MMHG

## 2021-01-22 VITALS — SYSTOLIC BLOOD PRESSURE: 140 MMHG | DIASTOLIC BLOOD PRESSURE: 64 MMHG

## 2021-01-22 VITALS — SYSTOLIC BLOOD PRESSURE: 94 MMHG | DIASTOLIC BLOOD PRESSURE: 56 MMHG

## 2021-01-22 VITALS — SYSTOLIC BLOOD PRESSURE: 144 MMHG | DIASTOLIC BLOOD PRESSURE: 62 MMHG

## 2021-01-22 VITALS — SYSTOLIC BLOOD PRESSURE: 91 MMHG | DIASTOLIC BLOOD PRESSURE: 57 MMHG

## 2021-01-22 VITALS — SYSTOLIC BLOOD PRESSURE: 143 MMHG | DIASTOLIC BLOOD PRESSURE: 69 MMHG

## 2021-01-22 VITALS — SYSTOLIC BLOOD PRESSURE: 140 MMHG | DIASTOLIC BLOOD PRESSURE: 56 MMHG

## 2021-01-22 VITALS — DIASTOLIC BLOOD PRESSURE: 58 MMHG | SYSTOLIC BLOOD PRESSURE: 139 MMHG

## 2021-01-22 VITALS — DIASTOLIC BLOOD PRESSURE: 56 MMHG | SYSTOLIC BLOOD PRESSURE: 101 MMHG

## 2021-01-22 VITALS — DIASTOLIC BLOOD PRESSURE: 58 MMHG | SYSTOLIC BLOOD PRESSURE: 101 MMHG

## 2021-01-22 VITALS — SYSTOLIC BLOOD PRESSURE: 121 MMHG | DIASTOLIC BLOOD PRESSURE: 63 MMHG

## 2021-01-22 VITALS — DIASTOLIC BLOOD PRESSURE: 77 MMHG | SYSTOLIC BLOOD PRESSURE: 116 MMHG

## 2021-01-22 RX ADMIN — HEPARIN SODIUM SCH UNITS: 5000 INJECTION INTRAVENOUS; SUBCUTANEOUS at 21:48

## 2021-01-22 RX ADMIN — HUMAN INSULIN SCH MLS/HR: 100 INJECTION, SOLUTION SUBCUTANEOUS at 04:00

## 2021-01-22 RX ADMIN — MORPHINE SULFATE PRN MG: 4 INJECTION INTRAVENOUS at 18:26

## 2021-01-22 RX ADMIN — OXYCODONE HYDROCHLORIDE SCH MG: 20 TABLET, FILM COATED, EXTENDED RELEASE ORAL at 21:47

## 2021-01-22 RX ADMIN — SODIUM CHLORIDE SCH MLS/HR: 0.9 INJECTION INTRAVENOUS at 21:45

## 2021-01-22 RX ADMIN — HEPARIN SODIUM SCH UNITS: 5000 INJECTION INTRAVENOUS; SUBCUTANEOUS at 06:04

## 2021-01-22 RX ADMIN — HEPARIN SODIUM SCH UNITS: 5000 INJECTION INTRAVENOUS; SUBCUTANEOUS at 13:23

## 2021-01-22 RX ADMIN — MORPHINE SULFATE PRN MG: 2 INJECTION, SOLUTION INTRAMUSCULAR; INTRAVENOUS at 23:19

## 2021-01-22 RX ADMIN — HUMAN INSULIN SCH MLS/HR: 100 INJECTION, SOLUTION SUBCUTANEOUS at 13:23

## 2021-01-22 RX ADMIN — HUMAN INSULIN SCH MLS/HR: 100 INJECTION, SOLUTION SUBCUTANEOUS at 17:22

## 2021-01-22 RX ADMIN — DOCUSATE SODIUM SCH MG: 100 CAPSULE, LIQUID FILLED ORAL at 18:00

## 2021-01-22 NOTE — NUR
59 y/o female walked in from home



CC: reported fractured Rt Ankle and leg after fall, stated she was here for AM surgery also 
c/o severe pain, stated drove from [facility] where where she had x rays and was 
splinted





SI: Maisonneuve Fx of RT LE

     T-98.7, HR 67, RR 16, /61 O2 sat 97%

     Ankle x ray.  Fx of posterior and lateral malleoli

     Knee x ray non displaced fx of proximal fibular shaft.







IS: Hydromorphone IVP X 3

     Zofran IVP X 2

     NACL Bolus

     NS IV @ 125cc/hr



Plan:  Orthopedic consult





*****Admit to med-surg*****

*****Med/Surg Status*****



DCP: Home pending hospitalization

## 2021-01-22 NOTE — CONSULTATION
DATE OF CONSULTATION:  01/21/2021

CHIEF COMPLAINT:  Right ankle pain.



HISTORY OF PRESENT ILLNESS:  The patient is a 60-year-old female who is

well known to me, who sustained a fall while getting out of bed.  She kind

of landed awkwardly, has significant pain, discomfort, difficulty

ambulating.  She went to [facility], was diagnosed with

ankle fracture, and then presented here in the [facility] for

definitive treatment.



PAST MEDICAL HISTORY:  Reviewed per intake chart.



PAST SURGICAL HISTORY:  Reviewed per intake chart.



MEDICATIONS:  Reviewed per intake chart.



PHYSICAL EXAMINATION:

GENERAL:  The patient's brace is placed on the right ankle.  The patient is

resting comfortably on exam bed.

VITAL SIGNS:  Afebrile.  Stable vital signs.

EXTREMITIES:  Right ankle examination shows splint in place.  Dorsalis

pedis +2.



Imaging study showed ligamentous ankle fracture dislocation of right

ankle _____ equivalent.



DISCUSSION:  At this point, there is an unstable ankle.  What I recommend

is to go and proceed with the ORIF of the right ankle with open repair of

the syndesmosis.  Risks, limitations, expectations, and complications of

the procedure were discussed in detail.  She is going to be admitted, made

NPO after midnight in anticipation of surgery.  She is going to be seen by

Dr. Vogt for medical clearance.  She understands surgery as well as the

postop and rehab protocol.  She understands specifically the risks

involved including nonunion, infection, need for future surgery, as well

as DVT and mortality risks.  All questions were addressed.  We will go

ahead and coordinate planning and scheduling of the surgery for tomorrow

if she is medically optimized.









  ______________________________________________

  Donn Fletcher M.D.





DR:  BECCA

D:  01/22/2021 12:23

T:  01/22/2021 18:03

JOB#:  [id number]

CC:

## 2021-01-22 NOTE — NUR
ED Nurse Note:



Pt has room for admission, report cfalled to NikiRN by GeriRN, all 
belonging with patient and list completed, pt refuses to allow to see contents 
of purse or for belongings to go to safe, pt demanded for more pain meds before 
going, pt also reinformed of NPO status, IV site patent, pt being taken to 
floor unit via karin with RN and ER-Tech, doris guerradring pt admission.

## 2021-01-22 NOTE — NUR
NURSE HAND-OFF: 



Important Events on Shift:OR at 1150, returned at 1625, SCD to LLE needs to be applied, 
apply ice pack to right ankle when patient tolerates it (patient wanted to take a break from 
the ice pack at 1830), medicated with Dilaudid 0.5 SQx1 and MS 4 mg IV x1

Patient Status: stable

Diet: Regular



Pending Orders: CM to arrange HH

Pending Results/Labs:none

Pending MD notification:none



Latest Vital Signs: Temperature 97.3 , Pulse 62 , B/P 129 /76 , Respiratory Rate 22 , O2 SAT 
100 , Nasal Cannula, O2 Flow Rate 3 .  

Vital Sign Comment: none



Latest Abrams Fall Score: 80  

Fall Risk: High Risk 

Safety Measures: Call light Within Reach, Bed Alarm , Side Rails Side Rails x2, Bed position 
Low and Locked.

Fall Precautions: 

Yellow Socks

Yellow Gown

Door Sign

Patient Fall Education



Report given to Sahra VÁSQUEZ.

## 2021-01-22 NOTE — DIAGNOSTIC IMAGING REPORT
EXAM: X-RAY XRAY Knee 3v R

 

CLINICAL HISTORY: Trauma with knee pain. 

 

COMPARISON:  None

 

FINDINGS:  Total of 3 views of the right knee were obtained. 

 

Alignment is anatomic. There is a fracture of the proximal fibular shaft.

Degenerative joint space narrowing and osteophyte formation noted. Surrounding soft

tissue is normal.

 

IMPRESSION:

 

NONDISPLACED FRACTURE PROXIMAL FIBULAR SHAFT.

 

DEGENERATIVE CHANGES OF THE KNEE.

## 2021-01-22 NOTE — PRE-PROCEDURE NOTE/ATTESTATION
Pre-Procedure Note/Attestation


Complete Prior to Procedure


Planned Procedure:  right


Procedure Narrative:


ankle orif and repair of syndesmosis





Indications for Procedure


Pre-Operative Diagnosis:


right ankle fracture





Attestation


I attest that I discussed the nature of the procedure; its benefits; risks and 

complications; and alternatives (and the risks and benefits of such 

alternatives), prior to the procedure, with the patient (or the patient's legal 

representative).





I attest that, if there was a reasonable possibility of needing a blood 

transfusion, the patient (or the patient's legal representative) was given the 

[facility] standardized written summary, pursuant 

to the [place] Blood Safety Act ([place] Health and Safety Code # 1645, as 

amended).





I attest that I re-evaluated the patient just prior to the surgery and that 

there has been no change in the patient's H&P, except as documented below:











Donn Fletcher MD        Jan 22, 2021 12:22

## 2021-01-22 NOTE — DIAGNOSTIC IMAGING REPORT
EXAM: X-RAY XRAY Ankle Compl Min 3v R

 

CLINICAL HISTORY: Trauma with ankle pain. 

 

COMPARISON:  None

 

FINDINGS:  Total of 3 views of the right ankle were obtained. 

 

Post reduction images obtained with ankle in splint. On the lateral view, cortical

step-off noted involving the posterior malleolus as well as the distal fibula/lateral

malleolus noted. These fractures are not well seen on the frontal projection. Ankle

mortise grossly anatomic as well as subtalar joint. There is soft tissue swelling.

 

IMPRESSION:

 

FRACTURES OF THE POSTERIOR AND LATERAL MALLEOLI ONLY APPRECIATED ON THE LATERAL VIEW.

## 2021-01-22 NOTE — NUR
NURSE NOTES:

Blood and surgical consent reviewed with patient, signed. Pre op checklist done. Emergency 
contact updated on Face Sheet, no H&P in chart, however patient is familiar with Dr. Vogt 
and Dr. Fletcher. Will follow up on H&P. 

-------------------------------------------------------------------------------

Addendum: 01/22/21 at 1112 by Kaylee Reynolds RN

-------------------------------------------------------------------------------

Copies of blood and surgical consent given to patient per patient request.

## 2021-01-22 NOTE — CONSULTATION
History of Present Illness


General


Date patient seen:  Jan 22, 2021


Reason for Hospitalization:  Lower Extremity Injury





Present Illness


HPI


60-year-old female with a history of high blood pressure.  She presents with 

chief complaint of ankle pain.  She hospital and had x-ray done.  It showed that

she has a fell and twisted her ankle this morning.  She was seen at [place] 

for fracture.  She was placed in a sugar tong splint.  Initially she was told 

that she  may need surgery  But the orthopedic surgeon there was both.  She 

spoke with Dr. Fletcher, orthopedic surgeon who told her to call here to be seen. 

She complained of pain of 10 out of 10.  Worse with movement.  Better with rest.

 No nausea no vomiting.  No other injury. surgery called to evaluate for fall.  

planned for ortho surgery today.  states pain.


Allergies:  


Coded Allergies:  


     LAMOTRIGINE (Verified  Allergy, Mild, Hives, 10/5/18)


     PENICILLINS (Verified  Allergy, Mild, Hives, 10/5/18)


     ERYTHROMYCIN BASE (Unverified  Allergy, Unknown, Rash, 10/5/18)


     PREGABALIN (Verified  Allergy, Unknown, 10/5/18)


   SWELLING


     SERTRALINE (Verified  Allergy, Unknown, 10/5/18)


     TRAMADOL HCL (Verified  Allergy, Unknown, 2/25/15)


   VOMIT





COVID-19 Screening


Contact w/high risk pt:  No


Recent Travel to affected area:  No


Experienced COVID-19 symptoms?:  No





Medication History


Scheduled


Acetaminophen* (Tylenol Extra Strength*), 1,000 MG ORAL Q6H


Diclofenac Sodium (Voltaren), 2 GM TP TID


Duloxetine Hcl* (Cymbalta*), 30 MG PO BEDTIME, (Reported)


Duloxetine Hcl* (Cymbalta*), 60 MG PO DAILY, (Reported)


Solifenacin Succinate (Vesicare*), 10 MG ORAL DAILY, (Reported)


Trazodone* (Trazodone*), 300 MG ORAL BEDTIME, (Reported)


Valsartan (Diovan), 160 MG ORAL BID, (Reported)





Scheduled PRN


Lorazepam* (Ativan*), 1 MG ORAL Q8HR PRN for For Pain, (Reported)


Ondansetron (Zofran), 4 MG ORAL Q6H PRN for Nausea & Vomiting


Zolpidem Tartrate* (Ambien*), 10 MG ORAL HS PRN for Insomnia, (Reported)





Miscellaneous Medications


Unable to Obtain Medications (Unable To Obtain Meds), (Reported)





Patient History


History Provided By:  Patient


Healthcare decision maker





Resuscitation status





Advanced Directive on File








Past Medical/Surgical History


Past Medical/Surgical History:  


(1) Hypertension


(2) Bipolar depression


(3) DJD (degenerative joint disease) of knee


(4) ACS (acute coronary syndrome)


(5) Left knee pain


(6) Left ankle pain


(7) Maisonneuve fracture of right lower extremity


(8) Fibromyalgia


(9) HTN (hypertension)


(10) Degenerative disc disease, lumbar


(11) Bipolar 1 disorder, depressed


(12) Acute coronary syndrome


(13) Methamphetamine abuse


(14) Right knee pain


(15) Osteoarthritis of left knee


(16) Obesity (BMI 35.0-39.9 without comorbidity)


(17) Intractable neuropathic pain of left knee


(18) Chest pain of uncertain etiology





Review of Systems


Review of Symptoms


General ROS: no weight loss or fever


Psychological ROS: no depression or mood changes, no memory loss


Ophthalmic ROS: no visual changes or eye irritation


ENT ROS: no nasal congestion, hearing loss, dizziness


Allergy and Immunology ROS: no allergic symptoms or urticaria


Hematological and Lymphatic ROS: no swollen glands, unusual bleeding or bruising


Endocrine ROS: no polyuria, polydipsia, weight changes, temperature intolerance


Respiratory ROS: no cough, shortness of breath, or wheezing


Cardiovascular ROS: no chest pain or dyspnea on exertion


Gastrointestinal ROS: denies abdominal pain, bright red blood in stool.


Musculoskeletal ROS: no myalgias or arthralgias


Neurological ROS: no TIA or stroke symptoms


Dermatological ROS: no new or changing skin lesions, rashes or pruritis





Physical Exam


Physical Exam


General appearance:  alert, cooperative, no distress, appears stated age


Head:  Normocephalic, without obvious abnormality, atraumatic


Eyes:  conjunctivae/corneas clear. PERRL, EOM's intact. Fundi benign


Throat:  Lips, mucosa, and tongue normal. Teeth and gums normal


Neck:  supple, symmetrical, trachea midline, no adenopathy, thyroid: not 

enlarged, symmetric, no tenderness/mass/nodules, no carotid bruit and no JVD


Lungs:  clear to auscultation bilaterally


Heart:  regular rate and rhythm, S1, S2 normal, no murmur, click, rub or gallop


Abdomen:  soft, non-tender. Bowel sounds normal. No masses,  no organomegaly


Extremities:  extremities normal, atraumatic, no cyanosis or edema. right in 

splint 


Pulses:  2+ and symmetric


Skin:  Skin color, texture, turgor normal. No rashes or lesions


Neurologic:  Grossly normal





Last 24 Hour Vital Signs








  Date Time  Temp Pulse Resp B/P (MAP) Pulse Ox O2 Delivery O2 Flow Rate FiO2


 


1/22/21 16:20 97.3 62 22 129/76 100 Nasal Cannula 3 


 


1/22/21 16:11 97.4       


 


1/22/21 16:10  63 24 128/69 100 Nasal Cannula 3 


 


1/22/21 15:55  60 24 138/66 100 Nasal Cannula 3 


 


1/22/21 15:40  64 24 129/66 100 Simple Mask 6 


 


1/22/21 15:25  68 22 135/68 100 Simple Mask 6 


 


1/22/21 15:10  59 20 144/62 100 Simple Mask 6 


 


1/22/21 15:00  60 20 139/58 100 Simple Mask 6 


 


1/22/21 14:55  63 17 140/56 100 Simple Mask 6 


 


1/22/21 14:50  67 18 143/69 100 Simple Mask 6 


 


1/22/21 14:49  69 16  100   


 


1/22/21 14:46 97.3 66 14 140/64 98 Simple Mask 6 


 


1/22/21 11:25 98.9 65 21 101/56 (71) 98   


 


1/22/21 09:00      Room Air  


 


1/22/21 08:00 98.8 64 19 101/58 (72) 99   


 


1/22/21 08:00 98.8 64 19  99   





  64      


 


1/22/21 04:00 98.2 72 18 94/56 (69) 96   


 


1/22/21 03:00      Room Air  


 


1/22/21 01:00 97.9 67 18 91/57 (68) 95   


 


1/22/21 00:50 98.7 67 16 119/61 98 Room Air  


 


1/22/21 00:00 98.7 67 16 119/61 98 Room Air  


 


1/21/21 22:57 98.8       


 


1/21/21 22:12 98.8 72 20 117/58 (77) 96 Room Air  

















Intake and Output  


 


 1/21/21 1/22/21





 19:00 07:00


 


Intake Total  100 ml


 


Output Total  0 ml


 


Balance  100 ml


 


  


 


Intake IV Total  100 ml


 


Output Urine Total  0 ml


 


# Voids  1











Laboratory Tests








Test


 1/21/21


23:15


 


White Blood Count


 7.6 K/UL


(4.8-10.8)


 


Red Blood Count


 4.26 M/UL


(4.20-5.40)


 


Hemoglobin


 11.6 G/DL


(12.0-16.0)  L


 


Hematocrit


 38.3 %


(37.0-47.0)


 


Mean Corpuscular Volume 90 FL (80-99)  


 


Mean Corpuscular Hemoglobin


 27.3 PG


(27.0-31.0)


 


Mean Corpuscular Hemoglobin


Concent 30.3 G/DL


(32.0-36.0)  L


 


Red Cell Distribution Width


 17.3 %


(11.6-14.8)  H


 


Platelet Count


 365 K/UL


(150-450)


 


Mean Platelet Volume


 6.8 FL


(6.5-10.1)


 


Neutrophils (%) (Auto)


 64.7 %


(45.0-75.0)


 


Lymphocytes (%) (Auto)


 25.0 %


(20.0-45.0)


 


Monocytes (%) (Auto)


 7.6 %


(1.0-10.0)


 


Eosinophils (%) (Auto)


 1.9 %


(0.0-3.0)


 


Basophils (%) (Auto)


 0.9 %


(0.0-2.0)


 


Prothrombin Time


 10.4 SEC


(9.30-11.50)


 


Prothromb Time International


Ratio 0.9 (0.9-1.1)  





 


Activated Partial


Thromboplast Time 28 SEC (23-33)





 


Sodium Level


 136 MMOL/L


(136-145)


 


Potassium Level


 4.1 MMOL/L


(3.5-5.1)


 


Chloride Level


 101 MMOL/L


()


 


Carbon Dioxide Level


 28 MMOL/L


(21-32)


 


Anion Gap


 7 mmol/L


(5-15)


 


Blood Urea Nitrogen


 26 mg/dL


(7-18)  H


 


Creatinine


 1.3 MG/DL


(0.55-1.30)


 


Estimat Glomerular Filtration


Rate 51.0 mL/min


(>60)


 


Glucose Level


 209 MG/DL


()  H


 


Calcium Level


 9.0 MG/DL


(8.5-10.1)











Microbiology








 Date/Time


Source Procedure


Growth Status





 


 1/21/21 20:47


Nasopharynx SARS-CoV-2 RdRp Gene Assay - Final Complete








Height (Feet):  5


Height (Inches):  7.00


Weight (Pounds):  310


Medications





Current Medications








 Medications


  (Trade)  Dose


 Ordered  Sig/Tapan


 Route


 PRN Reason  Start Time


 Stop Time Status Last Admin


Dose Admin


 


 Acetaminophen


  (Tylenol)  650 mg  Q6H  PRN


 ORAL


 Mild Pain (Pain Scale 1-3)  1/22/21 07:15


 2/21/21 07:14   





 


 Acetaminophen


  (Tylenol)  650 mg  Q6H  PRN


 ORAL


 Temp >100.5  1/22/21 07:15


 2/21/21 07:14   





 


 Acetaminophen/


 Hydrocodone Bitart


  (Norco 5/325)  1 tab  Q4H  PRN


 ORAL


 For Pain  1/22/21 12:30


 1/29/21 12:29   





 


 Cefazolin Sodium


 1 gm/Dextrose  55 ml @ 


 110 mls/hr  Q8H


 IV


   1/22/21 21:00


 1/23/21 13:29   





 


 Cetirizine HCl


  (ZyrTEC)  10 mg  QHS


 ORAL


   1/22/21 04:00


 4/22/21 03:59  1/22/21 04:29





 


 Dextrose/Sodium


 Chloride  1,000 ml @ 


 150 mls/hr  Q6H40M


 IV


   1/22/21 04:00


 2/21/21 03:59  1/22/21 04:00





 


 Docusate Sodium


  (Colace)  100 mg  THREE TIMES A  DAY


 ORAL


   1/22/21 18:00


 2/21/21 17:59   





 


 Gabapentin


  (Neurontin)  600 mg  BID@0800,1300


 ORAL


   1/22/21 09:15


 2/21/21 08:59  1/22/21 09:37





 


 Gabapentin


  (Neurontin)  1,200 mg  QHS


 ORAL


   1/22/21 21:00


 2/21/21 03:59   





 


 Heparin Sodium


  (Porcine)


  (Heparin 5000


 units/ml)  5,000 units  EVERY 8  HOURS


 SUBQ


   1/22/21 06:00


 3/8/21 05:59  1/22/21 06:04





 


 Hydromorphone HCl


  (Dilaudid)  0.5 mg  Q4H  PRN


 SUBQ


 Mild Pain (Pain Scale 1-3)  1/22/21 12:30


 1/29/21 12:29   





 


 Ondansetron HCl


  (Zofran)  4 mg  Q6H  PRN


 IVP


 Nausea & Vomiting  1/22/21 12:30


 2/21/21 12:29   





 


 Oxycodone HCl


  (OxyCONTIN)  20 mg  EVERY 12  HOURS


 ORAL


   1/22/21 21:00


 1/29/21 20:59   





 


 Temazepam


  (RestoriL)  7.5 mg  HSPRN  PRN


 ORAL


 Insomnia  1/22/21 12:30


 1/29/21 12:29   





 


 Tranexamic Acid  100 ml @ 


 100 mls/hr  ONCE


 IVPB


   1/22/21 14:10


 1/22/21 18:00   





 


 Zolpidem Tartrate


  (Ambien)  5 mg  HSPRN  PRN


 ORAL


 Insomnia  1/22/21 03:30


 1/29/21 03:29   














Assessment/Plan


Problem List:  


(1) Hypertension


ICD Codes:  I10 - Essential (primary) hypertension


SNOMED:  49890899


(2) Bipolar depression


ICD Codes:  F31.9 - Bipolar disorder, unspecified


SNOMED:  814544211


(3) DJD (degenerative joint disease) of knee


ICD Codes:  M17.10 - Unilateral primary osteoarthritis, unspecified knee


SNOMED:  790765365


(4) ACS (acute coronary syndrome)


ICD Codes:  I24.9 - Acute ischemic heart disease, unspecified


SNOMED:  703205370


(5) Left knee pain


ICD Codes:  M25.562 - Pain in left knee


SNOMED:  93236853


(6) Left ankle pain


ICD Codes:  M25.572 - Pain in left ankle and joints of left foot


SNOMED:  19107109, 223402795


(7) Maisonneuve fracture of right lower extremity


Assessment & Plan:  Post reduction images obtained with ankle in splint. On the 

lateral view, cortical


step-off noted involving the posterior malleolus as well as the distal f

ibula/lateral


malleolus noted. These fractures are not well seen on the frontal projection. 

Ankle


mortise grossly anatomic as well as subtalar joint. There is soft tissue 

swelling.


 


IMPRESSION:


 


FRACTURES OF THE POSTERIOR AND LATERAL MALLEOLI ONLY APPRECIATED ON THE LATERAL 

VIEW.





ortho eval


to OR for ORIF 


no further trauma isolated ortho


no head injury


no loc


ICD Codes:  S82.861A - Displaced Maisonneuve's fracture of right leg, initial 

encounter for closed fracture


SNOMED:  99137201, [id number]


Qualifiers:  


   Qualified Codes:  S82.861A - Displaced Maisonneuve's fracture of right leg, 

initial encounter for closed fracture


(8) Fibromyalgia


ICD Codes:  M79.7 - Fibromyalgia


SNOMED:  408912139


(9) HTN (hypertension)


ICD Codes:  I10 - Essential (primary) hypertension


SNOMED:  74670047


(10) Degenerative disc disease, lumbar


ICD Codes:  M51.36 - Other intervertebral disc degeneration, lumbar region


SNOMED:  00965072


(11) Bipolar 1 disorder, depressed


ICD Codes:  F31.9 - Bipolar disorder, unspecified


SNOMED:  93696335


(12) Acute coronary syndrome


ICD Codes:  I24.9 - Acute ischemic heart disease, unspecified


SNOMED:  160577732


(13) Methamphetamine abuse


ICD Codes:  F15.10 - Other stimulant abuse, uncomplicated


SNOMED:  069720863


(14) Right knee pain


ICD Codes:  M25.561 - Pain in right knee


SNOMED:  12228142


(15) Osteoarthritis of left knee


ICD Codes:  M17.12 - Unilateral primary osteoarthritis, left knee


SNOMED:  009537835493181


(16) Obesity (BMI 35.0-39.9 without comorbidity)


ICD Codes:  E66.9 - Obesity, unspecified


SNOMED:  036041775, 682850082


(17) Intractable neuropathic pain of left knee


ICD Codes:  G57.92 - Unspecified mononeuropathy of left lower limb


SNOMED:  997576549


(18) Chest pain of uncertain etiology


ICD Codes:  R07.89 - Other chest pain


SNOMED:  65328488











Yon Kearns                Jan 22, 2021 17:22

## 2021-01-22 NOTE — NUR
NURSE NOTES:

Patient transported by gurney. On room air, breathing is even and unlabored. Complains of 
leg pain 10/10. Belongings list checked. Oriented to hospital room. IV left AC 20g intact 
and patent with no bleeding. VS stable except BP 91/57. Will get admission order from 
.

## 2021-01-22 NOTE — NUR
NURSE NOTES:

Patient returned from PACU via bed on O2 3LNC, in stable condition at 1625 to room 301-2. 
Patient AOx4, talkative. LR infusing to RW, site asymptomatic. RLE in splint wrapped with 
ACE bandage (CDI), neuro checks done, wiggles, skin warm, pulses palpable, no NT. Ice pack 
in place. Denies need for pain medication at this time. Clear liquids provided, no NV. Call 
light in reach, bed in lowest position, will continue to monitor.

## 2021-01-22 NOTE — OPERATIVE NOTE - PDOC
Operative Note


Operative Note


Pre-op Diagnosis:


right ankle fracture


Procedure:


see op report


Post-op Diagnosis:  same as pre-op plus


Operative Findings:  consistent w/pre-op dx studies


Anesthesia:  regional


Specimen:  none


Complications:  none


Condition:  stable


Estimated Blood Loss:  none


Implant(s) used?:  Yes











Donn Fletcher MD        Jan 22, 2021 12:22

## 2021-01-22 NOTE — NUR
CASE MANAGEMENT:REVIEW



60YR OLD FEMALE WALKED INTO ER



CC: S/P FALL



SI: TIBIA/FIBIA/ANKLE FRACTURE

98.8   72  20  117/58  96% ON RA

BUN+26   GLUCOSE+209



IS: IV DILAUDID 

1L NS BOLUS

IV ZOFRAN

XRAY ANKLE &KNEE

**: TO MED/SURG 3 EAST



PLAN:

SURGERY FOR RT ANKLE ORIF

## 2021-01-22 NOTE — NUR
NURSE NOTES:

Report received from Donald RN, rounds made. Patient AOx4, calm. Respirations even/unlabored 
on RA. IV D5NS at 100 ml/hr to LAC, site asymptomatic. Remains NPO. Right lower leg, with 
splint/ACE wrap. Neuro checks done, skin warm, wiggles, pedal pulses palpable, no NT, no 
pedal swelling noted. Denies need for pain medication at this time. Will review surgical and 
blood consent with patient. Plans for OR 12p today, will update patient. Call light in 
reach, bed in lowest position, will continue to monitor.

## 2021-01-22 NOTE — IMMEDIATE POST-OP EVALUATION
Immediate Post-Op Evalulation


Immediate Post-Op Evalulation


Procedure:  ORIF R Ankle


Date of Evaluation:  2021


Time of Evaluation:  15:05


IV Fluids:  800 LR


Blood Products:  0


Estimated Blood Loss:  200


Urinary Output:  0


Blood Pressure Systolic:  140


Blood Pressure Diastolic:  64


Pulse Rate:  69


Respiratory Rate:  16


O2 Sat by Pulse Oximetry:  100


Temperature (Fahrenheit):  98.4


Pain Score (1-10):  3


Nausea:  No


Vomiting:  No


Complications


0


Patient Status:  awake, reacts, patent, extubated, none


Hydration Status:  adequate


Dru Grams Ancef IV


Given Within 1 Hr of Incision:  Yes


Time Given:  13:01











León Parr MD                2021 13:46

## 2021-01-22 NOTE — DIAGNOSTIC IMAGING REPORT
XRAY Ankle 2v R, Fluoro > 1h

 

CLINICAL HISTORY: Right ankle pain, fracture.   Intraoperative fluoroscopic imaging

 

COMPARISON:  Right ankle radiographs 1/21/2021

 

FINDINGS:  

 

Fluoroscopy independent procedure performed for orthopedic surgery. 29.9 seconds of

fluoroscopy time utilized by the ordering physician.  Total cumulative dose is 0.73

mGy and 0.15555 mGy.m2.  Total of 4 spot images are obtained demonstrate operative

fixation of previously described ankle fractures.

 

 

IMPRESSION:

 

FLUOROSCOPIC IMAGING FROM ORTHOPEDIC SURGERY. PLEASE SEE OPERATIVE REPORT.

## 2021-01-22 NOTE — NUR
NURSE HAND-OFF: 



Important Events on Shift: Admission, pain management

Patient Status: Stable

Diet: NPO



Pending Orders: []

Pending Results/Labs:[]

Pending MD notification:[]



Latest Vital Signs: Temperature 98.2 , Pulse 72 , B/P 94 /56 , Respiratory Rate 18 , O2 SAT 
96 , Room Air, O2 Flow Rate .  

Vital Sign Comment: VS stable



Latest Abrams Fall Score: 70  

Fall Risk: High Risk 

Safety Measures: Call light Within Reach, Bed Alarm , Side Rails Side Rails x2, Bed position 
Low and Locked.

Fall Precautions: 

Door Sign

Patient Fall Education



Report given to Kaylee VÁSQUEZ.

## 2021-01-22 NOTE — DIAGNOSTIC IMAGING REPORT
XRAY Ankle 2v R, Fluoro > 1h

 

CLINICAL HISTORY: Right ankle pain, fracture.   Intraoperative fluoroscopic imaging

 

COMPARISON:  Right ankle radiographs 1/21/2021

 

FINDINGS:  

 

Fluoroscopy independent procedure performed for orthopedic surgery. 29.9 seconds of

fluoroscopy time utilized by the ordering physician.  Total cumulative dose is 0.73

mGy and 0.47543 mGy.m2.  Total of 4 spot images are obtained demonstrate operative

fixation of previously described ankle fractures.

 

 

IMPRESSION:

 

FLUOROSCOPIC IMAGING FROM ORTHOPEDIC SURGERY. PLEASE SEE OPERATIVE REPORT.

## 2021-01-22 NOTE — NUR
NURSES NOTES:



Received report from THALIA Page. Rounds completed. Pt in bed, A/OX4, denies pain at this time. 
No outward s/s of distress noted. Breathing pattern is even and unlabored off of 2 L NC now 
to RA. Tolerating well. IV R wrist in place, infusing IVF without incident. R ankle dressing 
clean, dry intact. Leg elevated. All due medications will be administered. Bed at lowest 
level. Bed at lowest level. Pt will continue to be monitored.

## 2021-01-22 NOTE — NUR
NURSE NOTES:

Reviewed IVF order with Dr. Fletcher, ordered updated to D5NS at 150 ml/hr, will follow as 
ordered.

## 2021-01-22 NOTE — NUR
ED Nurse Note:



Pt resting quietly in bed on phone, conversing and laughing, pt states pain 
meds not effective and asking for more meds since IV line is now in, denies 
nausea, pt on cardiac monitoring, v/s stable, no sob or labored breathing, pt 
being prepared for admission, pt refused urine at this time MD aware, denies 
chest pain, toes on right foor with cap refill less than 3 seconds, pt denies 
it being too tight, will continuie to closely monitor and preapre for hospital 
admission.

## 2021-01-22 NOTE — ANETHESIA PREOPERATIVE EVAL
Anesthesia Pre-op PMH/ROS


General


Date of Evaluation:  2021


Time of Evaluation:  12:47


Anesthesiologist:  Karolyn


ASA Score:  ASA 3


Mallampati Score


Class I : Soft palate, uvula, fauces, pillars visible


Class II: Soft palate, uvula, fauces visible


Class III: Soft palate, base of uvula visible


Class IV: Only hard plate visible


Mallampati Classification:  Class III


Surgeon:  Chance


Diagnosis:  R Ankel Pain


Surgical Procedure:  ORIF R Ankle


Anesthesia History:  none


Family History:  no anesthesia problems


Allergies:  


Coded Allergies:  


     LAMOTRIGINE (Verified  Allergy, Mild, Hives, 10/5/18)


     PENICILLINS (Verified  Allergy, Mild, Hives, 10/5/18)


     ERYTHROMYCIN BASE (Unverified  Allergy, Unknown, Rash, 10/5/18)


     PREGABALIN (Verified  Allergy, Unknown, 10/5/18)


   SWELLING


     SERTRALINE (Verified  Allergy, Unknown, 10/5/18)


     TRAMADOL HCL (Verified  Allergy, Unknown, 2/25/15)


   VOMIT


Medications:  see eMAR


Patient NPO?:  Yes





Past Medical History


Cardiovascular:  Reports: HTN, CAD, other - Syncope


Gastrointestinal/Genitourinary:  Reports: GERD, other - Colitis


Neurologic/Psychiatric:  Reports: CVA, depression/anxiety


HEENT:  Reports: cataract (L), cataract (R)


Hematology/Immune:  Reports: anemia


Other:  obesity - BMI 52


PSxH Narrative:


HOANG, Gastric Bypass, R Knee Arthroscopy





Anesthesia Pre-op Phys. Exam


Physician Exam





Last Vital Signs








  Date Time  Temp Pulse Resp B/P (MAP) Pulse Ox O2 Delivery O2 Flow Rate FiO2


 


21 11:25 98.9 65 21 101/56 (71) 98   


 


21 09:00      Room Air  








Constitutional:  NAD


Neurologic:  CN 2-12 intact


Cardiovascular:  RRR


Respiratory:  CTA


Gastrointestinal:  S/NT/ND





Airway Exam


Mallampati Score:  Class III


MO:  limited


ROM:  limited


Teeth:  missing, intact





Anesthesia Pre-op A/P


Labs





Hematology








Test


 21


23:15


 


White Blood Count


 7.6 K/UL


(4.8-10.8)


 


Red Blood Count


 4.26 M/UL


(4.20-5.40)


 


Hemoglobin


 11.6 G/DL


(12.0-16.0)  L


 


Hematocrit


 38.3 %


(37.0-47.0)


 


Mean Corpuscular Volume 90 FL (80-99)  


 


Mean Corpuscular Hemoglobin


 27.3 PG


(27.0-31.0)


 


Mean Corpuscular Hemoglobin


Concent 30.3 G/DL


(32.0-36.0)  L


 


Red Cell Distribution Width


 17.3 %


(11.6-14.8)  H


 


Platelet Count


 365 K/UL


(150-450)


 


Mean Platelet Volume


 6.8 FL


(6.5-10.1)


 


Neutrophils (%) (Auto)


 64.7 %


(45.0-75.0)


 


Lymphocytes (%) (Auto)


 25.0 %


(20.0-45.0)


 


Monocytes (%) (Auto)


 7.6 %


(1.0-10.0)


 


Eosinophils (%) (Auto)


 1.9 %


(0.0-3.0)


 


Basophils (%) (Auto)


 0.9 %


(0.0-2.0)








Coagulation








Test


 21


23:15


 


Prothrombin Time


 10.4 SEC


(9.30-11.50)


 


Prothromb Time International


Ratio 0.9 (0.9-1.1)  





 


Activated Partial


Thromboplast Time 28 SEC (23-33)











Chemistry








Test


 21


23:15


 


Sodium Level


 136 MMOL/L


(136-145)


 


Potassium Level


 4.1 MMOL/L


(3.5-5.1)


 


Chloride Level


 101 MMOL/L


()


 


Carbon Dioxide Level


 28 MMOL/L


(21-32)


 


Anion Gap


 7 mmol/L


(5-15)


 


Blood Urea Nitrogen


 26 mg/dL


(7-18)  H


 


Creatinine


 1.3 MG/DL


(0.55-1.30)


 


Estimat Glomerular Filtration


Rate 51.0 mL/min


(>60)


 


Glucose Level


 209 MG/DL


()  H


 


Calcium Level


 9.0 MG/DL


(8.5-10.1)











Risk Assessment & Plan


Assessment:


ASA 3


Plan:


GA


Status Change Before Surgery:  No





Pre-Antibiotics


Dru Grams Ancef IV


Given Within 1 Hr of Incision:  Yes


Time Given:  13:01











León Parr MD                2021 13:45

## 2021-01-22 NOTE — NUR
NURSE NOTES:

Patient sent down to OR via bed, on RA, in stable condition. Notified OR that H&P from Dr. Vogt has not been dictated and is not in chart. ER report printed and added to chart prior 
to sending patient down to OR.

## 2021-01-23 VITALS — DIASTOLIC BLOOD PRESSURE: 61 MMHG | SYSTOLIC BLOOD PRESSURE: 125 MMHG

## 2021-01-23 VITALS — SYSTOLIC BLOOD PRESSURE: 177 MMHG | DIASTOLIC BLOOD PRESSURE: 53 MMHG

## 2021-01-23 VITALS — DIASTOLIC BLOOD PRESSURE: 50 MMHG | SYSTOLIC BLOOD PRESSURE: 116 MMHG

## 2021-01-23 VITALS — SYSTOLIC BLOOD PRESSURE: 125 MMHG | DIASTOLIC BLOOD PRESSURE: 48 MMHG

## 2021-01-23 VITALS — SYSTOLIC BLOOD PRESSURE: 120 MMHG | DIASTOLIC BLOOD PRESSURE: 71 MMHG

## 2021-01-23 VITALS — SYSTOLIC BLOOD PRESSURE: 98 MMHG | DIASTOLIC BLOOD PRESSURE: 54 MMHG

## 2021-01-23 LAB
ADD MANUAL DIFF: NO
ALBUMIN SERPL-MCNC: 2.6 G/DL (ref 3.4–5)
ALBUMIN/GLOB SERPL: 0.7 {RATIO} (ref 1–2.7)
ALP SERPL-CCNC: 99 U/L (ref 46–116)
ALT SERPL-CCNC: 17 U/L (ref 12–78)
ANION GAP SERPL CALC-SCNC: 4 MMOL/L (ref 5–15)
AST SERPL-CCNC: 18 U/L (ref 15–37)
BASOPHILS NFR BLD AUTO: 0.9 % (ref 0–2)
BILIRUB SERPL-MCNC: < 0.1 MG/DL (ref 0.2–1)
BUN SERPL-MCNC: 15 MG/DL (ref 7–18)
CALCIUM SERPL-MCNC: 8.7 MG/DL (ref 8.5–10.1)
CHLORIDE SERPL-SCNC: 106 MMOL/L (ref 98–107)
CO2 SERPL-SCNC: 29 MMOL/L (ref 21–32)
CREAT SERPL-MCNC: 1 MG/DL (ref 0.55–1.3)
EOSINOPHIL NFR BLD AUTO: 0.3 % (ref 0–3)
ERYTHROCYTE [DISTWIDTH] IN BLOOD BY AUTOMATED COUNT: 17.4 % (ref 11.6–14.8)
GLOBULIN SER-MCNC: 3.6 G/DL
HCT VFR BLD CALC: 28.8 % (ref 37–47)
HGB BLD-MCNC: 8.8 G/DL (ref 12–16)
LYMPHOCYTES NFR BLD AUTO: 19.1 % (ref 20–45)
MCV RBC AUTO: 90 FL (ref 80–99)
MONOCYTES NFR BLD AUTO: 7.1 % (ref 1–10)
NEUTROPHILS NFR BLD AUTO: 72.5 % (ref 45–75)
PHOSPHATE SERPL-MCNC: 4.4 MG/DL (ref 2.5–4.9)
PLATELET # BLD: 270 K/UL (ref 150–450)
POTASSIUM SERPL-SCNC: 4.2 MMOL/L (ref 3.5–5.1)
RBC # BLD AUTO: 3.19 M/UL (ref 4.2–5.4)
SODIUM SERPL-SCNC: 139 MMOL/L (ref 136–145)
WBC # BLD AUTO: 6.5 K/UL (ref 4.8–10.8)

## 2021-01-23 RX ADMIN — HEPARIN SODIUM SCH UNITS: 5000 INJECTION INTRAVENOUS; SUBCUTANEOUS at 14:10

## 2021-01-23 RX ADMIN — MORPHINE SULFATE PRN MG: 4 INJECTION INTRAVENOUS at 20:38

## 2021-01-23 RX ADMIN — SODIUM CHLORIDE SCH MLS/HR: 0.9 INJECTION INTRAVENOUS at 06:10

## 2021-01-23 RX ADMIN — MORPHINE SULFATE PRN MG: 4 INJECTION INTRAVENOUS at 10:16

## 2021-01-23 RX ADMIN — SODIUM CHLORIDE SCH MLS/HR: 0.9 INJECTION INTRAVENOUS at 13:28

## 2021-01-23 RX ADMIN — MORPHINE SULFATE PRN MG: 4 INJECTION INTRAVENOUS at 14:09

## 2021-01-23 RX ADMIN — MORPHINE SULFATE PRN MG: 4 INJECTION INTRAVENOUS at 17:26

## 2021-01-23 RX ADMIN — DOCUSATE SODIUM SCH MG: 100 CAPSULE, LIQUID FILLED ORAL at 17:25

## 2021-01-23 RX ADMIN — MORPHINE SULFATE PRN MG: 4 INJECTION INTRAVENOUS at 06:10

## 2021-01-23 RX ADMIN — HUMAN INSULIN SCH MLS/HR: 100 INJECTION, SOLUTION SUBCUTANEOUS at 10:23

## 2021-01-23 RX ADMIN — HUMAN INSULIN SCH MLS/HR: 100 INJECTION, SOLUTION SUBCUTANEOUS at 04:31

## 2021-01-23 RX ADMIN — HEPARIN SODIUM SCH UNITS: 5000 INJECTION INTRAVENOUS; SUBCUTANEOUS at 06:13

## 2021-01-23 RX ADMIN — OXYCODONE HYDROCHLORIDE SCH MG: 20 TABLET, FILM COATED, EXTENDED RELEASE ORAL at 09:07

## 2021-01-23 RX ADMIN — OXYCODONE HYDROCHLORIDE SCH MG: 20 TABLET, FILM COATED, EXTENDED RELEASE ORAL at 20:04

## 2021-01-23 RX ADMIN — HEPARIN SODIUM SCH UNITS: 5000 INJECTION INTRAVENOUS; SUBCUTANEOUS at 20:38

## 2021-01-23 RX ADMIN — HUMAN INSULIN SCH MLS/HR: 100 INJECTION, SOLUTION SUBCUTANEOUS at 17:44

## 2021-01-23 RX ADMIN — DOCUSATE SODIUM SCH MG: 100 CAPSULE, LIQUID FILLED ORAL at 09:06

## 2021-01-23 RX ADMIN — OXYCODONE HYDROCHLORIDE PRN MG: 5 TABLET ORAL at 12:24

## 2021-01-23 RX ADMIN — DOCUSATE SODIUM SCH MG: 100 CAPSULE, LIQUID FILLED ORAL at 12:23

## 2021-01-23 NOTE — NUR
NURSE NOTES:

RECIEVED PT. AWAKE AND ALERT NO S/S OF DISTRESS NOTED C/O PAIN TO RIGHT ANKLE 10/10 S/P ORIF 
MEDICATED AS ORDER ,CALL LIGHT WITHIN REACH @ ALL TIMES.AL CALLS ATTENDED.WILL CONTINUE TO 
MONITOR.PLAN OF CARE DISCUSSED .

## 2021-01-23 NOTE — OPERATIVE NOTE - DICTATED
DATE OF OPERATION:  01/21/2021

PREOPERATIVE DIAGNOSES:

1. Right ankle fracture dislocation with fracture of lateral malleolus

with ligamentous deltoid tear, masonnueva  equivalent.

2. Right syndesmosis tear.

3. BMI over 40.



POSTOPERATIVE DIAGNOSES:

1. Right ankle fracture dislocation with fracture of lateral malleolus

with ligamentous deltoid tear, maisonueve equivalent.

2. Right syndesmosis tear.

3. BMI over 40.



PROCEDURES:

1. Open reduction and internal fixation of right ankle fracture

dislocation.

2. Open repair of right syndesmosis ligament.



SURGEON:  Donn Fletcher MD



ANESTHESIA:  General.



INDICATION FOR PROCEDURE:  The patient is a pleasant female who sustained a

mechanical fall, diagnosed with ankle fracture dislocation indicative of

operative fixation.  Risks, limitations, expectations, and complications

related to the procedure were discussed in detail including continued

pain, need for future surgery, risk of anesthesia, medical complications,

DVT, PE, mortality risk.  All questions were addressed.



DESCRIPTION OF PROCEDURE:  After informed consent was obtained, the patient

was brought to the operating room.  The patient was placed under general

anesthesia.  Right leg was prepped and draped in a sterile manner.

Time-out was performed.  Standard lateral skin incision was then marked

out and skin was incised.  Blunt dissection down to the lateral fibula was

performed.  There was a distal fibula fracture just proximal to the fibular

tip extending proximally.  A lag screw was then placed with the reduction

of the mortise and the fracture fragment.  A 7-hole lateral malleolus

plate was then placed and secured with two screws.  Once adequate position

was confirmed, 3 additional distal locking screws and 2 additional

proximal locking screws were placed.  Once this was done, two Biomet

ToggleLoc syndesmosis repair sutures, anchors were then deployed securing

the syndesmosis.  Once this was done, external rotation drawer test was

negative.  No widening of clear space.  Mortise was well reduced.  Implant

was relatively good position slightly anterior on the fibula.

Unfortunately, there was an additional space posteriorly.

Therefore, it was acceptable given that there was adequate screw fixation

with the proximal screws.  Once that was done, the lag screw was noted to

be somewhat long, therefore it was removed.  Wound was copiously

irrigated.  Skin was approximated using 3-0 Vicryl, 3-0 Monocryl, and

Dermabond dressing.  Compression dressing was applied.  The patient was

awoken and taken to the recovery room with stable vital signs.



ESTIMATED BLOOD LOSS:  50 mL.



COMPLICATIONS:  None.



SPECIMENS:  None.



IMPLANTS:  Include Paul lateral fibular plate, two Biomet ToggleLoc

Suture Relay system for syndesmosis repair.









  ______________________________________________

  Donn Fletcher M.D.





DR:  EMILY

D:  01/22/2021 14:48

T:  01/23/2021 01:36

JOB#:  [id number]

CC:



QAMAR

## 2021-01-23 NOTE — INTERNAL MED PROGRESS NOTE
Subjective


Date of Service:  Jan 23, 2021


Physician Name


Ludwin Fulton


Attending Physician


Medhat Vogt MD





Current Medications








 Medications


  (Trade)  Dose


 Ordered  Sig/Tapan


 Route


 PRN Reason  Start Time


 Stop Time Status Last Admin


Dose Admin


 


 Acetaminophen


  (Tylenol)  650 mg  Q6H  PRN


 ORAL


 Mild Pain (Pain Scale 1-3)  1/22/21 07:15


 2/21/21 07:14   





 


 Acetaminophen


  (Tylenol)  650 mg  Q6H  PRN


 ORAL


 Temp >100.5  1/22/21 07:15


 2/21/21 07:14   





 


 Cetirizine HCl


  (ZyrTEC)  10 mg  QHS


 ORAL


   1/22/21 04:00


 4/22/21 03:59  1/22/21 21:45





 


 Dextrose/Sodium


 Chloride  1,000 ml @ 


 150 mls/hr  Q6H40M


 IV


   1/22/21 04:00


 2/21/21 03:59  1/23/21 10:23





 


 Docusate Sodium


  (Colace)  100 mg  THREE TIMES A  DAY


 ORAL


   1/22/21 18:00


 2/21/21 17:59  1/23/21 12:23





 


 Gabapentin


  (Neurontin)  600 mg  BID@0800,1300


 ORAL


   1/22/21 09:15


 2/21/21 08:59  1/23/21 13:28





 


 Gabapentin


  (Neurontin)  1,200 mg  QHS


 ORAL


   1/22/21 21:00


 2/21/21 03:59  1/22/21 21:46





 


 Heparin Sodium


  (Porcine)


  (Heparin 5000


 units/ml)  5,000 units  EVERY 8  HOURS


 SUBQ


   1/22/21 06:00


 3/8/21 05:59  1/23/21 14:10





 


 Hydromorphone HCl


  (Dilaudid)  0.5 mg  Q4H  PRN


 SUBQ


 Mild Pain (Pain Scale 1-3)  1/22/21 12:30


 1/29/21 12:29  1/22/21 17:22





 


 Morphine Sulfate


  (Morphine


 Sulfate)  2 mg  Q4H  PRN


 IVP


 For moderate pain (4-6)  1/22/21 18:00


 1/29/21 17:59  1/22/21 23:19





 


 Morphine Sulfate


  (Morphine


 Sulfate)  4 mg  Q4H  PRN


 IVP


 For severe pain (7-10)  1/22/21 18:00


 1/29/21 17:59  1/23/21 14:09





 


 Ondansetron HCl


  (Zofran)  4 mg  Q6H  PRN


 IVP


 Nausea & Vomiting  1/22/21 12:30


 2/21/21 12:29   





 


 Oxycodone HCl


  (OxyCONTIN)  20 mg  EVERY 12  HOURS


 ORAL


   1/22/21 21:00


 1/29/21 20:59  1/23/21 09:07





 


 Oxycodone HCl


  (Roxicodone)  5 mg  Q6H  PRN


 ORAL


 Breakthrough Pain  1/23/21 09:00


 1/30/21 08:59  1/23/21 12:24





 


 Temazepam


  (RestoriL)  7.5 mg  HSPRN  PRN


 ORAL


 Insomnia  1/22/21 12:30


 1/29/21 12:29   





 


 Zolpidem Tartrate


  (Ambien)  5 mg  HSPRN  PRN


 ORAL


 Insomnia  1/22/21 03:30


 1/29/21 03:29   











Allergies:  


Coded Allergies:  


     LAMOTRIGINE (Verified  Allergy, Mild, Hives, 10/5/18)


     PENICILLINS (Verified  Allergy, Mild, Hives, 10/5/18)


     ERYTHROMYCIN BASE (Unverified  Allergy, Unknown, Rash, 10/5/18)


     PREGABALIN (Verified  Allergy, Unknown, 10/5/18)


   SWELLING


     SERTRALINE (Verified  Allergy, Unknown, 10/5/18)


     TRAMADOL HCL (Verified  Allergy, Unknown, 2/25/15)


   VOMIT


ROS Limited/Unobtainable:  No


Constitutional:  Reports: no symptoms


HEENT:  Reports: no symptoms


Cardiovascular:  Reports: no symptoms


Respiratory:  Reports: no symptoms


Gastrointestinal/Abdominal:  Reports: no symptoms


Genitourinary:  Reports: no symptoms


Neurologic/Psychiatric:  Reports: no symptoms


Subjective


59 YO F admitted with right ankle pain.  Now fracture posterior and lateral 

right malleolus.  S/P ORIF 1/21/21.  Cover for Int Josh Vogt





Objective





Last Vital Signs








  Date Time  Temp Pulse Resp B/P (MAP) Pulse Ox O2 Delivery O2 Flow Rate FiO2


 


1/23/21 12:00 98.7 65 20 125/48 (73) 98   


 


1/22/21 21:00      Room Air  


 


1/22/21 16:20       3 








General Appearance:  WD/WN, no apparent distress, alert, obese


EENT:  PERRL/EOMI, normal ENT inspection


Neck:  non-tender, normal alignment, supple, normal inspection


Cardiovascular:  normal peripheral pulses, normal rate, regular rhythm, no 

gallop/murmur, no JVD


Respiratory/Chest:  chest wall non-tender, lungs clear, normal breath sounds, no

respiratory distress, no accessory muscle use


Abdomen:  normal bowel sounds, non tender, soft, no organomegaly, no mass


Extremities:  normal range of motion, non-tender


Neurologic:  CNs II-XII grossly normal, no motor/sensory deficits


Skin:  normal pigmentation





Laboratory Tests








Test


 1/23/21


04:55


 


White Blood Count


 6.5 K/UL


(4.8-10.8)


 


Red Blood Count


 3.19 M/UL


(4.20-5.40)  L


 


Hemoglobin


 8.8 G/DL


(12.0-16.0)  L


 


Hematocrit


 28.8 %


(37.0-47.0)  L


 


Mean Corpuscular Volume 90 FL (80-99)  


 


Mean Corpuscular Hemoglobin


 27.6 PG


(27.0-31.0)


 


Mean Corpuscular Hemoglobin


Concent 30.6 G/DL


(32.0-36.0)  L


 


Red Cell Distribution Width


 17.4 %


(11.6-14.8)  H


 


Platelet Count


 270 K/UL


(150-450)


 


Mean Platelet Volume


 7.2 FL


(6.5-10.1)


 


Neutrophils (%) (Auto)


 72.5 %


(45.0-75.0)


 


Lymphocytes (%) (Auto)


 19.1 %


(20.0-45.0)  L


 


Monocytes (%) (Auto)


 7.1 %


(1.0-10.0)


 


Eosinophils (%) (Auto)


 0.3 %


(0.0-3.0)


 


Basophils (%) (Auto)


 0.9 %


(0.0-2.0)


 


Sodium Level


 139 MMOL/L


(136-145)


 


Potassium Level


 4.2 MMOL/L


(3.5-5.1)


 


Chloride Level


 106 MMOL/L


()


 


Carbon Dioxide Level


 29 MMOL/L


(21-32)


 


Anion Gap


 4 mmol/L


(5-15)  L


 


Blood Urea Nitrogen


 15 mg/dL


(7-18)


 


Creatinine


 1.0 MG/DL


(0.55-1.30)


 


Estimat Glomerular Filtration


Rate > 60 mL/min


(>60)


 


Glucose Level


 139 MG/DL


()  H


 


Calcium Level


 8.7 MG/DL


(8.5-10.1)


 


Phosphorus Level


 4.4 MG/DL


(2.5-4.9)


 


Magnesium Level


 2.0 MG/DL


(1.8-2.4)


 


Total Bilirubin


 < 0.1 MG/DL


(0.2-1.0)  L


 


Aspartate Amino Transf


(AST/SGOT) 18 U/L (15-37)





 


Alanine Aminotransferase


(ALT/SGPT) 17 U/L (12-78)





 


Alkaline Phosphatase


 99 U/L


()


 


Total Protein


 6.2 G/DL


(6.4-8.2)  L


 


Albumin


 2.6 G/DL


(3.4-5.0)  L


 


Globulin 3.6 g/dL  


 


Albumin/Globulin Ratio


 0.7 (1.0-2.7)


L











Microbiology








 Date/Time


Source Procedure


Growth Status





 


 1/21/21 20:47


Nasopharynx SARS-CoV-2 RdRp Gene Assay - Final Complete

















Intake and Output  


 


 1/22/21 1/23/21





 19:00 07:00


 


Intake Total 1730 ml 480 ml


 


Output Total 1475 ml 


 


Balance 255 ml 480 ml


 


  


 


Intake Oral 480 ml 480 ml


 


IV Total 1250 ml 


 


Output Urine Total 1400 ml 


 


Estimated Blood Loss 75 ml 


 


# Voids 1 2











Assessment/Plan


Problem List:  


(1) Hypertension


Assessment & Plan:  Continue valsartan





(2) Bipolar depression


(3) Maisonneuve fracture of right lower extremity


Assessment & Plan:  S/P ORIF right posterior and lateral malleolus fracture on 

1/21/21.  Ortho=Dr Donn Fletcher





(4) Degenerative disc disease, lumbar


(5) Bipolar 1 disorder, depressed


Assessment & Plan:  Continue cymbalta and trazodone





(6) Acute coronary syndrome


(7) Methamphetamine abuse


(8) Chest pain of uncertain etiology











Ludwin Fulton MD               Jan 23, 2021 15:51

## 2021-01-23 NOTE — NUR
NURSE HAND-OFF: 



Important Events on Shift:[n/a]

Patient Status: [stable]

Diet: [regular]



Pending Orders: [n/a]

Pending Results/Labs:[n/a]

Pending MD notification:[n/a]



Latest Vital Signs: Temperature 98.1 , Pulse 75 , B/P 177 /53 , Respiratory Rate 20 , O2 SAT 
98 , Nasal Cannula, O2 Flow Rate 3 .  

Vital Sign Comment: [wnl]



Latest Abrams Fall Score: 80  

Fall Risk: High Risk 

Safety Measures: Call light Within Reach, Bed Alarm , Side Rails Side Rails x2, Bed position 
Low and Locked.

Fall Precautions: 

Yellow Socks

Yellow Gown

Door Sign

Patient Fall Education



Report given to [THALIA Ng].

## 2021-01-23 NOTE — SURGERY PROGRESS NOTE
Surgery Progress Note


Subjective


Additional Comments


s/p orif


pain but stable


planning to work with pt today





Objective





Last 24 Hour Vital Signs








  Date Time  Temp Pulse Resp B/P (MAP) Pulse Ox O2 Delivery O2 Flow Rate FiO2


 


1/23/21 12:00 98.7 65 20 125/48 (73) 98   


 


1/23/21 10:32  68 22  98   


 


1/23/21 09:00      Room Air  


 


1/23/21 08:00 98.1 75 20 177/53 (94) 98   


 


1/23/21 04:00 97.9 60 17 125/61 (82) 98   


 


1/23/21 00:00 98.2 73 18 98/54 (69) 97   


 


1/22/21 21:00      Room Air  


 


1/22/21 19:00 98.1 69 20 118/70 (86) 99   


 


1/22/21 18:00 97.1 72 21 116/77 (90) 99   


 


1/22/21 17:00 98.3 63 20 121/63 (82) 99   








I&O











Intake and Output  


 


 1/22/21 1/23/21





 19:00 07:00


 


Intake Total 1730 ml 480 ml


 


Output Total 1475 ml 


 


Balance 255 ml 480 ml


 


  


 


Intake Oral 480 ml 480 ml


 


IV Total 1250 ml 


 


Output Urine Total 1400 ml 


 


Estimated Blood Loss 75 ml 


 


# Voids 1 2








Dressing:  dry


Wound:  clean


Cardiovascular:  RSR


Respiratory:  clear


Abdomen:  soft, flat, non-tender, present bowel sounds


Extremities:  edema, no tenderness, no cyanosis, other





Laboratory Tests








Test


 1/23/21


04:55


 


White Blood Count


 6.5 K/UL


(4.8-10.8)


 


Red Blood Count


 3.19 M/UL


(4.20-5.40)  L


 


Hemoglobin


 8.8 G/DL


(12.0-16.0)  L


 


Hematocrit


 28.8 %


(37.0-47.0)  L


 


Mean Corpuscular Volume 90 FL (80-99)  


 


Mean Corpuscular Hemoglobin


 27.6 PG


(27.0-31.0)


 


Mean Corpuscular Hemoglobin


Concent 30.6 G/DL


(32.0-36.0)  L


 


Red Cell Distribution Width


 17.4 %


(11.6-14.8)  H


 


Platelet Count


 270 K/UL


(150-450)


 


Mean Platelet Volume


 7.2 FL


(6.5-10.1)


 


Neutrophils (%) (Auto)


 72.5 %


(45.0-75.0)


 


Lymphocytes (%) (Auto)


 19.1 %


(20.0-45.0)  L


 


Monocytes (%) (Auto)


 7.1 %


(1.0-10.0)


 


Eosinophils (%) (Auto)


 0.3 %


(0.0-3.0)


 


Basophils (%) (Auto)


 0.9 %


(0.0-2.0)


 


Sodium Level


 139 MMOL/L


(136-145)


 


Potassium Level


 4.2 MMOL/L


(3.5-5.1)


 


Chloride Level


 106 MMOL/L


()


 


Carbon Dioxide Level


 29 MMOL/L


(21-32)


 


Anion Gap


 4 mmol/L


(5-15)  L


 


Blood Urea Nitrogen


 15 mg/dL


(7-18)


 


Creatinine


 1.0 MG/DL


(0.55-1.30)


 


Estimat Glomerular Filtration


Rate > 60 mL/min


(>60)


 


Glucose Level


 139 MG/DL


()  H


 


Calcium Level


 8.7 MG/DL


(8.5-10.1)


 


Phosphorus Level


 4.4 MG/DL


(2.5-4.9)


 


Magnesium Level


 2.0 MG/DL


(1.8-2.4)


 


Total Bilirubin


 < 0.1 MG/DL


(0.2-1.0)  L


 


Aspartate Amino Transf


(AST/SGOT) 18 U/L (15-37)





 


Alanine Aminotransferase


(ALT/SGPT) 17 U/L (12-78)





 


Alkaline Phosphatase


 99 U/L


()


 


Total Protein


 6.2 G/DL


(6.4-8.2)  L


 


Albumin


 2.6 G/DL


(3.4-5.0)  L


 


Globulin 3.6 g/dL  


 


Albumin/Globulin Ratio


 0.7 (1.0-2.7)


L











Plan


Problems:  


(1) Hypertension


(2) Bipolar depression


(3) DJD (degenerative joint disease) of knee


(4) ACS (acute coronary syndrome)


(5) Left knee pain


(6) Left ankle pain


(7) Maisonneuve fracture of right lower extremity


Assessment & Plan:  Post reduction images obtained with ankle in splint. On the 

lateral view, cortical


step-off noted involving the posterior malleolus as well as the distal fibu

la/lateral


malleolus noted. These fractures are not well seen on the frontal projection. 

Ankle


mortise grossly anatomic as well as subtalar joint. There is soft tissue 

swelling.


 


IMPRESSION:


 


FRACTURES OF THE POSTERIOR AND LATERAL MALLEOLI ONLY APPRECIATED ON THE LATERAL 

VIEW.





ortho eval


to OR for ORIF 


no further trauma isolated ortho


no head injury


no loc 





doing well post op 


no n/v


pain okay


pt/ot 





(8) Fibromyalgia


(9) HTN (hypertension)


(10) Degenerative disc disease, lumbar


(11) Bipolar 1 disorder, depressed


(12) Acute coronary syndrome


(13) Methamphetamine abuse


(14) Right knee pain


(15) Osteoarthritis of left knee


(16) Obesity (BMI 35.0-39.9 without comorbidity)


(17) Intractable neuropathic pain of left knee


(18) Chest pain of uncertain etiology











Yon Kearns                Jan 23, 2021 16:57

## 2021-01-23 NOTE — 48 HOUR POST ANESTHESIA EVAL
Post Anesthesia Evaluation


Procedure:  ORIF R Ankle


Date of Evaluation:  Jan 23, 2021


Time of Evaluation:  10:30


Blood Pressure Systolic:  146


0:  72


Pulse Rate:  68


Respiratory Rate:  22


Temperature (Fahrenheit):  97.6


O2 Sat by Pulse Oximetry:  98


Airway:  patent


Nausea:  No


Vomiting:  No


Pain Intensity:  3


Hydration Status:  adequate - I/V fluids down to 100 for hour, oral intake 

sufficient


Cardiopulmonary Status:


stable


Mental Status/LOC:  patient returned to baseline


Follow-up Care/Observations:


n/a


Post-Anesthesia Complications:


none


Follow-up care needed:  N/A











Hood Cash MD            Jan 23, 2021 10:32

## 2021-01-24 VITALS — SYSTOLIC BLOOD PRESSURE: 100 MMHG | DIASTOLIC BLOOD PRESSURE: 68 MMHG

## 2021-01-24 VITALS — SYSTOLIC BLOOD PRESSURE: 150 MMHG | DIASTOLIC BLOOD PRESSURE: 61 MMHG

## 2021-01-24 VITALS — DIASTOLIC BLOOD PRESSURE: 81 MMHG | SYSTOLIC BLOOD PRESSURE: 127 MMHG

## 2021-01-24 VITALS — DIASTOLIC BLOOD PRESSURE: 62 MMHG | SYSTOLIC BLOOD PRESSURE: 126 MMHG

## 2021-01-24 VITALS — SYSTOLIC BLOOD PRESSURE: 106 MMHG | DIASTOLIC BLOOD PRESSURE: 47 MMHG

## 2021-01-24 VITALS — SYSTOLIC BLOOD PRESSURE: 120 MMHG | DIASTOLIC BLOOD PRESSURE: 49 MMHG

## 2021-01-24 LAB
ADD MANUAL DIFF: NO
ANION GAP SERPL CALC-SCNC: 7 MMOL/L (ref 5–15)
BASOPHILS NFR BLD AUTO: 0.8 % (ref 0–2)
BUN SERPL-MCNC: 15 MG/DL (ref 7–18)
CALCIUM SERPL-MCNC: 8.7 MG/DL (ref 8.5–10.1)
CHLORIDE SERPL-SCNC: 103 MMOL/L (ref 98–107)
CO2 SERPL-SCNC: 27 MMOL/L (ref 21–32)
CREAT SERPL-MCNC: 1 MG/DL (ref 0.55–1.3)
EOSINOPHIL NFR BLD AUTO: 1.4 % (ref 0–3)
ERYTHROCYTE [DISTWIDTH] IN BLOOD BY AUTOMATED COUNT: 17.8 % (ref 11.6–14.8)
HCT VFR BLD CALC: 28.5 % (ref 37–47)
HGB BLD-MCNC: 8.8 G/DL (ref 12–16)
LYMPHOCYTES NFR BLD AUTO: 12.3 % (ref 20–45)
MCV RBC AUTO: 89 FL (ref 80–99)
MONOCYTES NFR BLD AUTO: 5.1 % (ref 1–10)
NEUTROPHILS NFR BLD AUTO: 80.5 % (ref 45–75)
PLATELET # BLD: 290 K/UL (ref 150–450)
POTASSIUM SERPL-SCNC: 4.3 MMOL/L (ref 3.5–5.1)
RBC # BLD AUTO: 3.19 M/UL (ref 4.2–5.4)
SODIUM SERPL-SCNC: 136 MMOL/L (ref 136–145)
WBC # BLD AUTO: 15 K/UL (ref 4.8–10.8)

## 2021-01-24 RX ADMIN — HEPARIN SODIUM SCH UNITS: 5000 INJECTION INTRAVENOUS; SUBCUTANEOUS at 05:37

## 2021-01-24 RX ADMIN — HEPARIN SODIUM SCH UNITS: 5000 INJECTION INTRAVENOUS; SUBCUTANEOUS at 13:37

## 2021-01-24 RX ADMIN — DOCUSATE SODIUM SCH MG: 100 CAPSULE, LIQUID FILLED ORAL at 13:07

## 2021-01-24 RX ADMIN — MORPHINE SULFATE PRN MG: 4 INJECTION INTRAVENOUS at 21:10

## 2021-01-24 RX ADMIN — OXYCODONE HYDROCHLORIDE SCH MG: 20 TABLET, FILM COATED, EXTENDED RELEASE ORAL at 09:00

## 2021-01-24 RX ADMIN — OXYCODONE HYDROCHLORIDE PRN MG: 5 TABLET ORAL at 04:18

## 2021-01-24 RX ADMIN — MORPHINE SULFATE PRN MG: 4 INJECTION INTRAVENOUS at 08:28

## 2021-01-24 RX ADMIN — OXYCODONE HYDROCHLORIDE SCH MG: 20 TABLET, FILM COATED, EXTENDED RELEASE ORAL at 20:11

## 2021-01-24 RX ADMIN — MORPHINE SULFATE PRN MG: 4 INJECTION INTRAVENOUS at 14:08

## 2021-01-24 RX ADMIN — SODIUM CHLORIDE SCH MLS/HR: 0.9 INJECTION INTRAVENOUS at 13:45

## 2021-01-24 RX ADMIN — HEPARIN SODIUM SCH UNITS: 5000 INJECTION INTRAVENOUS; SUBCUTANEOUS at 21:18

## 2021-01-24 RX ADMIN — DOCUSATE SODIUM SCH MG: 100 CAPSULE, LIQUID FILLED ORAL at 17:39

## 2021-01-24 RX ADMIN — MORPHINE SULFATE PRN MG: 4 INJECTION INTRAVENOUS at 01:24

## 2021-01-24 RX ADMIN — MORPHINE SULFATE PRN MG: 4 INJECTION INTRAVENOUS at 05:28

## 2021-01-24 RX ADMIN — HUMAN INSULIN SCH MLS/HR: 100 INJECTION, SOLUTION SUBCUTANEOUS at 13:27

## 2021-01-24 RX ADMIN — DOCUSATE SODIUM SCH MG: 100 CAPSULE, LIQUID FILLED ORAL at 09:00

## 2021-01-24 RX ADMIN — HUMAN INSULIN SCH MLS/HR: 100 INJECTION, SOLUTION SUBCUTANEOUS at 23:27

## 2021-01-24 RX ADMIN — MORPHINE SULFATE PRN MG: 4 INJECTION INTRAVENOUS at 17:39

## 2021-01-24 RX ADMIN — HUMAN INSULIN SCH MLS/HR: 100 INJECTION, SOLUTION SUBCUTANEOUS at 03:27

## 2021-01-24 RX ADMIN — MORPHINE SULFATE PRN MG: 2 INJECTION, SOLUTION INTRAMUSCULAR; INTRAVENOUS at 11:21

## 2021-01-24 NOTE — NUR
NURSE NOTES:

Received report from THALIA Briones. Pt is in bed in no distress. call light within reach, bed 
in lowest position. IVF infusing well. Pt knows to call nurse when she needs to use bedside 
commode. Will continue to monitor.

## 2021-01-24 NOTE — NUR
NURSE NOTES:

Received report from THALIA Porter.  Pt in bed, A/OX4, breathing even and unlabored in RA, No 
outward s/s of distress noted. Pt c/o right ankle pain 8/10, will administer PRN pain 
medication as ordered. IV R FA in place, infusing IVF as ordered. R ankle dressing clean, 
dry intact. Leg elevated. Bed at lowest level. Pt will continue to be monitored.

## 2021-01-24 NOTE — NUR
NURSE HAND-OFF: 



Important Events on Shift:[Pain management, bedside commode, fever relieved, IV ABX given]

Patient Status: [stable]

Diet: [reg]



Pending Orders: []

Pending Results/Labs:[urine culture]

Pending MD notification:[]



Latest Vital Signs: Temperature 98.5 , Pulse 85 , B/P 100 /68 , Respiratory Rate 18 , O2 SAT 
94 , Nasal Cannula, O2 Flow Rate 3 .  

Vital Sign Comment: [stable]



Latest Abrams Fall Score: 80  

Fall Risk: High Risk 

Safety Measures: Call light Within Reach, Bed Alarm Zone 2, Side Rails Side Rails x2, Bed 
position Low and Locked.

Fall Precautions: 

Yellow Gown

Patient Fall Education



Report given to [THALIA Serrano].

## 2021-01-24 NOTE — NUR
NURSE HAND-OFF: 



Important Events on Shift: No falls, injuries.  pain managed well with both oral 
breakthrough and iv narcotics with good results.  assisted to the bedside commode; uses FWW 
to manuever herself from bed to BSC; however, needs at least 2 staff members to assist due 
to a NWB order on her right leg.  

Patient Status: stable

Diet: see chart



Pending Orders: see chart

Pending Results/Labs:see chart

Pending MD notification: see chart



Latest Vital Signs: Temperature 101.7 , Pulse 95 , B/P 106 /47 , Respiratory Rate 18 , O2 
SAT 93 , Nasal Cannula, O2 Flow Rate 3 .  

Vital Sign Comment: noted elevated temp of 101.7 at 0600; patient had at least 3 heavy 
blankets; advised patient to remove at least 1. otherwise, no s/s of infection or distress; 
stable at this time.



Latest Abrams Fall Score: 80  

Fall Risk: High Risk 

Safety Measures: Call light Within Reach, Bed Alarm Zone 2, Side Rails Side Rails x2, Bed 
position Low and Locked.

Fall Precautions: 

Yellow Gown

Patient Fall Education



Report given to [].

## 2021-01-24 NOTE — INTERNAL MED PROGRESS NOTE
Subjective


Date of Service:  Jan 24, 2021


Physician Name


Ludwin Fulton


Attending Physician


Medhat Vogt MD





Current Medications








 Medications


  (Trade)  Dose


 Ordered  Sig/Tapan


 Route


 PRN Reason  Start Time


 Stop Time Status Last Admin


Dose Admin


 


 Acetaminophen


  (Tylenol)  650 mg  Q6H  PRN


 ORAL


 Temp >100.5  1/22/21 07:15


 2/21/21 07:14  1/24/21 08:28





 


 Acetaminophen


  (Tylenol)  650 mg  Q6H  PRN


 ORAL


 Mild Pain (Pain Scale 1-3)  1/22/21 07:15


 2/21/21 07:14   





 


 Cetirizine HCl


  (ZyrTEC)  10 mg  QHS


 ORAL


   1/22/21 04:00


 4/22/21 03:59  1/23/21 20:05





 


 Dextrose/Sodium


 Chloride  1,000 ml @ 


 100 mls/hr  Q10H


 IV


   1/22/21 04:00


 2/21/21 03:59  1/23/21 10:23





 


 Docusate Sodium


  (Colace)  100 mg  THREE TIMES A  DAY


 ORAL


   1/22/21 18:00


 2/21/21 17:59  1/24/21 09:00





 


 Gabapentin


  (Neurontin)  600 mg  BID@0800,1300


 ORAL


   1/22/21 09:15


 2/21/21 08:59  1/24/21 08:27





 


 Gabapentin


  (Neurontin)  1,200 mg  QHS


 ORAL


   1/22/21 21:00


 2/21/21 03:59  1/23/21 20:04





 


 Heparin Sodium


  (Porcine)


  (Heparin 5000


 units/ml)  5,000 units  EVERY 8  HOURS


 SUBQ


   1/22/21 06:00


 3/8/21 05:59  1/24/21 05:37





 


 Hydromorphone HCl


  (Dilaudid)  0.5 mg  Q4H  PRN


 SUBQ


 Mild Pain (Pain Scale 1-3)  1/22/21 12:30


 1/29/21 12:29  1/22/21 17:22





 


 Morphine Sulfate


  (Morphine


 Sulfate)  2 mg  Q4H  PRN


 IVP


 For moderate pain (4-6)  1/22/21 18:00


 1/29/21 17:59  1/24/21 11:21





 


 Morphine Sulfate


  (Morphine


 Sulfate)  4 mg  Q3H  PRN


 IVP


 For severe pain (7-10)  1/23/21 16:00


 1/30/21 15:59  1/24/21 08:28





 


 Ondansetron HCl


  (Zofran)  4 mg  Q6H  PRN


 IVP


 Nausea & Vomiting  1/22/21 12:30


 2/21/21 12:29  1/23/21 17:25





 


 Oxycodone HCl


  (OxyCONTIN)  20 mg  EVERY 12  HOURS


 ORAL


   1/22/21 21:00


 1/29/21 20:59  1/24/21 09:00





 


 Oxycodone HCl


  (Roxicodone)  5 mg  Q6H  PRN


 ORAL


 Breakthrough Pain  1/23/21 09:00


 1/30/21 08:59  1/24/21 04:18





 


 Zolpidem Tartrate


  (Ambien)  5 mg  HSPRN  PRN


 ORAL


 Insomnia  1/23/21 16:00


 1/29/21 03:29   











Allergies:  


Coded Allergies:  


     LAMOTRIGINE (Verified  Allergy, Mild, Hives, 10/5/18)


     PENICILLINS (Verified  Allergy, Mild, Hives, 10/5/18)


     ERYTHROMYCIN BASE (Unverified  Allergy, Unknown, Rash, 10/5/18)


     PREGABALIN (Verified  Allergy, Unknown, 10/5/18)


   SWELLING


     SERTRALINE (Verified  Allergy, Unknown, 10/5/18)


     TRAMADOL HCL (Verified  Allergy, Unknown, 2/25/15)


   VOMIT


ROS Limited/Unobtainable:  No


Constitutional:  Reports: fever


HEENT:  Reports: no symptoms


Cardiovascular:  Reports: no symptoms


Respiratory:  Reports: no symptoms


Gastrointestinal/Abdominal:  Reports: no symptoms


Genitourinary:  Reports: no symptoms


Neurologic/Psychiatric:  Reports: no symptoms


Subjective


61 YO F admitted with right ankle pain.  Now fracture posterior and lateral rig

ht malleolus.  S/P ORIF 1/21/21.  Cover for Int Sergio-Dr Vogt





Objective





Last Vital Signs








  Date Time  Temp Pulse Resp B/P (MAP) Pulse Ox O2 Delivery O2 Flow Rate FiO2


 


1/24/21 12:00 98.5 84 18 126/62 (83) 95   


 


1/24/21 09:00      Room Air  


 


1/22/21 16:20       3 











Laboratory Tests








Test


 1/24/21


08:50


 


White Blood Count


 15.0 K/UL


(4.8-10.8)  #H


 


Red Blood Count


 3.19 M/UL


(4.20-5.40)  L


 


Hemoglobin


 8.8 G/DL


(12.0-16.0)  L


 


Hematocrit


 28.5 %


(37.0-47.0)  L


 


Mean Corpuscular Volume 89 FL (80-99)  


 


Mean Corpuscular Hemoglobin


 27.4 PG


(27.0-31.0)


 


Mean Corpuscular Hemoglobin


Concent 30.8 G/DL


(32.0-36.0)  L


 


Red Cell Distribution Width


 17.8 %


(11.6-14.8)  H


 


Platelet Count


 290 K/UL


(150-450)


 


Mean Platelet Volume


 7.3 FL


(6.5-10.1)


 


Neutrophils (%) (Auto)


 80.5 %


(45.0-75.0)  H


 


Lymphocytes (%) (Auto)


 12.3 %


(20.0-45.0)  L


 


Monocytes (%) (Auto)


 5.1 %


(1.0-10.0)


 


Eosinophils (%) (Auto)


 1.4 %


(0.0-3.0)


 


Basophils (%) (Auto)


 0.8 %


(0.0-2.0)


 


Sodium Level


 136 MMOL/L


(136-145)


 


Potassium Level


 4.3 MMOL/L


(3.5-5.1)


 


Chloride Level


 103 MMOL/L


()


 


Carbon Dioxide Level


 27 MMOL/L


(21-32)


 


Anion Gap


 7 mmol/L


(5-15)


 


Blood Urea Nitrogen


 15 mg/dL


(7-18)


 


Creatinine


 1.0 MG/DL


(0.55-1.30)


 


Estimat Glomerular Filtration


Rate > 60 mL/min


(>60)


 


Glucose Level


 105 MG/DL


()


 


Calcium Level


 8.7 MG/DL


(8.5-10.1)











Microbiology








 Date/Time


Source Procedure


Growth Status





 


 1/21/21 20:47


Nasopharynx SARS-CoV-2 RdRp Gene Assay - Final Complete

















Intake and Output  


 


 1/23/21 1/24/21





 19:00 07:00


 


Intake Total 1900 ml 500 ml


 


Output Total 300 ml 


 


Balance 1600 ml 500 ml


 


  


 


Intake Oral 1000 ml 500 ml


 


IV Total 900 ml 


 


Output Urine Total 300 ml 


 


# Voids 1 3








Objective


General Appearance:  WD/WN, no apparent distress, alert, obese


EENT:  PERRL/EOMI, normal ENT inspection


Neck:  non-tender, normal alignment, supple, normal inspection


Cardiovascular:  normal peripheral pulses, normal rate, regular rhythm, no 

gallop/murmur, no JVD


Respiratory/Chest:  chest wall non-tender, lungs clear, normal breath sounds, no

respiratory distress, no accessory muscle use


Abdomen:  normal bowel sounds, non tender, soft, no organomegaly, no mass


Extremities:  normal range of motion, non-tender


Neurologic:  CNs II-XII grossly normal, no motor/sensory deficits


Skin:  normal pigmentation





Assessment/Plan


Problem List:  


(1) Hypertension


Assessment & Plan:  Continue valsartan





(2) Bipolar depression


(3) Maisonneuve fracture of right lower extremity


Assessment & Plan:  S/P ORIF right posterior and lateral malleolus fracture on 

1/21/21.  Ortho=Dr Donn Fletcher





(4) Degenerative disc disease, lumbar


(5) Bipolar 1 disorder, depressed


Assessment & Plan:  Continue cymbalta and trazodone





(6) Acute coronary syndrome


(7) Methamphetamine abuse


(8) Chest pain of uncertain etiology


(9) Fever


Assessment & Plan:  Post op.  Check urine cult and CXR.  Start ceftriaxone





Assessment/Plan


Discharge planning











Ludwin Fulton MD               Jan 24, 2021 13:15

## 2021-01-24 NOTE — SURGERY PROGRESS NOTE
Surgery Progress Note


Subjective


Symptoms:  improved, tolerating diet, voiding well, passing flatus, pain 

decreased


Additional Comments


working with pt





Objective





Last 24 Hour Vital Signs








  Date Time  Temp Pulse Resp B/P (MAP) Pulse Ox O2 Delivery O2 Flow Rate FiO2


 


1/24/21 08:58 101.7       


 


1/24/21 08:58 101.7       


 


1/24/21 08:58 98.5       


 


1/24/21 08:00 100.0 93 18 120/49 (72) 95   


 


1/24/21 05:37 101.7 95 18 106/47 (66) 93   


 


1/24/21 02:05 97.8 75 18 150/61 (90) 93   


 


1/23/21 21:00      Room Air  


 


1/23/21 20:00 98.6 69 18 120/71 (87) 96   


 


1/23/21 16:00 97.2 82 18 116/50 (72) 97   


 


1/23/21 12:00 98.7 65 20 125/48 (73) 98   








I&O











Intake and Output  


 


 1/23/21 1/24/21





 19:00 07:00


 


Intake Total 1900 ml 500 ml


 


Output Total 300 ml 


 


Balance 1600 ml 500 ml


 


  


 


Intake Oral 1000 ml 500 ml


 


IV Total 900 ml 


 


Output Urine Total 300 ml 


 


# Voids 1 3








Dressing:  dry


Wound:  clean


Cardiovascular:  RSR


Respiratory:  clear


Abdomen:  soft, flat, non-tender, non-distended


Extremities:  no edema, no tenderness, no cyanosis





Laboratory Tests








Test


 1/24/21


08:50


 


White Blood Count


 15.0 K/UL


(4.8-10.8)  #H


 


Red Blood Count


 3.19 M/UL


(4.20-5.40)  L


 


Hemoglobin


 8.8 G/DL


(12.0-16.0)  L


 


Hematocrit


 28.5 %


(37.0-47.0)  L


 


Mean Corpuscular Volume 89 FL (80-99)  


 


Mean Corpuscular Hemoglobin


 27.4 PG


(27.0-31.0)


 


Mean Corpuscular Hemoglobin


Concent 30.8 G/DL


(32.0-36.0)  L


 


Red Cell Distribution Width


 17.8 %


(11.6-14.8)  H


 


Platelet Count


 290 K/UL


(150-450)


 


Mean Platelet Volume


 7.3 FL


(6.5-10.1)


 


Neutrophils (%) (Auto)


 80.5 %


(45.0-75.0)  H


 


Lymphocytes (%) (Auto)


 12.3 %


(20.0-45.0)  L


 


Monocytes (%) (Auto)


 5.1 %


(1.0-10.0)


 


Eosinophils (%) (Auto)


 1.4 %


(0.0-3.0)


 


Basophils (%) (Auto)


 0.8 %


(0.0-2.0)


 


Sodium Level


 136 MMOL/L


(136-145)


 


Potassium Level


 4.3 MMOL/L


(3.5-5.1)


 


Chloride Level


 103 MMOL/L


()


 


Carbon Dioxide Level


 27 MMOL/L


(21-32)


 


Anion Gap


 7 mmol/L


(5-15)


 


Blood Urea Nitrogen


 15 mg/dL


(7-18)


 


Creatinine


 1.0 MG/DL


(0.55-1.30)


 


Estimat Glomerular Filtration


Rate > 60 mL/min


(>60)


 


Glucose Level


 105 MG/DL


()


 


Calcium Level


 8.7 MG/DL


(8.5-10.1)











Plan


Problems:  


(1) Hypertension


(2) Bipolar depression


(3) DJD (degenerative joint disease) of knee


(4) ACS (acute coronary syndrome)


(5) Left knee pain


(6) Left ankle pain


(7) Maisonneuve fracture of right lower extremity


Assessment & Plan:  fall


ankle fx


Post reduction images obtained with ankle in splint. On the lateral view, 

cortical


step-off noted involving the posterior malleolus as well as the distal fibul

a/lateral


malleolus noted. These fractures are not well seen on the frontal projection. 

Ankle


mortise grossly anatomic as well as subtalar joint. There is soft tissue 

swelling.


 


IMPRESSION:


 


FRACTURES OF THE POSTERIOR AND LATERAL MALLEOLI ONLY APPRECIATED ON THE LATERAL 

VIEW.





ortho eval


to OR for ORIF 


no further trauma isolated ortho


no head injury


no loc 





doing well post op 


no n/v


pain okay


pt/ot 


splint okay


working with pt


d/c planning


needs assistance 


care facility 





(8) Fibromyalgia


(9) HTN (hypertension)


(10) Degenerative disc disease, lumbar


(11) Bipolar 1 disorder, depressed


(12) Acute coronary syndrome


(13) Methamphetamine abuse


(14) Right knee pain


(15) Osteoarthritis of left knee


(16) Obesity (BMI 35.0-39.9 without comorbidity)


(17) Intractable neuropathic pain of left knee


(18) Chest pain of uncertain etiology











Yon Kearns                Jan 24, 2021 10:50

## 2021-01-24 NOTE — NUR
Discharge Planning:



Disposition: SNF 

Facility: [facility]





Discharge Plan and IMM Form discussed with: patient and attending Dr. Fulton





Contact Information: no family to notify, patient will notify friends.



per previous noted called [facility] s/w Mikayla, she stated that she is unable to 
accepted patient since as right now they are only taking Covid-19  positive patients. with 
pt's permission called and faxed info to multiple facilities. 



1. [facility] [phone] s/w Sayra is reviewing info, they will call back once financially 
cleared. 



2. [facility] s/w Ariella she accepted patient to transfer today/tomorrow. 
assigned room number: 4"B" assigned MD: Dr. Schneider. patient is aware and agreed to d/c 
plan. patient will notify friends. 

Dr. Fulton is aware and agreed to d/c plan. 



[facility]

address: [address] [place] [address] 

Phone:   [phone] (report)

Assigned room number: 4"B"

Assigned MD: Dr. Schneider



Transportation arranged as will call: [phone] 

- please call to activate transportation 



- Please contact Dr. Fulton to obtain orders in the morning

## 2021-01-25 VITALS — DIASTOLIC BLOOD PRESSURE: 82 MMHG | SYSTOLIC BLOOD PRESSURE: 112 MMHG

## 2021-01-25 VITALS — DIASTOLIC BLOOD PRESSURE: 67 MMHG | SYSTOLIC BLOOD PRESSURE: 142 MMHG

## 2021-01-25 VITALS — SYSTOLIC BLOOD PRESSURE: 150 MMHG | DIASTOLIC BLOOD PRESSURE: 90 MMHG

## 2021-01-25 VITALS — DIASTOLIC BLOOD PRESSURE: 64 MMHG | SYSTOLIC BLOOD PRESSURE: 135 MMHG

## 2021-01-25 VITALS — SYSTOLIC BLOOD PRESSURE: 133 MMHG | DIASTOLIC BLOOD PRESSURE: 64 MMHG

## 2021-01-25 LAB
ADD MANUAL DIFF: NO
ANION GAP SERPL CALC-SCNC: 8 MMOL/L (ref 5–15)
BASOPHILS NFR BLD AUTO: 0.4 % (ref 0–2)
BUN SERPL-MCNC: 12 MG/DL (ref 7–18)
CALCIUM SERPL-MCNC: 8.5 MG/DL (ref 8.5–10.1)
CHLORIDE SERPL-SCNC: 102 MMOL/L (ref 98–107)
CO2 SERPL-SCNC: 27 MMOL/L (ref 21–32)
CREAT SERPL-MCNC: 1 MG/DL (ref 0.55–1.3)
EOSINOPHIL NFR BLD AUTO: 2.7 % (ref 0–3)
ERYTHROCYTE [DISTWIDTH] IN BLOOD BY AUTOMATED COUNT: 17.8 % (ref 11.6–14.8)
HCT VFR BLD CALC: 26.1 % (ref 37–47)
HGB BLD-MCNC: 8 G/DL (ref 12–16)
LYMPHOCYTES NFR BLD AUTO: 13.2 % (ref 20–45)
MCV RBC AUTO: 89 FL (ref 80–99)
MONOCYTES NFR BLD AUTO: 5.6 % (ref 1–10)
NEUTROPHILS NFR BLD AUTO: 78.1 % (ref 45–75)
PLATELET # BLD: 244 K/UL (ref 150–450)
POTASSIUM SERPL-SCNC: 4.2 MMOL/L (ref 3.5–5.1)
RBC # BLD AUTO: 2.92 M/UL (ref 4.2–5.4)
SODIUM SERPL-SCNC: 137 MMOL/L (ref 136–145)
WBC # BLD AUTO: 10.3 K/UL (ref 4.8–10.8)

## 2021-01-25 RX ADMIN — MORPHINE SULFATE PRN MG: 4 INJECTION INTRAVENOUS at 12:15

## 2021-01-25 RX ADMIN — MORPHINE SULFATE PRN MG: 4 INJECTION INTRAVENOUS at 00:15

## 2021-01-25 RX ADMIN — HUMAN INSULIN SCH MLS/HR: 100 INJECTION, SOLUTION SUBCUTANEOUS at 02:55

## 2021-01-25 RX ADMIN — OXYCODONE HYDROCHLORIDE SCH MG: 20 TABLET, FILM COATED, EXTENDED RELEASE ORAL at 10:24

## 2021-01-25 RX ADMIN — DOCUSATE SODIUM SCH MG: 100 CAPSULE, LIQUID FILLED ORAL at 12:22

## 2021-01-25 RX ADMIN — MORPHINE SULFATE PRN MG: 4 INJECTION INTRAVENOUS at 04:03

## 2021-01-25 RX ADMIN — SODIUM CHLORIDE SCH MLS/HR: 0.9 INJECTION INTRAVENOUS at 13:46

## 2021-01-25 RX ADMIN — DOCUSATE SODIUM SCH MG: 100 CAPSULE, LIQUID FILLED ORAL at 10:24

## 2021-01-25 RX ADMIN — MORPHINE SULFATE PRN MG: 4 INJECTION INTRAVENOUS at 08:11

## 2021-01-25 RX ADMIN — HEPARIN SODIUM SCH UNITS: 5000 INJECTION INTRAVENOUS; SUBCUTANEOUS at 06:51

## 2021-01-25 RX ADMIN — HEPARIN SODIUM SCH UNITS: 5000 INJECTION INTRAVENOUS; SUBCUTANEOUS at 13:47

## 2021-01-25 NOTE — NUR
NURSE NOTES:



BP was 150/90 and Pt c/o that pt did not take blood pressure medication. Called Dr. Vogt 
and MD ordered continue blood pressure home meds which is valsartan 160 mg BID PO. Order 
read back and will put it in.

## 2021-01-25 NOTE — NUR
NURSE NOTES:



Discharge instruction was given. ID/IV removed. Pt discharged with our hospital wider walker 
in stable condition.

## 2021-01-25 NOTE — NUR
NURSE NOTES:



Received report from THALIA Serrano. Rounding done with outgoing nurse. Pt a/o x 4, in bed, 
having breakfast. No SOB noted. Denies any pain at this time. D5NS is running @100ml/hr via 
Lt wrist. Rt ankle surgical dressing is dry/intact. Bed in lowest position, call light 
within reach. Will continue to monitor.

## 2021-01-25 NOTE — DIAGNOSTIC IMAGING REPORT
EXAM:

  XR Chest, 1 View

 

CLINICAL HISTORY:

  POST-OP

 

TECHNIQUE:

  Frontal view of the chest.

 

COMPARISON:

  Chest x-ray 10/14/19

 

FINDINGS:

  Lungs:  Mild vascular and interstitial prominence.  Mild airspace 

opacities in the right lower lobe.

  Pleural space:  Unremarkable.  No pneumothorax.

  Heart:  Mild cardiomegaly.  

  Mediastinum:  Unremarkable.

  Bones/joints:  Unremarkable.

 

IMPRESSION:     

  Mild cardiomegaly.  Mild vascular and interstitial prominence.  Mild 

airspace opacity in the right lower lobe.  Query edema or pneumonitis.

## 2021-01-25 NOTE — NUR
NURSE HAND-OFF: 



Important Events on Shift: Pain management, Void x6, possible DC to home

Patient Status: calm

Diet: regular



Pending Orders: 

Pending Results/Labs: urine culture

Pending MD notification:



Latest Vital Signs: Temperature 99.6 , Pulse 89 , B/P 142 /67 , Respiratory Rate 18 , O2 SAT 
94 , Nasal Cannula, O2 Flow Rate 3 .  

Vital Sign Comment: VSS



Latest Abrams Fall Score: 80  

Fall Risk: High Risk 

Safety Measures: Call light Within Reach, Bed Alarm Zone 2, Side Rails Side Rails x2, Bed 
position Low and Locked.

Fall Precautions: 

Yellow Socks

Yellow Gown

Door Sign



Report given to THALIA Ann.

## 2021-01-25 NOTE — NUR
CASE MANAGEMENT:REVIEW



1/25/21

SI: POD #3...S/P ORIF RT ANKLE

100.5   89  18  142/67  94% ON RA

H/H-8.0/26.1   





IS: IV ROCEPHIN Q24

IVF@100/HR

OXYCONTIN PO Q12

NEURONTIN PO QHS

HEPARIN SQ Q8HRS

ZYRTEC PO QHS

IV MORPHINE Q3HRS PRN

**: MED/SURG STATUS 3 EAST

DCP: FROM HOME...REFERRED TO SNF

## 2021-01-25 NOTE — NUR
***DISCHARGE PLANNING

PATIENT HAS BEEN REFERRED TO [facility] AS PER MD'S ORDER

DISCHARGE PLANNER WILL F/U WITH BED AVAILABILITY TODAY

## 2021-01-25 NOTE — NUR
NURSE NOTES:



Central supply only carry regular walker and it doesn't fit for the patient because patient 
is obesity. Patient spoke to Caryl  regarding that pt will take our hospital 
walker today and will drop off our hospital walker when wider walker is delivered to pt's 
house tomorrow.

## 2021-01-25 NOTE — NUR
NURSE NOTES:



Patient refused going to SNF and Dr. Vogt ordered d/c home with FWW and continue home 
medication. Noted and carried out.

## 2021-01-29 NOTE — DISCHARGE SUMMARY
Discharge Summary


Discharge Summary


_


Date of admission: 1/21/2021





Date of discharge: 1/25/2021





Discharged by Dr. Vogt





History of Present Illness and Brief Hospital Course


Ms. Asif is a 60-year-old female with history of hypertension, who presented to

the ED for evaluation of ankle pain.  She was status post fall and reported 

twisting her ankle the morning of presentation.  She was seen at [facility] for 

fracture and she was placed in a sugar tong splint.  She was told that she may 

need surgery but the orthopedic surgeon told her to call [facility] 

to be seen.  





Her right knee x-ray revealed proximal fibula fracture. Right ankle x-ray showed

posterior avulsion fracture of the tibia with widening of the mortise and distal

tibial fracture.





She was admitted to the hospital for open reduction and internal fixation of her

right ankle. Patient tolerated the procedure well and was taken to recovery room

with stable vital signs.





Patient was closely observed during the recovery.  Patient was evaluated by 

physical therapist as well.  Patient refused going to SNF and therefore was 

discharged home with a walker.  She was instructed to continue home medication.





Consultants:


Surgery Dr. Kearns


Orthopedic surgery Dr. Fletcher





Discharge Condition


Improved and stable





Discharge Activity


Advance as tolerated, with a walker





Final diagnoses


Right ankle fracture dislocation with fracture of lateral malleolus with 

ligamentous deltoid tear


Right syndesmosis tear


BMI over 40


Status post ORIF of right ankle fracture dislocation


Status post open repair of right syndesmosis ligament





I have been assigned to dictate discharge summary for this account.


I was not involved in the patient's management











Jonah Esquivel                 Jan 29, 2021 16:51

## 2024-03-06 NOTE — EMERGENCY ROOM REPORT
History of Present Illness


General


Chief Complaint:  Pain


Source:  Patient, Medical Record





Present Illness


HPI


 57-year-old female presents to the emergency department complaining of 10 out 

of 10 in severity localized pain to the posterior neck as well as as the pedal 

region of her head status post fall.  Patient describes she was sitting on a 

chair at a table when one of the legs broke and she fell backwards and hit her 

head on either the ground or a refrigerator.  Patient denies loss of 

consciousness.  Patient denies taking blood thinning medications.  Patient 

denies nausea or vomiting she reports some intermittent dizziness she denies 

visual changes, loss of vision or seeing floaters.  Patient denies midline back 

pain, saddle anesthesia, urinary retention or urinary incontinence.  Patient is 

also complaining of left shoulder pain which is exacerbated upon attempts to 

raise her left arm.  She has a history of chronic pain and multiple surgeries.


Allergies:  


Coded Allergies:  


     LAMOTRIGINE (Verified  Allergy, Mild, Hives, 10/5/18)


     PENICILLINS (Verified  Allergy, Mild, Hives, 10/5/18)


     CODEINE (Verified  Allergy, Unknown, 7/26/18)


 ITCHING


     ERYTHROMYCIN BASE (Unverified  Allergy, Unknown, Rash, 10/5/18)


     PREGABALIN (Verified  Allergy, Unknown, 10/5/18)


 SWELLING


     SERTRALINE (Verified  Allergy, Unknown, 10/5/18)


     TRAMADOL HCL (Verified  Allergy, Unknown, 2/25/15)


 VOMIT


     ASPIRIN (Verified  Adverse Reaction, Severe, 7/26/18)


 NAUSEA AND VOMITING


     GABAPENTIN (Verified  Adverse Reaction, Intermediate, 7/28/18)





Patient History


Past Medical History:  see triage record


Past Surgical History:  none


Pertinent Family History:  none


Pregnant Now:  No


Reviewed Nursing Documentation:  PMH: Agreed; PSxH: Agreed





Nursing Documentation-PMH


Past Medical History:  No History, Except For


Hx Cardiac Problems:  Yes - osteoarthoritis


Hx Hypertension:  Yes


Hx Cancer:  No


Hx Gastrointestinal Problems:  Yes


Hx Neurological Problems:  Yes - Anxiety, depression


Hx Transient Ischemic Attacks:  Yes


Hx Syncope:  Yes





Review of Systems


All Other Systems:  negative except mentioned in HPI





Physical Exam





Vital Signs








  Date Time  Temp Pulse Resp B/P (MAP) Pulse Ox O2 Delivery O2 Flow Rate FiO2


 


10/5/18 15:19 98.4 84 16 111/78 96 Room Air  





 98.4       








Sp02 EP Interpretation:  reviewed, normal


General Appearance:  no apparent distress, alert, GCS 15, non-toxic, obese


Head:  normocephalic, other - TTP to occipetal region, no palpable hematoma/

swelling, no open wounds/bleeding


Eyes:  bilateral eye normal inspection, bilateral eye PERRL, bilateral eye EOMI

, bilateral eye other - no photophobia


ENT:  hearing grossly normal, normal voice


Neck:  full range of motion - pt. is noted to have FROM without grimmacing 

prior to cervical collar placement, pt. able to look side to side while 

searching through purse to produce pictures of broken chair on cellphone. , 

tender midline - Pt. placed into hard Cervical collar


Respiratory:  lungs clear, normal breath sounds, speaking full sentences


Cardiovascular #1:  regular rate, rhythm


Gastrointestinal:  non tender


Musculoskeletal:  back normal, gait/station normal - with assistance by cane, 

normal range of motion, tender - TTP left posterior shoulder, pain with raising 

arm up to 90* point- noted during pronator drift test.


Neurologic:  alert, oriented x3, responsive, motor strength/tone normal, 

sensory intact, normal gait - with cane, speech normal, grossly normal


Psychiatric:  judgement/insight normal


Skin:  normal color, no rash, warm/dry, well hydrated, other - no bruises, 

abrasions, erythema or open wounds.





Medical Decision Making


PA Attestation


Dr. phan is my supervising Physician whom patient management has been 

discussed with.


Diagnostic Impression:  


 Primary Impression:  


 Contusion of head


 Qualified Codes:  S00.93XA - Contusion of unspecified part of head, initial 

encounter


 Additional Impressions:  


 Neck pain


 Fall involving chair as cause of accidental injury in home as place of 

occurrence


 Qualified Codes:  W07.XXXA - Fall from chair, initial encounter; Y92.009 - 

Unspecified place in unspecified non-institutional (private) residence as the 

place of occurrence of the external cause


 Contusion of shoulder, left


 Qualified Codes:  S40.012A - Contusion of left shoulder, initial encounter


ER Course


 57-year-old female presents to the emergency department complaining of 10 out 

of 10 in severity localized pain to the posterior neck as well as as the pedal 

region of her head status post fall.  Patient describes she was sitting on a 

chair at a table when one of the legs broke and she fell backwards and hit her 

head on either the ground or a refrigerator.  Patient denies loss of 

consciousness.  Patient denies taking blood thinning medications.  Patient 

denies nausea or vomiting she reports some intermittent dizziness she denies 

visual changes, loss of vision or seeing floaters.  Patient denies midline back 

pain, saddle anesthesia, urinary retention or urinary incontinence.  Patient is 

also complaining of left shoulder pain which is exacerbated upon attempts to 

raise her left arm.  She has a history of chronic pain and multiple surgeries. 








Ddx considered but are not limited to Fracture, dislocation, contusion,  Sprain/

Strain/Spasm,  Cervical Fracture or dislocation, spinal chord injury, ICH, 

subdural hematoma, concussion just to name a few. 





Vital signs: are WNL, pt. is afebrile


H&PE are most consistent with musculoskeletal injury will perform imaging to r/

o fractures/dislocations. 





ORDERS:


-CT Head No Contrast + C-Spine No Contrast





-  X-ray Left Shoulder   - negative for fx, Dislocation, or significant soft 

tissue injury, per preliminary read in ED, and signed by  BRITTANY Cueva, my 

supervising physician has reviewed, and agrees with my interpretation.





ED INTERVENTIONS:





-  Upon initial examination patient is placed in hard cervical collar pending 

imaging results.  --Pt. cleared from C-Spine one results showed no acute fx or 

malalignments.  no evidence of instability upon removal of C-collar.


- Norco by mouth- Pt. declined


-Robaxin PO


-Lidoderm TP








DISCHARGE: At this time pt. is stable for d/c to home. Will provide printed 

patient care instructions, and any necessary prescriptions. Care plan and 

follow up instructions have been discussed with the patient prior to discharge.


Other X-Ray Diagnostic Results


Other X-Ray Diagnostic Results :  


   X-Ray ordered:  Left Shoulder


   # of Views/Limited Vs Complete:  3 View


   Indication:  Pain


   EP Interpretation:  Yes


   PA Xray:  Interpretation reviewed, by supervising MD, and agrees with 

findings.


   Interpretation:  no dislocation, no soft tissue swelling, no fractures


   Impression:  No acute disease


   Electronically Signed by:  Karine Cueva PA-C





CT/MRI/US Diagnostic Results


CT/MRI/US Diagnostic Results #1:  


   Imaging Test Ordered:  CT HEAD NON CONTRAST


   Impression


No evidence of acute fracture, hemorrhage, or intracranial process Per official 

radiology report- Please see report for specific details.


CT/MRI/US Diagnostic Results #2:  


   Imaging Test Ordered:  CT C-spine NO CONTRAST


   Impression


DJD, no acute fractures or malalignments Per official radiology report- Please 

see report for specific details.





Last Vital Signs








  Date Time  Temp Pulse Resp B/P (MAP) Pulse Ox O2 Delivery O2 Flow Rate FiO2


 


10/5/18 15:39 98.4 84 16 111/78 96 Room Air  





 98.4       








Status:  improved


Disposition:  HOME, SELF-CARE


Condition:  Stable


Scripts


Lidocaine (Lidoderm) 1 Each Adh..patch


1 PATCH TOPIC DAILY, #30 PATCH 0 Refills


   Patch(es) may remain in place for up to 12 hours in any 24-hour


   period.


   Prov: Karine Cueva         10/5/18 


Methocarbamol* (ROBAXIN*) 500 Mg Tablet


500 MG PO TID, #24 TAB 0 Refills


   Take 1000mg TID for 3 days then reduce to 500mg TID for remaining 2


   days.


   Prov: Karine Cueva         10/5/18


Referrals:  


Ludwin Fulton MD (PCP)


Patient Instructions:  Contusion, Easy-to-Read, Head Injury, Adult, Easy-to-Read





Additional Instructions:  


Take medications as directed. 





 ** Follow up with a Primary Care Provider in 3-5 days, even if your symptoms 

have resolved. ** 


--Please review list of primary care clinics, if you do not already have a 

primary care provider





Return sooner to ED if new symptoms occur, or current symptoms become worse. 











- Please note that this Emergency Department Report was dictated using [employer] technology software, occasionally this can lead to 

erroneous entry secondary to interpretation by the dictation equipment.











Karine Cueva Oct 5, 2018 16:19 Pulmonary Progress Note    Date of Admission: 2/29/2024   LOS: 6 days       CC:  Chief Complaint   Patient presents with    Shortness of Breath     Pt reports hx of CHF and COPD. Pt reports on diuretics and has been taking as prescribed. Pt reports BLE swelling and SOB. Pt is 77% on RA. Pt reports on 2L O2 at home as needed. Pt placed on 4l n/c in triage and increased to 89%        Subjective:  Back up to 9 L.  Wanting to be discharge either today or tomorrow.  ROS:   No nausea  No Vomiting  No chest pain       Assessment:     COPD  Chronic hypoxemia  Sleep apnea  Tobacco abuse  Obesity    Plan:     This note may have been transcribed using Dragon Dictation software. Please disregard any translational errors.       Hospital Day: 6     COPD, severe   Duoneb's   Dulera    prednisone     Acute on chronic hypoxemic resp failure with acute hypercapnia   Wean O2 to sat >90%  Back up to 9 L nasal cannula.  Baseline 2 L.        Influenza   Tamiflu  Worsening chest X-ray.    Volume status - 7 L  Monitor contraction alkalosis.  Procal 0.14  3/2/24   .         MELISSA  Resolving    Sinus dryness   Loving spray every 8 hours, controlled    Obesity       Data:        PHYSICAL EXAM:   Blood pressure (!) 101/53, pulse 57, temperature 97.5 °F (36.4 °C), temperature source Oral, resp. rate 16, height 1.651 m (5' 5\"), weight 124 kg (273 lb 5.9 oz), SpO2 (!) 86 %.'  Body mass index is 45.49 kg/m².   Gen: No distress.    ENT:   Resp: No accessory muscle use. No crackles. No wheezes. No rhonchi.    CV: Regular rate. Regular rhythm. No murmur or rub. No edema.   Skin: Warm, dry, normal texture and turgor. No nodule on exposed extremities.   M/S: No cyanosis. No clubbing. No joint deformity.  Psych:       Medications:    Scheduled Meds:   furosemide  40 mg IntraVENous Once    predniSONE  40 mg Oral Daily    glipiZIDE  5 mg Oral QAM AC    sodium zirconium cyclosilicate  10 g Oral Daily    carvedilol  12.5 mg Oral BID WC    ipratropium 0.5    GLUCOSEU >=1000*       No results for input(s): \"PH\", \"PCO2\", \"PO2\" in the last 72 hours.    Cx:      Films:             This note was transcribed using Dragon Dictation software. Please disregard any translational errors.      [facility]  [phone]

## 2025-03-17 NOTE — HISTORY & PHYSICAL
Consult request review: Abnormal WBC  3/12/2025 WBC 16.7, hgb 13.1, plt 289k. ANC 13.7   2/14/2024 WBC 9.5, Hgb 13.6, Plt 262k. Normal diff.  PSR 6-8 weeks    
History and Physical


History & Physicial


Job# [id number]











Medhat Vogt MD                 Jan 22, 2021 17:23
05-Dec-2022